# Patient Record
Sex: FEMALE | Race: WHITE | Employment: OTHER | URBAN - METROPOLITAN AREA
[De-identification: names, ages, dates, MRNs, and addresses within clinical notes are randomized per-mention and may not be internally consistent; named-entity substitution may affect disease eponyms.]

---

## 2017-02-07 ENCOUNTER — OFFICE VISIT (OUTPATIENT)
Dept: INTERNAL MEDICINE CLINIC | Age: 82
End: 2017-02-07

## 2017-02-07 VITALS
HEART RATE: 87 BPM | RESPIRATION RATE: 12 BRPM | TEMPERATURE: 97.9 F | DIASTOLIC BLOOD PRESSURE: 76 MMHG | SYSTOLIC BLOOD PRESSURE: 125 MMHG | BODY MASS INDEX: 23.37 KG/M2 | HEIGHT: 61 IN | WEIGHT: 123.8 LBS | OXYGEN SATURATION: 97 %

## 2017-02-07 DIAGNOSIS — Z13.39 SCREENING FOR ALCOHOLISM: ICD-10-CM

## 2017-02-07 DIAGNOSIS — K29.70 GASTRITIS, PRESENCE OF BLEEDING UNSPECIFIED, UNSPECIFIED CHRONICITY, UNSPECIFIED GASTRITIS TYPE: Primary | ICD-10-CM

## 2017-02-07 DIAGNOSIS — M85.80 OSTEOPENIA: ICD-10-CM

## 2017-02-07 DIAGNOSIS — Z12.31 ENCOUNTER FOR SCREENING MAMMOGRAM FOR MALIGNANT NEOPLASM OF BREAST: ICD-10-CM

## 2017-02-07 DIAGNOSIS — F32.89 OTHER DEPRESSION: ICD-10-CM

## 2017-02-07 DIAGNOSIS — E78.00 HYPERCHOLESTEROLEMIA: ICD-10-CM

## 2017-02-07 DIAGNOSIS — Z00.00 ROUTINE GENERAL MEDICAL EXAMINATION AT A HEALTH CARE FACILITY: ICD-10-CM

## 2017-02-07 PROBLEM — E61.1 IRON DEFICIENCY: Status: ACTIVE | Noted: 2017-02-07

## 2017-02-07 RX ORDER — MOXIFLOXACIN HCL 0.5 %
1 DROPS OPHTHALMIC (EYE) DAILY
COMMUNITY
Start: 2017-02-06 | End: 2018-01-11 | Stop reason: ALTCHOICE

## 2017-02-07 RX ORDER — FUROSEMIDE 40 MG/1
40 TABLET ORAL
COMMUNITY
End: 2017-02-07 | Stop reason: SDUPTHER

## 2017-02-07 RX ORDER — PREDNISOLONE ACETATE 10 MG/ML
1 SUSPENSION/ DROPS OPHTHALMIC 2 TIMES DAILY
COMMUNITY
Start: 2017-02-06

## 2017-02-07 RX ORDER — SIMVASTATIN 10 MG/1
10 TABLET, FILM COATED ORAL
Qty: 90 TAB | Refills: 3 | Status: SHIPPED | OUTPATIENT
Start: 2017-02-07 | End: 2018-02-22 | Stop reason: SDUPTHER

## 2017-02-07 RX ORDER — TOBRAMYCIN 0.3 %
OINTMENT (GRAM) OPHTHALMIC (EYE)
Refills: 6 | COMMUNITY
Start: 2017-01-05 | End: 2017-06-13 | Stop reason: ALTCHOICE

## 2017-02-07 RX ORDER — ESOMEPRAZOLE MAGNESIUM 40 MG/1
40 CAPSULE, DELAYED RELEASE ORAL DAILY
Qty: 90 CAP | Refills: 3 | Status: SHIPPED | OUTPATIENT
Start: 2017-02-07 | End: 2017-12-18

## 2017-02-07 RX ORDER — SERTRALINE HYDROCHLORIDE 100 MG/1
100 TABLET, FILM COATED ORAL DAILY
Qty: 90 TAB | Refills: 3 | Status: SHIPPED | OUTPATIENT
Start: 2017-02-07 | End: 2017-08-17 | Stop reason: SDUPTHER

## 2017-02-07 RX ORDER — CARBOXYMETHYLCELLULOSE SODIUM 5 MG/ML
1 SOLUTION/ DROPS OPHTHALMIC 2 TIMES DAILY
COMMUNITY
Start: 2016-11-05

## 2017-02-07 NOTE — PATIENT INSTRUCTIONS
Health Maintenance Due   Topic Date Due    MEDICARE YEARLY EXAM  06/09/1998         Medicare Part B Preventive Services Limitations Recommendation Scheduled   Bone Mass Measurement  (age 72 & older, biennial) Requires diagnosis related to osteoporosis or estrogen deficiency. Biennial benefit unless patient has history of long-term glucocorticoid tx or baseline is needed because initial test was by other method This should be done at age 72 and again if on osteoporosis medication at 2-3 year intervals. Up to date   Cardiovascular Screening Blood Tests (every 5 years)  Total cholesterol, HDL, Triglycerides Order as a panel if possible We should check your cholesterol panel at least once every 5 years. Up to date   Colorectal Cancer Screening  -Fecal occult blood test (annual)  -Flexible sigmoidoscopy (5y)  -Screening colonoscopy (10y)  -Barium Enema  Due per your Gastroenterologist's recommendations. Up to date   Counseling to Prevent Tobacco Use (up to 8 sessions per year)  - Counseling greater than 3 and up to 10 minutes  - Counseling greater than 10 minutes Patients must be asymptomatic of tobacco-related conditions to receive as preventive service Continue with smoking cessation Not applicable   Diabetes Screening Tests (at least every 3 years, Medicare covers annually or at 6-month intervals for prediabetic patients)    Fasting blood sugar (FBS) or glucose tolerance test (GTT) Patient must be diagnosed with one of the following:  -Hypertension, Dyslipidemia, obesity, previous impaired FBS or GTT  Or any two of the following: overweight, FH of diabetes, age ? 72, history of gestational diabetes, birth of baby weighing more than 9 pounds Should be done yearly Up to date   Diabetes Self-Management Training (DSMT) (no USPSTF recommendation) Requires referral by treating physician for patient with diabetes or renal disease.  10 hours of initial DSMT session of no less than 30 minutes each in a continuous 12-month period. 2 hours of follow-up DSMT in subsequent years. Not applicable Not applicable   Glaucoma Screening (no USPSTF recommendation) Diabetes mellitus, family history, , age 48 or over,  American, age 72 or over Continue with annual eye exams. Up to date   Human Immunodeficiency Virus (HIV) Screening (annually for increased risk patients)  HIV-1 and HIV-2 by EIA, BACILIO, rapid antibody test, or oral mucosa transudate Patient must be at increased risk for HIV infection per USPSTF guidelines or pregnant. Tests covered annually for patients at increased risk. Pregnant patients may receive up to 3 test during pregnancy. Not applicable Not applicable   Medical Nutrition Therapy (MNT) (for diabetes or renal disease not recommended schedule) Requires referral by treating physician for patient with diabetes or renal disease. Can be provided in same year as diabetes self-management training (DSMT), and CMS recommends medical nutrition therapy take place after DSMT. Up to 3 hours for initial year and 2 hours in subsequent years. Not applicable Not applicable   Shingles Vaccination A shingles vaccine is also recommended once in a lifetime after age 61 Vaccination recommended for shingles vaccination. Overdue   Seasonal Influenza Vaccination (annually)  Continue with yearly \"flu\" shot annually Up to date   Pneumococcal Vaccination (once after 65)  Please receive this vaccination at age 72. Up to date   Hepatitis B Vaccinations (if medium/high risk) Medium/high risk factors:  End-stage renal disease,  Hemophiliacs who received Factor VIII or IX concentrates, Clients of institutions for the mentally retarded, Persons who live in the same house as a HepB virus carrier, Homosexual men, Illicit injectable drug abusers. Not applicable Not applicable   Screening Mammography (biennial age 54-69) Annually (age 36 or over) You need a mammogram yearly to screen for breast cancer.  Let me know if you want to get this done   Screening Pap Tests and Pelvic Examination (up to age 79 and after 79 if unknown history or abnormal study last 10 years) Every 24 months except high risk You need no Pap smear at this time. Ultrasound Screening for Abdominal Aortic Aneurysm (AAA) (once) Patient must be referred through IPPE and not have had a screening for abdominal aortic aneurysm before under Medicare. Limited to patients who meet one of the following criteria:  - Men who are 73-68 years old and have smoked more than 100 cigarettes in their lifetime.  -Anyone with a FH of AAA  -Anyone recommended for screening by USPSTF Not applicable             Advance Care Planning: Care Instructions  Your Care Instructions  It can be hard to live with an illness that cannot be cured. But if your health is getting worse, you may want to make decisions about end-of-life care. Planning for the end of your life does not mean that you are giving up. It is a way to make sure that your wishes are met. Clearly stating your wishes can make it easier for your loved ones. Making plans while you are still able may also ease your mind and make your final days less stressful and more meaningful. Follow-up care is a key part of your treatment and safety. Be sure to make and go to all appointments, and call your doctor if you are having problems. It's also a good idea to know your test results and keep a list of the medicines you take. What can you do to plan for the end of life? · You can bring these issues up with your doctor. You do not need to wait until your doctor starts the conversation. You might start with \"I would not be willing to live with . Candance Huger Candance Huger Candance Huger \" When you complete this sentence it helps your doctor understand your wishes. · Talk openly and honestly with your doctor. This is the best way to understand the decisions you will need to make as your health changes. Know that you can always change your mind.   · Ask your doctor about commonly used life-support measures. These include tube feedings, breathing machines, and fluids given through a vein (IV). Understanding these treatments will help you decide whether you want them. · You may choose to have these life-supporting treatments for a limited time. This allows a trial period to see whether they will help you. You may also decide that you want your doctor to take only certain measures to keep you alive. It is important to spell out these conditions so that your doctor and family understand them. · Talk to your doctor about how long you are likely to live. He or she may be able to give you an idea of what usually happens with your specific illness. · Think about preparing papers that state your wishes. This way there will not be any confusion about what you want. You can change your instructions at any time. Which papers should you prepare? Advance directives are legal papers that tell doctors how you want to be cared for at the end of your life. You do not need a  to write these papers. Ask your doctor or your state [facility] department for information on how to write your advance directives. They may have the forms for each of these types of papers. Make sure your doctor has a copy of these on file, and give a copy to a family member or close friend. · Consider a do-not-resuscitate order (DNR). This order asks that no extra treatments be done if your heart stops or you stop breathing. Extra treatments may include electrical shock to restart your heart or a machine to breathe for you. If you decide to have a DNR order, ask your doctor to explain and write it. Place the order in your home where everyone can easily see it. · Consider a living will. A living will explains your wishes in case you are in a coma or cannot communicate. Living mars tell doctors to use or not use treatments that would keep you alive.  You must have one or two witnesses or a notary present when you sign this form.  · Consider a durable power of . This allows you to name a person to make decisions about your care if you are not able to. Most people ask a close friend or family member. Talk to this person about the kinds of treatments you want and those that you do not want. Make sure this person understands your wishes. If this person is not the health care agent named in your advance directive, also talk with your health care agent. These legal papers are simple to change. Tell your doctor what you want to change, and ask him or her to make a note in your medical file. Give your family updated copies of the papers. Where can you learn more? Go to http://elmo-milly.info/. Enter P184 in the search box to learn more about \"Advance Care Planning: Care Instructions. \"  Current as of: February 24, 2016  Content Version: 11.1  © 1507-9437 [employer]. Care instructions adapted under license by [employer] (which disclaims liability or warranty for this information). If you have questions about a medical condition or this instruction, always ask your healthcare professional. [address] any warranty or liability for your use of this information.

## 2017-02-07 NOTE — PROGRESS NOTES
INTERNISTS OF [facility]:  2/12/2017, MRN: [id number]      Rigo Watt is a 80 y.o. female and presents to clinic for Annual Wellness Visit (Medicare)    Subjective:   Pt is an 79yo female with h/o iron deficiency, DJD, mitral valve disease, goiter, depression, AF, hyperthyroidism, HTN, rheumatoid arthritis, gastritis, and osteopenia. 1. HLD:   - Chronic condition   - On zocor, no adverse side effects   - Pt needs a refill on this rx    2. Depression:   - Chronic, on sertraline   - No adverse effects from taking sertraline   - Pt needs a refill on this rx   - She states that it is working well to control her sx of depression   - No suicidal ideation    3. Gastritis and GERD Hx:   - GERD is a chronic condition, present >6 months    - Pt has h/o gastritis   - Pt takes nexium and needs a refill on this   - No adverse side effects from nexium      Patient Active Problem List    Diagnosis Date Noted    Iron deficiency 02/07/2017    Osteoarthritis of right knee 01/29/2015    Mitral valve disorder 01/12/2015    Goiter, toxic, multinodular 03/30/2014    Depression 05/10/2012    Atrial fibrillation ([facility] [address] 02/08/2012    Long term (current) use of anticoagulants 12/19/2011    Hyperthyroidism 12/14/2011    RA (rheumatoid arthritis) ([facility] [address] 07/21/2011    HTN (hypertension) 08/27/2010    Hypercholesterolemia 08/27/2010    Gastritis 08/27/2010    Osteopenia 08/27/2010       Current Outpatient Prescriptions   Medication Sig Dispense Refill    REFRESH TEARS 0.5 % drop ophthalmic solution Apply 1 Drop to eye two (2) times a day.  prednisoLONE acetate (PRED FORTE) 1 % ophthalmic suspension Administer 1 Drop to right eye two (2) times a day.  VIGAMOX 0.5 % ophthalmic solution Administer 1 Drop to right eye daily.  TOBREX 0.3 % ophthalmic ointment PLACE 1/8TH INCH STRIP IN OD QHS UTD  6    NEXIUM 40 mg capsule Take 1 Cap by mouth daily.  90 Cap 3    simvastatin (ZOCOR) 10 mg tablet Take 1 Tab by mouth nightly. 90 Tab 3    sertraline (ZOLOFT) 100 mg tablet Take 1 Tab by mouth daily. 90 Tab 3    dilTIAZem CD (CARDIZEM CD) 180 mg ER capsule TAKE 1 CAPSULE DAILY 90 Cap 2    losartan (COZAAR) 25 mg tablet TAKE 1 TABLET DAILY 90 Tab 3    dabigatran etexilate (PRADAXA) 150 mg capsule Take 1 Cap by mouth two (2) times a day. 180 Cap 3    ferrous sulfate 325 mg (65 mg iron) tablet 325 mg two (2) times a day.  hydroxychloroquine (PLAQUENIL) 200 mg tablet Take 200 mg by mouth two (2) times a day.  CELECOXIB (CELEBREX PO) Take 200 mg by mouth daily as needed.  cycloSPORINE (RESTASIS) 0.05 % ophthalmic emulsion Administer 1 Drop to both eyes two (2) times a day.  peg 400-propylene glycol (SYSTANE) 0.4-0.3 % Drop Administer 1 Drop to right eye as needed.  methimazole (TAPAZOLE) 5 mg tablet Take 2.5 mg by mouth nightly.  cholecalciferol, vitamin d3, (VITAMIN D) 1,000 unit tablet Take 1,000 Units by mouth daily.  CALCIUM CITRATE (CALCITRATE PO) Take 1 Tab by mouth two (2) times a day. Allergies   Allergen Reactions    Etanercept Other (comments)     Caused bad infection behind knee 5/2015  Caused bad infection behind knee 5/2015    Morphine Nausea and Vomiting     N&V  vomitting    Haloperidol Lactate Other (comments)     Hallucinations, agitation, increased BP       Past Medical History   Diagnosis Date    Acute gastric ulcer without mention of hemorrhage, perforation, or obstruction 1989     surg repair    Anemia     Arrhythmia     Atrial fibrillation ([facility] [address] 12/14/2011    Cataract 8-2001     RIGHT    Depression 4-    Edema leg RIGHT      NORMAL VENOUS DOPPLER    Goiter Nodular 7-30-01    History of echocardiogram 04/20/2015     EF 50-55%. No RWMA. Mild LVH. Indeterminate diastolic fx. RVSP 40 mmHg. Severe LAE. Mild JENNY. Mild MVP of anterior leaflet. Mod MR w/2 jets. Mod TR. No significant chg from study of 6/26/14.     History of Holter monitoring 01/09/2012     Well controlled atrial fibrillation. Avg HR 83 bpm.      Hypercholesterolemia     Hypertension     Hyperthyroidism 12/14/2011    Insomnia 6-18-09    Leg swelling     Lower extremity venous duplex 12/18/2013     Right leg:  No venous thrombosis.  Multinodular goiter     Nonspecific reaction to tuberculin skin test without active tuberculosis     Osteopenia 8/27/2010    Osteoporosis 2-2009     s/p reclast rx    Otitis media     Retina, separation 2004     detached retina    Rheumatoid arthritis(714.0)      rheumatoid    Septic arthritis [facility]) May 2015     R knee following with Ortho and ID    Skin cancer (melanoma) ([facility] [address] 03/2013    Symptomatic menopausal or female climacteric states     Thyroid disease        Past Surgical History   Procedure Laterality Date    Hx thierry and bso  1967    Hx blepharoplasty      Hx skin biopsy  04/05/2013     malignant melanoma    Hx mohs procedure  9-2002     LEFT    Hx orthopaedic       right leg sx    Hx heent       hx detached retina    Pr colonoscopy flx dx w/collj spec when pfrmd  8-2001     polyp(tubular adenoma); Dr Alicia Wright Pr colonoscopy w/biopsy single/multiple  5-     (+)polyp (bx: tubular adenoma) Dr. Alicia Wright Hx hip replacement      Hx knee arthroscopy       scheduled for replacement but could not do due to infection       Family History   Problem Relation Age of Onset    Hypertension Mother     Heart Disease Mother     Osteoporosis Mother     Alcohol abuse Father        Social History   Substance Use Topics    Smoking status: Former Smoker     Years: 45.00     Types: Cigarettes     Quit date: 8/27/2003    Smokeless tobacco: Never Used    Alcohol use 0.0 oz/week     0 Standard drinks or equivalent per week      Comment: occasionally       ROS   Review of Systems   Constitutional: Negative for chills and fever. HENT: Negative for ear pain and sore throat. Eyes: Negative for blurred vision and pain. Respiratory: Negative for cough and shortness of breath. Cardiovascular: Negative for chest pain. Gastrointestinal: Negative for abdominal pain. Genitourinary: Negative for dysuria. Musculoskeletal: Negative for joint pain and myalgias. Skin: Negative for rash. Neurological: Negative for tingling, focal weakness and headaches. Endo/Heme/Allergies: Positive for environmental allergies (pt has h/o drug allergies). Psychiatric/Behavioral: Negative for substance abuse. Objective     Vitals:    02/07/17 1217   BP: 125/76   Pulse: 87   Resp: 12   Temp: 97.9 °F (36.6 °C)   TempSrc: Oral   SpO2: 97%   Weight: 123 lb 12.8 oz (56.2 kg)   Height: 5' 1\" (1.549 m)   PainSc:   0 - No pain       Physical Exam   Constitutional: She is oriented to person, place, and time and well-developed, well-nourished, and in no distress. HENT:   Head: Normocephalic and atraumatic. Right Ear: External ear normal.   Left Ear: External ear normal.   Nose: Nose normal.   Mouth/Throat: Oropharynx is clear and moist. No oropharyngeal exudate. Eyes: Conjunctivae and EOM are normal. Right eye exhibits no discharge. Left eye exhibits no discharge. No scleral icterus. Neck: Neck supple. Cardiovascular: Normal rate, regular rhythm, normal heart sounds and intact distal pulses. Exam reveals no gallop and no friction rub. No murmur heard. Pulmonary/Chest: Effort normal and breath sounds normal. No respiratory distress. She has no wheezes. She has no rales. Abdominal: Soft. Bowel sounds are normal. She exhibits no distension. There is no tenderness. There is no rebound and no guarding. Musculoskeletal: She exhibits no edema or tenderness (BUE are NTTP). Lymphadenopathy:     She has no cervical adenopathy. Neurological: She is alert and oriented to person, place, and time. She exhibits normal muscle tone. Gait normal.   Skin: Skin is warm and dry. No erythema.    Psychiatric: Affect normal.   Nursing note and vitals reviewed. LABS   Data Review:   Lab Results   Component Value Date/Time    WBC 8.0 09/08/2016 10:00 AM    Hemoglobin (POC) 10.9 05/20/2013 11:02 AM    HGB 10.8 09/08/2016 10:00 AM    Hematocrit (POC) 32 05/20/2013 11:02 AM    HCT 34.7 09/08/2016 10:00 AM    PLATELET 049 09/10/4782 10:00 AM    MCV 84.2 09/08/2016 10:00 AM       Lab Results   Component Value Date/Time    Sodium 139 09/08/2016 10:00 AM    Potassium 4.5 09/08/2016 10:00 AM    Chloride 104 09/08/2016 10:00 AM    CO2 26 09/08/2016 10:00 AM    Anion gap 9 09/08/2016 10:00 AM    Glucose 80 09/08/2016 10:00 AM    BUN 21 09/08/2016 10:00 AM    Creatinine 0.62 09/08/2016 10:00 AM    BUN/Creatinine ratio 34 09/08/2016 10:00 AM    GFR est AA >60 09/08/2016 10:00 AM    GFR est non-AA >60 09/08/2016 10:00 AM    Calcium 8.6 09/08/2016 10:00 AM       Lab Results   Component Value Date/Time    Cholesterol, total 146 09/08/2016 10:00 AM    HDL Cholesterol 97 09/08/2016 10:00 AM    LDL, calculated 37.2 09/08/2016 10:00 AM    VLDL, calculated 11.8 09/08/2016 10:00 AM    Triglyceride 59 09/08/2016 10:00 AM    CHOL/HDL Ratio 1.5 09/08/2016 10:00 AM       Assessment/Plan:   1. Hypercholesterolemia  - Simvastatin refilled. ORDERS:  - simvastatin (ZOCOR) 10 mg tablet; Take 1 Tab by mouth nightly. Dispense: 90 Tab; Refill: 3    2. Other depression: Stable. - Sertraline refilled. ORDERS:  - sertraline (ZOLOFT) 100 mg tablet; Take 1 Tab by mouth daily. Dispense: 90 Tab; Refill: 3    3. Gastritis hx and GERD:  - Nexium refilled. Lab review: labs are reviewed in the EHR    I have discussed the diagnosis with the patient and the intended plan as seen in the above orders. The patient has received an after-visit summary and questions were answered concerning future plans. I have discussed medication side effects and warnings with the patient as well.  I have reviewed the plan of care with the patient, accepted their input and they are in agreement with the treatment goals. All questions were answered. The patient understands the plan of care. Handouts provided today with above information. Pt instructed if symptoms worsen to call the office or report to the ED for continued care. Greater than 50% of the visit time was spent in counseling and/or coordination of care. Follow-up Disposition:  Return if symptoms worsen or fail to improve. MEDICARE ANNUAL WELLNESS VISIT/EXAM   INTERNISTS OF [facility]:  02/12/17, [id number]      The Subsequent AWV PROGRESS NOTE    I have reviewed the patient's medical history in detail and updated the computerized patient record. Weston Patel is a 80 y.o.  female and presents for an annual wellness exam.    Subjective:   Health Maintenance History:  Immunizations reviewed: Tdap up to date   Pneumovax: up to date   Flu: up to date  Zoster: over-due    Immunization History   Administered Date(s) Administered    Influenza Vaccine 10/09/2014    Influenza Vaccine (Quad) PF 10/17/2016    Influenza Vaccine PF 10/09/2013    Influenza Vaccine Split 11/01/2010    Influenza Vaccine Whole 10/11/2012    Pneumococcal Vaccine (Unspecified Type) 11/21/1994, 08/09/2012    Tdap 05/27/2016         Colonoscopy: up to date per pt hx; we do not have records of the findings from pt's last colonoscopy     Eye exam: up to date per pt hx; we do not have records of the findings of pt's last formal eye exam    Mammo: over due; pt is not interested in having a mammogram to screen for breast cancer. Dexascan: up to date per pt hx but we do not have records of this study    Pelvic/Pap: not warranted since pt has no vaginal pain/bleeding.       Past Medical History   Diagnosis Date    Acute gastric ulcer without mention of hemorrhage, perforation, or obstruction 1989     surg repair    Anemia     Arrhythmia     Atrial fibrillation ([facility] [address] 12/14/2011    Cataract 8-2001     RIGHT    Depression 4-    Edema leg RIGHT NORMAL VENOUS DOPPLER    Goiter Nodular 7-30-01    History of echocardiogram 04/20/2015     EF 50-55%. No RWMA. Mild LVH. Indeterminate diastolic fx. RVSP 40 mmHg. Severe LAE. Mild JENNY. Mild MVP of anterior leaflet. Mod MR w/2 jets. Mod TR. No significant chg from study of 6/26/14.  History of Holter monitoring 01/09/2012     Well controlled atrial fibrillation. Avg HR 83 bpm.      Hypercholesterolemia     Hypertension     Hyperthyroidism 12/14/2011    Insomnia 6-18-09    Leg swelling     Lower extremity venous duplex 12/18/2013     Right leg:  No venous thrombosis.  Multinodular goiter     Nonspecific reaction to tuberculin skin test without active tuberculosis     Osteopenia 8/27/2010    Osteoporosis 2-2009     s/p reclast rx    Otitis media     Retina, separation 2004     detached retina    Rheumatoid arthritis(714.0)      rheumatoid    Septic arthritis [facility]) May 2015     R knee following with Ortho and ID    Skin cancer (melanoma) ([facility] [address] 03/2013    Symptomatic menopausal or female climacteric states     Thyroid disease         Past Surgical History   Procedure Laterality Date    Hx thierry and bso  1967    Hx blepharoplasty      Hx skin biopsy  04/05/2013     malignant melanoma    Hx mohs procedure  9-2002     LEFT    Hx orthopaedic       right leg sx    Hx heent       hx detached retina    Pr colonoscopy flx dx w/collj spec when pfrmd  8-2001     polyp(tubular adenoma); Dr Shirley Wallace Pr colonoscopy w/biopsy single/multiple  5-     (+)polyp (bx: tubular adenoma) Dr. Shirley Wallace Hx hip replacement      Hx knee arthroscopy       scheduled for replacement but could not do due to infection       Current Outpatient Prescriptions   Medication Sig Dispense Refill    REFRESH TEARS 0.5 % drop ophthalmic solution Apply 1 Drop to eye two (2) times a day.  prednisoLONE acetate (PRED FORTE) 1 % ophthalmic suspension Administer 1 Drop to right eye two (2) times a day.       VIGAMOX 0.5 % ophthalmic solution Administer 1 Drop to right eye daily.  TOBREX 0.3 % ophthalmic ointment PLACE 1/8TH INCH STRIP IN OD QHS UTD  6    NEXIUM 40 mg capsule Take 1 Cap by mouth daily. 90 Cap 3    simvastatin (ZOCOR) 10 mg tablet Take 1 Tab by mouth nightly. 90 Tab 3    sertraline (ZOLOFT) 100 mg tablet Take 1 Tab by mouth daily. 90 Tab 3    dilTIAZem CD (CARDIZEM CD) 180 mg ER capsule TAKE 1 CAPSULE DAILY 90 Cap 2    losartan (COZAAR) 25 mg tablet TAKE 1 TABLET DAILY 90 Tab 3    dabigatran etexilate (PRADAXA) 150 mg capsule Take 1 Cap by mouth two (2) times a day. 180 Cap 3    ferrous sulfate 325 mg (65 mg iron) tablet 325 mg two (2) times a day.  hydroxychloroquine (PLAQUENIL) 200 mg tablet Take 200 mg by mouth two (2) times a day.  CELECOXIB (CELEBREX PO) Take 200 mg by mouth daily as needed.  cycloSPORINE (RESTASIS) 0.05 % ophthalmic emulsion Administer 1 Drop to both eyes two (2) times a day.  peg 400-propylene glycol (SYSTANE) 0.4-0.3 % Drop Administer 1 Drop to right eye as needed.  methimazole (TAPAZOLE) 5 mg tablet Take 2.5 mg by mouth nightly.  cholecalciferol, vitamin d3, (VITAMIN D) 1,000 unit tablet Take 1,000 Units by mouth daily.  CALCIUM CITRATE (CALCITRATE PO) Take 1 Tab by mouth two (2) times a day.          Allergies   Allergen Reactions    Etanercept Other (comments)     Caused bad infection behind knee 5/2015  Caused bad infection behind knee 5/2015    Morphine Nausea and Vomiting     N&V  vomitting    Haloperidol Lactate Other (comments)     Hallucinations, agitation, increased BP       Family History   Problem Relation Age of Onset    Hypertension Mother     Heart Disease Mother     Osteoporosis Mother     Alcohol abuse Father        Social History   Substance Use Topics    Smoking status: Former Smoker     Years: 45.00     Types: Cigarettes     Quit date: 8/27/2003    Smokeless tobacco: Never Used    Alcohol use 0.0 oz/week 0 Standard drinks or equivalent per week      Comment: occasionally       Patient Active Problem List   Diagnosis Code    HTN (hypertension) I10    Hypercholesterolemia E78.00    Gastritis K29.70    Osteopenia M85.80    RA (rheumatoid arthritis) ([facility] [address] M06.9    Hyperthyroidism E05.90    Long term (current) use of anticoagulants Z79.01    [facility] I48.91    Depression F32.9    Goiter, toxic, multinodular E05.20    Mitral valve disorder I05.9    Osteoarthritis of right knee M17.9    Iron deficiency E61.1       Review of Systems   Constitutional: Negative for chills and fever. HENT: Negative for ear pain and sore throat. Eyes: Negative for blurred vision and pain. Respiratory: Negative for cough and shortness of breath. Cardiovascular: Negative for chest pain. Gastrointestinal: Negative for abdominal pain. Genitourinary: Negative for dysuria. Musculoskeletal: Negative for joint pain and myalgias. Skin: Negative for rash. Neurological: Negative for tingling, focal weakness and headaches. Endo/Heme/Allergies: Positive for environmental allergies (pt has h/o drug allergies). Psychiatric/Behavioral: Negative for substance abuse. Depression Risk Factor Screening:    Patient Health Questionnaire (PHQ-2)   Over the last 2 weeks, how often have you been bothered by any of the following problems? · Little interest or pleasure in doing things? · Not at all. [0]  · Feeling down, depressed, or hopeless? · Not at all. [0]    Total Score: 0/6  PHQ-2 Assessment Scoring:   A score of 2 or more requires further screening with the PHQ-9    On sertraline for depression, stable. Alcohol Risk Factor Screening:   Women:   1. On any occasion during the past 3 months, have you had more than 3 drinks containing alcohol? no  2.  Do you average more than 7 drinks per week?no    Tobacco Use Screening:     History   Smoking Status    Former Smoker    Years: 45.00    Types: Cigarettes    Quit date: 8/27/2003   Smokeless Tobacco    Never Used       Hearing Loss   No changes to her hearing loss (chronic)    Activities of Daily Living   Partial assistance. Pt requires assistance with no IADLs/ADLs. Requires assistance with: no ADLs    Fall Risk   No falls over the past year per pt hx    Abuse Screen   None; pt feels safe in her home. Additional Examination Findings:  Vitals:    02/07/17 1217   BP: 125/76   Pulse: 87   Resp: 12   Temp: 97.9 °F (36.6 °C)   TempSrc: Oral   SpO2: 97%   Weight: 123 lb 12.8 oz (56.2 kg)   Height: 5' 1\" (1.549 m)   PainSc:   0 - No pain      Body mass index is 23.39 kg/(m^2). Evaluation of Cognitive Function:  Mood/affect: Euthymic  Appearance: Well-groomed  Family member/caregiver input: pt is not accompanied by a family member    Physical Exam   Constitutional: She is oriented to person, place, and time and well-developed, well-nourished, and in no distress. HENT:   Head: Normocephalic and atraumatic. Right Ear: External ear normal.   Left Ear: External ear normal.   Nose: Nose normal.   Mouth/Throat: Oropharynx is clear and moist. No oropharyngeal exudate. Eyes: Conjunctivae and EOM are normal. Right eye exhibits no discharge. Left eye exhibits no discharge. No scleral icterus. Neck: Neck supple. Cardiovascular: Normal rate, regular rhythm, normal heart sounds and intact distal pulses. Exam reveals no gallop and no friction rub. No murmur heard. Pulmonary/Chest: Effort normal and breath sounds normal. No respiratory distress. She has no wheezes. She has no rales. Abdominal: Soft. Bowel sounds are normal. She exhibits no distension. There is no tenderness. There is no rebound and no guarding. Musculoskeletal: She exhibits no edema or tenderness (BUE are NTTP). Lymphadenopathy:     She has no cervical adenopathy. Neurological: She is alert and oriented to person, place, and time. She exhibits normal muscle tone.  Gait normal. Skin: Skin is warm and dry. No erythema. Psychiatric: Affect normal.   Nursing note and vitals reviewed. Dementia Screen (Mini-Cog):   Three Item Recall: 1/3  Clock Drawing (ten past eleven) Exercise: unremarkable clock drawing exercise      LABS   Data Review:   Lab Results   Component Value Date/Time    Sodium 139 09/08/2016 10:00 AM    Potassium 4.5 09/08/2016 10:00 AM    Chloride 104 09/08/2016 10:00 AM    CO2 26 09/08/2016 10:00 AM    Anion gap 9 09/08/2016 10:00 AM    Glucose 80 09/08/2016 10:00 AM    BUN 21 09/08/2016 10:00 AM    Creatinine 0.62 09/08/2016 10:00 AM    BUN/Creatinine ratio 34 09/08/2016 10:00 AM    GFR est AA >60 09/08/2016 10:00 AM    GFR est non-AA >60 09/08/2016 10:00 AM    Calcium 8.6 09/08/2016 10:00 AM       Lab Results   Component Value Date/Time    WBC 8.0 09/08/2016 10:00 AM    Hemoglobin (POC) 10.9 05/20/2013 11:02 AM    HGB 10.8 09/08/2016 10:00 AM    Hematocrit (POC) 32 05/20/2013 11:02 AM    HCT 34.7 09/08/2016 10:00 AM    PLATELET 937 32/93/8193 10:00 AM    MCV 84.2 09/08/2016 10:00 AM       Lab Results   Component Value Date/Time    Cholesterol, total 146 09/08/2016 10:00 AM    HDL Cholesterol 97 09/08/2016 10:00 AM    LDL, calculated 37.2 09/08/2016 10:00 AM    VLDL, calculated 11.8 09/08/2016 10:00 AM    Triglyceride 59 09/08/2016 10:00 AM    CHOL/HDL Ratio 1.5 09/08/2016 10:00 AM       Patient Care Team:  Paulina Gutiérrez MD as PCP - General (Family Practice)  Linda Carrel, MD (Cardiology)  Georganna Rubinstein, RN as Nurse Navigator (Internal Medicine)  Sae Potter DO as Physician (Orthopedic Surgery)  Baljeet Graves MD (Gastroenterology)  Naty Huizar MD as Hospitalist (Infectious Diseases)  Ragini Morgan MD (Dermatology)  Lily Mcbride MD (Ophthalmology)  Jh Martinez MD (Oncology)  Shannon South (Endocrinology)    End-of-life planning  Advanced Directive in the case that an injury or illness causes the patient to be unable to make health care decisions was discussed with the patient. Pt does not have an advanced directive. She wants her family not to be burdened by making decisions about her health if she is terminally ill. We reviewed a blank copy of the advanced directive. Pt wants to read it over at home prior to signing and completing it. Advice/Referrals/Counselling/Plan:   Education and counseling provided:  Are appropriate based on today's review and evaluation  End-of-Life planning (with patient's consent)  Pneumococcal Vaccine  Influenza Vaccine  Screening Mammography  Screening Pap and pelvic (covered once every 2 years)  Colorectal cancer screening tests  Cardiovascular screening blood test  Bone mass measurement (DEXA)  Screening for glaucoma  Diabetes screening test    Include in education list (weight loss, physical activity, smoking cessation, fall prevention, and nutrition)    ICD-10-CM ICD-9-CM    1. Gastritis, presence of bleeding unspecified, unspecified chronicity, unspecified gastritis type K29.70 535.50    2. Hypercholesterolemia E78.00 272.0 simvastatin (ZOCOR) 10 mg tablet   3. Other depression F32.89  sertraline (ZOLOFT) 100 mg tablet   4. Routine general medical examination at a health care facility Z00.00 V70.0    5. Screening for alcoholism Z13.89 V79.1    6. Encounter for screening mammogram for malignant neoplasm of breast Z12.31 V76.12    7. Osteopenia M85.80 733.90      cardiovascular risk and specific lipid/LDL goals reviewed  reviewed medications and side effects in detail. Brief written plan, checklist - health maintenance - given to patient. Assessment/Plan:    Routine general medical examination at a health care facility  - Reviewed the pros/cons of breast cancer screening and recommended that pt continue with screening mammograms.  She declined breast cancer screening but will let me know if she changes her mind   - Requesting records of pt's last DEXA scan, colonoscopy, and formal eye exam    Lab review: labs are reviewed in [facility] ([facility]) Provider Conversation     Date of [facility] Conversation: 02/12/17  Persons included in Conversation:  patient    Authorized Decision Maker (if patient is incapable of making informed decisions): This person is:   Healthcare Agent/Medical Power of  under Advance Directive - who pt wants to be a family member. For Patients with Decision Making Capacity:   Values/Goals: Exploration of values, goals, and preferences if recovery is not expected, even with continued medical treatment in the event of:  Imminent death  Severe, permanent brain injury    Conversation Outcomes / Follow-Up Plan:   Recommended completion of Advance Directive form after review of ACP materials and conversation with prospective healthcare agent . Advanced Directive in the case that an injury or illness causes the patient to be unable to make health care decisions was discussed with the patient. Pt does not have an advanced directive. She wants her family not to be burdened by making decisions about her health if she is terminally ill. We reviewed a blank copy of the advanced directive. Pt wants to read it over at home prior to signing and completing it. I have discussed the diagnosis/diagnoses with the patient and the intended plan as seen in the above orders. The patient has received an after-visit summary and questions were answered concerning future plans. I have discussed medication side effects and warnings with the patient as well. I have reviewed the plan of care with the patient, accepted their input and they are in agreement with the treatment goals. All questions were answered. Follow-up Disposition:  Return if symptoms worsen or fail to improve.

## 2017-02-07 NOTE — PROGRESS NOTES
Chief Complaint   Patient presents with   [address] [facility] Annual Wellness Visit     Medicare     1. Have you been to the ER, urgent care clinic since your last visit? Hospitalized since your last visit? No    2. Have you seen or consulted any other health care providers outside of the [address] [place] [address] since your last visit? Include any pap smears or colon screening.  No

## 2017-02-07 NOTE — PROGRESS NOTES
INTERNISTS OF [facility]:  02/07/17, 10398      The Initial Medicare Annual Wellness Exam PROGRESS NOTE    This is an Initial*** Medicare Annual Wellness Exam (AWV). I have reviewed the patient's medical history in detail and updated the computerized patient record. Burke Roach is a 80 y.o. {race/ethnicity:86216} female and presents for an annual wellness exam.    SUBJECTIVE  The pt has no acute complaints today. ***    Past Medical History   Diagnosis Date    Acute gastric ulcer without mention of hemorrhage, perforation, or obstruction 1989     surg repair    Anemia     Arrhythmia     Atrial fibrillation ([facility] [address] 12/14/2011    Cataract 8-2001     RIGHT    Depression 4-    Edema leg RIGHT      NORMAL VENOUS DOPPLER    Goiter Nodular 7-30-01    History of echocardiogram 04/20/2015     EF 50-55%. No RWMA. Mild LVH. Indeterminate diastolic fx. RVSP 40 mmHg. Severe LAE. Mild JENNY. Mild MVP of anterior leaflet. Mod MR w/2 jets. Mod TR. No significant chg from study of 6/26/14.  History of Holter monitoring 01/09/2012     Well controlled atrial fibrillation. Avg HR 83 bpm.      Hypercholesterolemia     Hypertension     Hyperthyroidism 12/14/2011    Insomnia 6-18-09    Leg swelling     Lower extremity venous duplex 12/18/2013     Right leg:  No venous thrombosis.     Multinodular goiter     Nonspecific reaction to tuberculin skin test without active tuberculosis     Osteopenia 8/27/2010    Osteoporosis 2-2009     s/p reclast rx    Otitis media     Retina, separation 2004     detached retina    Rheumatoid arthritis(714.0)      rheumatoid    Septic arthritis [facility]) May 2015     R knee following with Ortho and ID    Skin cancer (melanoma) ([facility] [address] 03/2013    Symptomatic menopausal or female climacteric states     Thyroid disease       Past Surgical History   Procedure Laterality Date    Hx thierry and bso  1967    Hx blepharoplasty      Hx skin biopsy  04/05/2013 malignant melanoma    Hx mohs procedure  9-2002     LEFT    Hx orthopaedic       right leg sx    Hx heent       hx detached retina    Pr colonoscopy flx dx w/collj spec when pfrmd  8-2001     polyp(tubular adenoma); Dr Darren Newton Pr colonoscopy w/biopsy single/multiple  5-     (+)polyp (bx: tubular adenoma) Dr. Darren Newton Hx hip replacement      Hx knee arthroscopy       scheduled for replacement but could not do due to infection     Current Outpatient Prescriptions   Medication Sig Dispense Refill    REFRESH TEARS 0.5 % drop ophthalmic solution Apply 1 Drop to eye two (2) times a day.  prednisoLONE acetate (PRED FORTE) 1 % ophthalmic suspension Administer 1 Drop to right eye two (2) times a day.  VIGAMOX 0.5 % ophthalmic solution Administer 1 Drop to right eye daily.  TOBREX 0.3 % ophthalmic ointment PLACE 1/8TH INCH STRIP IN OD QHS UTD  6    dilTIAZem CD (CARDIZEM CD) 180 mg ER capsule TAKE 1 CAPSULE DAILY 90 Cap 2    losartan (COZAAR) 25 mg tablet TAKE 1 TABLET DAILY 90 Tab 3    NEXIUM 40 mg capsule Take 40 mg by mouth daily.  dabigatran etexilate (PRADAXA) 150 mg capsule Take 1 Cap by mouth two (2) times a day. 180 Cap 3    simvastatin (ZOCOR) 10 mg tablet Take 1 Tab by mouth nightly. 90 Tab 1    sertraline (ZOLOFT) 100 mg tablet Take 1 Tab by mouth daily. 90 Tab 3    ferrous sulfate 325 mg (65 mg iron) tablet 325 mg two (2) times a day.  hydroxychloroquine (PLAQUENIL) 200 mg tablet Take 200 mg by mouth two (2) times a day.  CELECOXIB (CELEBREX PO) Take 200 mg by mouth daily as needed.  cycloSPORINE (RESTASIS) 0.05 % ophthalmic emulsion Administer 1 Drop to both eyes two (2) times a day.  peg 400-propylene glycol (SYSTANE) 0.4-0.3 % Drop Administer 1 Drop to right eye as needed.  methimazole (TAPAZOLE) 5 mg tablet Take 2.5 mg by mouth nightly.  cholecalciferol, vitamin d3, (VITAMIN D) 1,000 unit tablet Take 1,000 Units by mouth daily.       CALCIUM CITRATE (CALCITRATE PO) Take 1 Tab by mouth two (2) times a day. Allergies   Allergen Reactions    Etanercept Other (comments)     Caused bad infection behind knee 5/2015  Caused bad infection behind knee 5/2015    Morphine Nausea and Vomiting     N&V  vomitting    Haloperidol Lactate Other (comments)     Hallucinations, agitation, increased BP     Family History   Problem Relation Age of Onset    Hypertension Mother     Heart Disease Mother     Osteoporosis Mother     Alcohol abuse Father      Social History   Substance Use Topics    Smoking status: Former Smoker     Years: 45.00     Types: Cigarettes     Quit date: 8/27/2003    Smokeless tobacco: Never Used    Alcohol use 0.0 oz/week     0 Standard drinks or equivalent per week      Comment: occasionally     Patient Active Problem List   Diagnosis Code    HTN (hypertension) I10    Hypercholesterolemia E78.00    Gastritis K29.70    Osteopenia M85.80    RA (rheumatoid arthritis) ([facility] [address] M06.9    Hyperthyroidism E05.90    Long term (current) use of anticoagulants Z79.01    Atrial fibrillation ([facility] [address] I48.91    Depression F32.9    Goiter, toxic, multinodular E05.20    Mitral valve disorder I05.9    Osteoarthritis of right knee M17.9    Iron deficiency E61.1       Health Maintenance History  Immunizations reviewed:    Tdap {Blank Multiple Select Template:20062::\"up to date\",\"over-due\",\"pt declines this vaccination today\"}   Pneumovax: {Blank Multiple Select Template:20062::\"up to date\",\"over-due\",\"pt declines this vaccination today\"}   Flu: {Blank Multiple Select Template:20062::\"up to date\",\"over-due\",\"pt declines this vaccination today\"}  Zoster: {Blank Multiple Select Template:20062::\"up to date\",\"over-due\",\"pt declines this vaccination today\"}    Immunization History   Administered Date(s) Administered    Influenza Vaccine 10/09/2014    Influenza Vaccine (Quad) PF 10/17/2016    Influenza Vaccine PF 10/09/2013    Influenza Vaccine Split 11/01/2010    Influenza Vaccine Whole 10/11/2012    Pneumococcal Vaccine (Unspecified Type) 11/21/1994, 08/09/2012    Tdap 05/27/2016       Colonoscopy: ***     Eye exam: ***    Mammo: ***    Dexascan: ***    PSA: ***    Pelvic/Pap: ***      ROS    Depression Risk Factor Screening:      Patient Health Questionnaire (PHQ-2)   Over the last 2 weeks, how often have you been bothered by any of the following problems? · Little interest or pleasure in doing things? · {PHQ-9 response:19750}  · Feeling down, depressed, or hopeless? · {PHQ-9 response:19750}    Total Score: ***/6  PHQ-2 Assessment Scoring:   A score of 2 or more requires further screening with the PHQ-9    Patient Health Questionnaire (PHQ-9)   Over the last 2 weeks, how often have you been bothered by any of the following problems? · Little interest or pleasure in doing things? · {PHQ-9 response:19750}  · Feeling down, depressed, or hopeless? · {PHQ-9 response:19750}  · Trouble falling or staying asleep, or sleeping too much? · {PHQ-9 response:19750}  · Feeling tired or having little energy? · {PHQ-9 response:19750}  · Poor appetite or overeating? · {PHQ-9 response:19750}  · Feeling bad about yourself - or that you are a failure or have let yourself or your family down? · {PHQ-9 response:19750}  · Trouble concentrating on things, such as reading the newspaper or watching television? · {PHQ-9 response:19750}  · Moving or speaking so slowly that other people could have noticed? Or the opposite - being so fidgety or restless that you have been moving around a lot more than usual?  · {PHQ-9 response:19750}  · Thoughts that you would be better off dead, or of hurting yourself in some way? · {PHQ-9 response:19750}    Total Score: ***/27  Depression Severity:   0-4 None, 5-9 mild, 10-14 moderate, 15-19 moderately severe, 20-27 severe. Alcohol Risk Factor Screening:   Women:    On any occasion during the past 3 months, have you had more than 3 drinks containing alcohol? {YES/NO DIET:317153157}    Do you average more than 7 drinks per week? {YES/NO DIET:775581188}    Men:   On any occasion during the past 3 months, have you had more than 4 drinks containing alcohol? {YES/NO DIET:389060594}    Do you average more than 14 drinks per week? {YES/NO DIET:929685121}    Tobacco Use Screening:     History   Smoking Status    Former Smoker    Years: 45.00    Types: Cigarettes    Quit date: 8/27/2003   Smokeless Tobacco    Never Used       Hearing Loss    {Desc; hearing loss:59696}    Activities of Daily Living   {ADL:95019::\"Self-care\"}. Requires assistance with: {ADLs:97333::\"no ADLs\"}    Fall Risk   {Rall Risk Factors:19751::\"No fall risk factors\"}    Abuse Screen   {Abuse Screen:19752::\"None\"}    Additional Examination Findings:  Vitals:    02/07/17 1217   BP: 125/76   Pulse: 87   Resp: 12   Temp: 97.9 °F (36.6 °C)   TempSrc: Oral   SpO2: 97%   Weight: 123 lb 12.8 oz (56.2 kg)   Height: 5' 1\" (1.549 m)   PainSc:   0 - No pain      Body mass index is 23.39 kg/(m^2). Evaluation of Cognitive Function:  Mood/affect: Euthymic***  Appearance: Well-groomed***  Family member/caregiver input:***      General:   ***Well-nourished, well-groomed, pleasant, alert, in no acute distress. Head:  Normocephalic, atraumatic  Ears:  External ears WNL  Eyes:  Clear sclera  Neuro:   Alert, conversant, appropriate, following commands, no sensory deficit ***  Skin:    No rashes noted***  Psych: Affect, mood and judgment appropriate      Dementia Screen (Mini-Cog):   Three Item Recall: ***  Clock Drawing (ten past eleven) Exercise: ***      LABS   Data Review:   Lab Results   Component Value Date/Time    Sodium 139 09/08/2016 10:00 AM    Potassium 4.5 09/08/2016 10:00 AM    Chloride 104 09/08/2016 10:00 AM    CO2 26 09/08/2016 10:00 AM    Anion gap 9 09/08/2016 10:00 AM    Glucose 80 09/08/2016 10:00 AM    BUN 21 09/08/2016 10:00 AM    Creatinine 0.62 09/08/2016 10:00 AM    BUN/Creatinine ratio 34 09/08/2016 10:00 AM    GFR est AA >60 09/08/2016 10:00 AM    GFR est non-AA >60 09/08/2016 10:00 AM    Calcium 8.6 09/08/2016 10:00 AM       Lab Results   Component Value Date/Time    WBC 8.0 09/08/2016 10:00 AM    Hemoglobin (POC) 10.9 05/20/2013 11:02 AM    HGB 10.8 09/08/2016 10:00 AM    Hematocrit (POC) 32 05/20/2013 11:02 AM    HCT 34.7 09/08/2016 10:00 AM    PLATELET 994 38/27/1035 10:00 AM    MCV 84.2 09/08/2016 10:00 AM       No results found for: HBA1C, HGBE8, STY0HOVV, BYH0LYGZ    Lab Results   Component Value Date/Time    Cholesterol, total 146 09/08/2016 10:00 AM    HDL Cholesterol 97 09/08/2016 10:00 AM    LDL, calculated 37.2 09/08/2016 10:00 AM    VLDL, calculated 11.8 09/08/2016 10:00 AM    Triglyceride 59 09/08/2016 10:00 AM    CHOL/HDL Ratio 1.5 09/08/2016 10:00 AM       TESTS  ***    EKG:  ***    Patient Care Team:  Juliet Reed MD as PCP - General (Family Practice)  Robson Price MD (Cardiology)  Mayela Pena RN as Nurse Navigator (Internal Medicine)  Sheri Foster DO as Physician (Orthopedic Surgery)  Sarah Holt MD (Gastroenterology)  Marily Conrad MD as Hospitalist (Infectious Diseases)  Marlene Briseno MD (Dermatology)  Claribel Stokes MD (Ophthalmology)  Cristiano Paul MD (Oncology)  Mali Gibson (Endocrinology)    End-of-life planning  Advanced Directive in the case than an injury or illness causes the patient to be unable to make health care decisions was discussed with the patient. Advice/Referrals/Counselling/Plan:   Education and counseling provided:  {Education List, choose as appropriate:65394}  Include in education list (weight loss, physical activity, smoking cessation, fall prevention, and nutrition)  {Assessment and Plan:70144}. Brief written plan, checklist    Assessment/Plan:    There are no diagnoses linked to this encounter.       Lab review: {lab reviewed:794429}      Advance Care Planning  Advance Care Planning (ACP) Provider Conversation     Date of ACP Conversation: 02/07/17  Persons included in Conversation:  {Persons; patient/family/POA:72256}    Authorized Decision Maker (if patient is incapable of making informed decisions): This person is:   {Authorized Decision Maker:18119}          For Patients with Decision Making Capacity:   {initial care plan:30249}    Conversation Outcomes / Follow-Up Plan:   {ACP; outcomes:31920}        I have discussed the diagnosis with the patient and the intended plan as seen in the above orders. The patient has received an after-visit summary and questions were answered concerning future plans. I have discussed medication side effects and warnings with the patient as well. I have reviewed the plan of care with the patient, accepted their input and they are in agreement with the treatment goals.      Follow-up Disposition: Not on File

## 2017-02-07 NOTE — MR AVS SNAPSHOT
Visit Information Date & Time Provider Department Dept. Phone Encounter #  
 2/7/2017 11:45 AM Dav Smith MD Internist of [address] [id number] Your Appointments 6/13/2017 11:30 AM  
Office Visit with Dav Smith MD  
Internist of [address] [place] [address] [facility] Appt Note: 4 mth f/u  
 [address] [facility], [address] [facility] [address]  
  
   
 [address] [facility]  
  
    
 8/28/2017  1:20 PM  
Follow Up with Silvia East MD  
[facility] ([facility]) Appt Note: 1 year follow up [place] [address] [address] [address] [phone] [address] [facility] [address] [facility] [address] Upcoming Health Maintenance Date Due  
 MEDICARE YEARLY EXAM 6/9/1998 GLAUCOMA SCREENING Q2Y 10/14/2018 DTaP/Tdap/Td series (2 - Td) 5/27/2026 Allergies as of 2/7/2017  Review Complete On: 10/17/2016 By: Subha Han NP Severity Noted Reaction Type Reactions Etanercept High 10/21/2015    Other (comments) Caused bad infection behind knee 5/2015 Caused bad infection behind knee 5/2015 Morphine High 07/28/2014    Nausea and Vomiting N&V 
vomitting Haloperidol Lactate  01/03/2017    Other (comments) Hallucinations, agitation, increased BP Current Immunizations  Reviewed on 10/17/2016 Name Date Influenza Vaccine 10/9/2014 Influenza Vaccine (Quad) PF 10/17/2016 Influenza Vaccine PF 10/9/2013 Influenza Vaccine Split 11/1/2010 Influenza Vaccine Whole 10/11/2012 Pneumococcal Vaccine (Unspecified Type) 8/9/2012, 11/21/1994 Tdap 5/27/2016 Not reviewed this visit You Were Diagnosed With   
  
 Codes Comments Gastritis, presence of bleeding unspecified, unspecified chronicity, unspecified gastritis type    -  Primary ICD-10-CM: K29.70 ICD-9-CM: 535.50 Hypercholesterolemia     ICD-10-CM: E78.00 ICD-9-CM: 272.0 Other depression     ICD-10-CM: F32.89 Vitals BP Pulse Temp Resp Height(growth percentile) Weight(growth percentile) 125/76 (BP 1 Location: Right arm, BP Patient Position: Sitting) 87 97.9 °F (36.6 °C) (Oral) 12 5' 1\" (1.549 m) 123 lb 12.8 oz (56.2 kg) SpO2 BMI OB Status Smoking Status 97% 23.39 kg/m2 Hysterectomy Former Smoker BMI and BSA Data Body Mass Index Body Surface Area  
 23.39 kg/m 2 1.56 m 2 Preferred Pharmacy Pharmacy Name Phone [address] [facility] [phone] Your Updated Medication List  
  
   
This list is accurate as of: 2/7/17  1:47 PM.  Always use your most recent med list.  
  
  
  
  
 Eldon Collet Take 1 Tab by mouth two (2) times a day. CELEBREX PO Take 200 mg by mouth daily as needed. dabigatran etexilate 150 mg capsule Commonly known as:  PRADAXA Take 1 Cap by mouth two (2) times a day. dilTIAZem  mg ER capsule Commonly known as:  CARDIZEM CD  
TAKE 1 CAPSULE DAILY ferrous sulfate 325 mg (65 mg iron) tablet 325 mg two (2) times a day. losartan 25 mg tablet Commonly known as:  COZAAR  
TAKE 1 TABLET DAILY  
  
 methIMAzole 5 mg tablet Commonly known as:  TAPAZOLE Take 2.5 mg by mouth nightly. NexIUM 40 mg capsule Generic drug:  esomeprazole Take 1 Cap by mouth daily. PLAQUENIL 200 mg tablet Generic drug:  hydroxychloroquine Take 200 mg by mouth two (2) times a day. prednisoLONE acetate 1 % ophthalmic suspension Commonly known as:  PRED FORTE Administer 1 Drop to right eye two (2) times a day. REFRESH TEARS 0.5 % Drop ophthalmic solution Generic drug:  carboxymethylcellulose sodium Apply 1 Drop to eye two (2) times a day. RESTASIS 0.05 % ophthalmic emulsion Generic drug:  cycloSPORINE  
 Administer 1 Drop to both eyes two (2) times a day. sertraline 100 mg tablet Commonly known as:  ZOLOFT Take 1 Tab by mouth daily. simvastatin 10 mg tablet Commonly known as:  ZOCOR Take 1 Tab by mouth nightly. SYSTANE (PROPYLENE GLYCOL) 0.4-0.3 % Drop Generic drug:  peg 400-propylene glycol Administer 1 Drop to right eye as needed. TOBREX 0.3 % ophthalmic ointment Generic drug:  tobramycin PLACE 1/8TH INCH STRIP IN OD QHS UTD  
  
 VIGAMOX 0.5 % ophthalmic solution Generic drug:  moxifloxacin Administer 1 Drop to right eye daily. VITAMIN D3 1,000 unit tablet Generic drug:  cholecalciferol Take 1,000 Units by mouth daily. Prescriptions Printed Refills NEXIUM 40 mg capsule 3 Sig: Take 1 Cap by mouth daily. Class: Print Route: Oral  
 simvastatin (ZOCOR) 10 mg tablet 3 Sig: Take 1 Tab by mouth nightly. Class: Print Route: Oral  
 sertraline (ZOLOFT) 100 mg tablet 3 Sig: Take 1 Tab by mouth daily. Class: Print Route: Oral  
  
Patient Instructions Health Maintenance Due Topic Date Due  MEDICARE YEARLY EXAM  06/09/1998 Medicare Part B Preventive Services Limitations Recommendation Scheduled Bone Mass Measurement 
(age 72 & older, biennial) Requires diagnosis related to osteoporosis or estrogen deficiency. Biennial benefit unless patient has history of long-term glucocorticoid tx or baseline is needed because initial test was by other method This should be done at age 72 and again if on osteoporosis medication at 2-3 year intervals. Up to date Cardiovascular Screening Blood Tests (every 5 years) Total cholesterol, HDL, Triglycerides Order as a panel if possible We should check your cholesterol panel at least once every 5 years. Up to date Colorectal Cancer Screening 
-Fecal occult blood test (annual) -Flexible sigmoidoscopy (5y) 
-Screening colonoscopy (10y) -Barium Enema  Due per your Gastroenterologist's recommendations. Up to date Counseling to Prevent Tobacco Use (up to 8 sessions per year) - Counseling greater than 3 and up to 10 minutes - Counseling greater than 10 minutes Patients must be asymptomatic of tobacco-related conditions to receive as preventive service Continue with smoking cessation Not applicable Diabetes Screening Tests (at least every 3 years, Medicare covers annually or at 6-month intervals for prediabetic patients) Fasting blood sugar (FBS) or glucose tolerance test (GTT) Patient must be diagnosed with one of the following: 
-Hypertension, Dyslipidemia, obesity, previous impaired FBS or GTT 
Or any two of the following: overweight, FH of diabetes, age ? 72, history of gestational diabetes, birth of baby weighing more than 9 pounds Should be done yearly Up to date Diabetes Self-Management Training (DSMT) (no USPSTF recommendation) Requires referral by treating physician for patient with diabetes or renal disease. 10 hours of initial DSMT session of no less than 30 minutes each in a continuous 12-month period. 2 hours of follow-up DSMT in subsequent years. Not applicable Not applicable Glaucoma Screening (no USPSTF recommendation) Diabetes mellitus, family history, , age 48 or over,  American, age 72 or over Continue with annual eye exams. Up to date Human Immunodeficiency Virus (HIV) Screening (annually for increased risk patients) HIV-1 and HIV-2 by EIA, BACILIO, rapid antibody test, or oral mucosa transudate Patient must be at increased risk for HIV infection per USPSTF guidelines or pregnant. Tests covered annually for patients at increased risk. Pregnant patients may receive up to 3 test during pregnancy. Not applicable Not applicable Medical Nutrition Therapy (MNT) (for diabetes or renal disease not recommended schedule) Requires referral by treating physician for patient with diabetes or renal disease. Can be provided in same year as diabetes self-management training (DSMT), and CMS recommends medical nutrition therapy take place after DSMT. Up to 3 hours for initial year and 2 hours in subsequent years. Not applicable Not applicable Shingles Vaccination A shingles vaccine is also recommended once in a lifetime after age 61 Vaccination recommended for shingles vaccination. Overdue Seasonal Influenza Vaccination (annually)  Continue with yearly \"flu\" shot annually Up to date Pneumococcal Vaccination (once after 65)  Please receive this vaccination at age 72. Up to date Hepatitis B Vaccinations (if medium/high risk) Medium/high risk factors:  End-stage renal disease, Hemophiliacs who received Factor VIII or IX concentrates, Clients of institutions for the mentally retarded, Persons who live in the same house as a HepB virus carrier, Homosexual men, Illicit injectable drug abusers. Not applicable Not applicable Screening Mammography (biennial age 54-69) Annually (age 36 or over) You need a mammogram yearly to screen for breast cancer. Let me know if you want to get this done Screening Pap Tests and Pelvic Examination (up to age 79 and after 79 if unknown history or abnormal study last 10 years) Every 24 months except high risk You need no Pap smear at this time. Ultrasound Screening for Abdominal Aortic Aneurysm (AAA) (once) Patient must be referred through IPPE and not have had a screening for abdominal aortic aneurysm before under Medicare. Limited to patients who meet one of the following criteria: 
- Men who are 73-68 years old and have smoked more than 100 cigarettes in their lifetime. 
-Anyone with a FH of AAA 
-Anyone recommended for screening by USPSTF Not applicable Advance Care Planning: Care Instructions Your Care Instructions It can be hard to live with an illness that cannot be cured.  But if your health is getting worse, you may want to make decisions about end-of-life care. Planning for the end of your life does not mean that you are giving up. It is a way to make sure that your wishes are met. Clearly stating your wishes can make it easier for your loved ones. Making plans while you are still able may also ease your mind and make your final days less stressful and more meaningful. Follow-up care is a key part of your treatment and safety. Be sure to make and go to all appointments, and call your doctor if you are having problems. It's also a good idea to know your test results and keep a list of the medicines you take. What can you do to plan for the end of life? · You can bring these issues up with your doctor. You do not need to wait until your doctor starts the conversation. You might start with \"I would not be willing to live with . Coleen Galeana \" When you complete this sentence it helps your doctor understand your wishes. · Talk openly and honestly with your doctor. This is the best way to understand the decisions you will need to make as your health changes. Know that you can always change your mind. · Ask your doctor about commonly used life-support measures. These include tube feedings, breathing machines, and fluids given through a vein (IV). Understanding these treatments will help you decide whether you want them. · You may choose to have these life-supporting treatments for a limited time. This allows a trial period to see whether they will help you. You may also decide that you want your doctor to take only certain measures to keep you alive. It is important to spell out these conditions so that your doctor and family understand them. · Talk to your doctor about how long you are likely to live. He or she may be able to give you an idea of what usually happens with your specific illness. · Think about preparing papers that state your wishes.  This way there will not be any confusion about what you want. You can change your instructions at any time. Which papers should you prepare? Advance directives are legal papers that tell doctors how you want to be cared for at the end of your life. You do not need a  to write these papers. Ask your doctor or your [facility] department for information on how to write your advance directives. They may have the forms for each of these types of papers. Make sure your doctor has a copy of these on file, and give a copy to a family member or close friend. · Consider a do-not-resuscitate order (DNR). This order asks that no extra treatments be done if your heart stops or you stop breathing. Extra treatments may include electrical shock to restart your heart or a machine to breathe for you. If you decide to have a DNR order, ask your doctor to explain and write it. Place the order in your home where everyone can easily see it. · Consider a living will. A living will explains your wishes in case you are in a coma or cannot communicate. Living mars tell doctors to use or not use treatments that would keep you alive. You must have one or two witnesses or a notary present when you sign this form. · Consider a durable power of . This allows you to name a person to make decisions about your care if you are not able to. Most people ask a close friend or family member. Talk to this person about the kinds of treatments you want and those that you do not want. Make sure this person understands your wishes. If this person is not the health care agent named in your advance directive, also talk with your health care agent. These legal papers are simple to change. Tell your doctor what you want to change, and ask him or her to make a note in your medical file. Give your family updated copies of the papers. Where can you learn more? Go to http://elmo-milly.info/. Enter P184 in the search box to learn more about \"Advance Care Planning: Care Instructions. \" Current as of: February 24, 2016 Content Version: 11.1 © 2931-4254 [employer]. Care instructions adapted under license by [employer] (which disclaims liability or warranty for this information). If you have questions about a medical condition or this instruction, always ask your healthcare professional. [address] any warranty or liability for your use of this information. Introducing [facility]! Tomasa Mello introduces "[employer] patient portal. Now you can access parts of your medical record, email your doctor's office, and request medication refills online. 1. In your internet browser, go to [email or web address] 2. Click on the First Time User? Click Here link in the Sign In box. You will see the New Member Sign Up page. 3. Enter your "[employer] Access Code exactly as it appears below. You will not need to use this code after youve completed the sign-up process. If you do not sign up before the expiration date, you must request a new code. · "[employer] Access Code: [email or web address] Expires: 4/3/2017 12:36 PM 
 
4. Enter the last four digits of your Social Security Number (xxxx) and Date of Birth (mm/dd/yyyy) as indicated and click Submit. You will be taken to the next sign-up page. 5. Create a "[employer] ID. This will be your "[employer] login ID and cannot be changed, so think of one that is secure and easy to remember. 6. Create a "[employer] password. You can change your password at any time. 7. Enter your Password Reset Question and Answer. This can be used at a later time if you forget your password. 8. Enter your e-mail address. You will receive e-mail notification when new information is available in [address] 9. Click Sign Up. You can now view and download portions of your medical record. 10. Click the Download Summary menu link to download a portable copy of your medical information. If you have questions, please visit the Frequently Asked Questions section of the [employer] website. Remember, [employer] is NOT to be used for urgent needs. For medical emergencies, dial 911. Now available from your iPhone and Android! Please provide this summary of care documentation to your next provider. Your primary care clinician is listed as Addison Clancy. If you have any questions after today's visit, please call [phone].

## 2017-02-12 NOTE — ACP (ADVANCE CARE PLANNING)
Advance Care Planning  Advance Care Planning (ACP) Provider Conversation     Date of ACP Conversation: 02/7/17  Persons included in Conversation:  patient    Authorized Decision Maker (if patient is incapable of making informed decisions): This person is:   Healthcare Agent/Medical Power of  under Advance Directive - who pt wants to be a family member. (to be determined)        For Patients with Decision Making Capacity:   Values/Goals: Exploration of values, goals, and preferences if recovery is not expected, even with continued medical treatment in the event of:  Imminent death  Severe, permanent brain injury    Conversation Outcomes / Follow-Up Plan:   Recommended completion of Advance Directive form after review of ACP materials and conversation with prospective healthcare agent . Advanced Directive in the case that an injury or illness causes the patient to be unable to make health care decisions was discussed with the patient. Pt does not have an advanced directive. She wants her family not to be burdened by making decisions about her health if she is terminally ill. We reviewed a blank copy of the advanced directive. Pt wants to read it over at home prior to signing and completing it. All questions were answered.      Dr. Rhina Freitas  Internists of [address]  Phone: [phone]  Fax: [phone]

## 2017-04-01 ENCOUNTER — TELEPHONE (OUTPATIENT)
Dept: INTERNAL MEDICINE CLINIC | Age: 82
End: 2017-04-01

## 2017-04-01 PROBLEM — D50.9 IRON DEFICIENCY ANEMIA: Status: ACTIVE | Noted: 2017-04-01

## 2017-04-01 PROBLEM — D47.2 MGUS (MONOCLONAL GAMMOPATHY OF UNKNOWN SIGNIFICANCE): Status: ACTIVE | Noted: 2017-04-01

## 2017-04-01 NOTE — TELEPHONE ENCOUNTER
Please let the pt know that her liver function and thyroid function are within normal limits from February.      Dr. Topher Tapia  [facility]ists of [place] [address]  Phone: [phone]  Fax: [phone]

## 2017-06-13 ENCOUNTER — OFFICE VISIT (OUTPATIENT)
Dept: INTERNAL MEDICINE CLINIC | Age: 82
End: 2017-06-13

## 2017-06-13 VITALS
WEIGHT: 130.6 LBS | RESPIRATION RATE: 12 BRPM | HEART RATE: 104 BPM | SYSTOLIC BLOOD PRESSURE: 124 MMHG | DIASTOLIC BLOOD PRESSURE: 76 MMHG | OXYGEN SATURATION: 97 % | TEMPERATURE: 97.3 F | HEIGHT: 61 IN | BODY MASS INDEX: 24.66 KG/M2

## 2017-06-13 DIAGNOSIS — E05.90 HYPERTHYROIDISM: ICD-10-CM

## 2017-06-13 DIAGNOSIS — D47.2 MGUS (MONOCLONAL GAMMOPATHY OF UNKNOWN SIGNIFICANCE): ICD-10-CM

## 2017-06-13 DIAGNOSIS — M06.9 RHEUMATOID ARTHRITIS INVOLVING MULTIPLE SITES, UNSPECIFIED RHEUMATOID FACTOR PRESENCE: Primary | ICD-10-CM

## 2017-06-13 RX ORDER — FUROSEMIDE 40 MG/1
40 TABLET ORAL
COMMUNITY
Start: 2017-03-28 | End: 2017-10-16 | Stop reason: SDUPTHER

## 2017-06-13 RX ORDER — METHYLPREDNISOLONE 4 MG/1
TABLET ORAL
Qty: 1 DOSE PACK | Refills: 0 | Status: SHIPPED | OUTPATIENT
Start: 2017-06-13 | End: 2017-10-16 | Stop reason: ALTCHOICE

## 2017-06-13 RX ORDER — CELECOXIB 200 MG/1
200 CAPSULE ORAL
Qty: 90 CAP | Refills: 3 | Status: SHIPPED | COMMUNITY
Start: 2017-06-13

## 2017-06-13 NOTE — PROGRESS NOTES
INTERNISTS OF [facility]:  6/13/2017, MRN: [id number]      Dewey Quintanilla is a 80 y.o. female and presents to clinic for Hypertension (follow up); GERD (follow up); and Cholesterol Problem (follow up)    Subjective:   Pt is an 81yo female with h/o iron deficiency, DJD, mitral valve disease, goiter, depression, AF, hyperthyroidism, HTN, rheumatoid arthritis, gastritis, and MGUS, osteopenia. 1. Rheumatoid Arthritis: This is a chronic condition, present for several years. Patient is followed by Dr. Jordi Prasad. Since her last appointment, her RA began to flair up for the past 2 months. Her wrists are swollen and painful. +She also has pain mostly along bilateral shoulder jts. She is only taking Celebrex daily which improves her sx minimally. She has jt stiffness in her hands as well, with associated pain. Meanwhile, she reports no rashes or SOB. She is taking Plaquenil. She is to take Enbrel but due to severe infections, this medication was discontinued. 2. MGUS: The patient is followed Dr. Frances Gardner. She reports no abnormal bleeding or bruising. She occasionally will have pain along her cervical spine. She is due for a follow-up visit with Dr. Frances Gardner in August 2017. 3. Hyperthyroidism: This is a chronic condition, present for over 3 months. The patient is followed by Dr. Guadalupe Byrne. She has an appointment coming up later this summer. She presently takes methimazole.       Patient Active Problem List    Diagnosis Date Noted    Iron deficiency anemia 04/01/2017    MGUS (monoclonal gammopathy of unknown significance) 04/01/2017    Iron deficiency 02/07/2017    Osteoarthritis of right knee 01/29/2015    Mitral valve disorder 01/12/2015    Goiter, toxic, multinodular 03/30/2014    Depression 05/10/2012    Atrial fibrillation ([facility] 02/08/2012    Long term (current) use of anticoagulants 12/19/2011    Hyperthyroidism 12/14/2011    [facility] 07/21/2011    HTN (hypertension) 08/27/2010    Hypercholesterolemia 08/27/2010    Gastritis 08/27/2010    Osteopenia 08/27/2010       Current Outpatient Prescriptions   Medication Sig Dispense Refill    methylPREDNISolone (MEDROL DOSEPACK) 4 mg tablet Please take as directed by the package instructions. 1 Dose Pack 0    celecoxib (CELEBREX) 200 mg capsule Take 1 Cap by mouth daily as needed. 90 Cap 3    REFRESH TEARS 0.5 % drop ophthalmic solution Apply 1 Drop to eye two (2) times a day.  prednisoLONE acetate (PRED FORTE) 1 % ophthalmic suspension Administer 1 Drop to right eye two (2) times a day.  VIGAMOX 0.5 % ophthalmic solution Administer 1 Drop to right eye daily.  NEXIUM 40 mg capsule Take 1 Cap by mouth daily. 90 Cap 3    simvastatin (ZOCOR) 10 mg tablet Take 1 Tab by mouth nightly. 90 Tab 3    sertraline (ZOLOFT) 100 mg tablet Take 1 Tab by mouth daily. 90 Tab 3    dilTIAZem CD (CARDIZEM CD) 180 mg ER capsule TAKE 1 CAPSULE DAILY 90 Cap 2    losartan (COZAAR) 25 mg tablet TAKE 1 TABLET DAILY 90 Tab 3    dabigatran etexilate (PRADAXA) 150 mg capsule Take 1 Cap by mouth two (2) times a day. 180 Cap 3    ferrous sulfate 325 mg (65 mg iron) tablet 325 mg two (2) times a day.  hydroxychloroquine (PLAQUENIL) 200 mg tablet Take 200 mg by mouth two (2) times a day.  cycloSPORINE (RESTASIS) 0.05 % ophthalmic emulsion Administer 1 Drop to both eyes two (2) times a day.  peg 400-propylene glycol (SYSTANE) 0.4-0.3 % Drop Administer 1 Drop to right eye as needed.  methimazole (TAPAZOLE) 5 mg tablet Take 2.5 mg by mouth nightly.  cholecalciferol, vitamin d3, (VITAMIN D) 1,000 unit tablet Take 1,000 Units by mouth daily.  CALCIUM CITRATE (CALCITRATE PO) Take 1 Tab by mouth two (2) times a day.  furosemide (LASIX) 40 mg tablet Take 40 mg by mouth daily as needed.          Allergies   Allergen Reactions    Etanercept Other (comments)     Caused bad infection behind knee 5/2015  Caused bad infection behind knee 5/2015    Morphine Nausea and Vomiting     N&V  vomitting    Haloperidol Lactate Other (comments)     Hallucinations, agitation, increased BP       Past Medical History:   Diagnosis Date    Acute gastric ulcer without mention of hemorrhage, perforation, or obstruction 1989    surg repair    Anemia     Arrhythmia     Atrial fibrillation ([facility] 12/14/2011    Cataract 8-2001    RIGHT    Depression 4-    Edema leg RIGHT     NORMAL VENOUS DOPPLER    Goiter Nodular 7-30-01    History of echocardiogram 04/20/2015    EF 50-55%. No RWMA. Mild LVH. Indeterminate diastolic fx. RVSP 40 mmHg. Severe LAE. Mild JENNY. Mild MVP of anterior leaflet. Mod MR w/2 jets. Mod TR. No significant chg from study of 6/26/14.  History of Holter monitoring 01/09/2012    Well controlled atrial fibrillation. Avg HR 83 bpm.      Hypercholesterolemia     Hypertension     Hyperthyroidism 12/14/2011    Insomnia 6-18-09    Leg swelling     Lower extremity venous duplex 12/18/2013    Right leg:  No venous thrombosis.     Multinodular goiter     Nonspecific reaction to tuberculin skin test without active tuberculosis     Osteopenia 8/27/2010    Osteoporosis 2-2009    s/p reclast rx    Otitis media     Retina, separation 2004    detached retina    Rheumatoid arthritis ([facility]     rheumatoid    Septic arthritis [facility]) May 2015    R knee following with Ortho and ID    Skin cancer (melanoma) ([facility] 03/2013    Symptomatic menopausal or female climacteric states     Thyroid disease        Past Surgical History:   Procedure Laterality Date    HX BLEPHAROPLASTY      HX HEENT      hx detached retina    HX HIP REPLACEMENT      HX KNEE ARTHROSCOPY      scheduled for replacement but could not do due to infection    HX MOHS PROCEDURES  9-2002    LEFT    HX ORTHOPAEDIC      right leg sx    HX SKIN BIOPSY  04/05/2013    malignant melanoma    HX HANNY AND BSO  1967    MO COLONOSCOPY FLX DX W/COLLJ SPEC WHEN PFRMD  8-2001    polyp(tubular adenoma); Dr Francisco Causey  5-    (+)polyp (bx: tubular adenoma) Dr. [facility]       Family History   Problem Relation Age of Onset    Hypertension Mother     Heart Disease Mother     Osteoporosis Mother     Alcohol abuse Father        Social History   Substance Use Topics    Smoking status: Former Smoker     Years: 45.00     Types: Cigarettes     Quit date: 8/27/2003    Smokeless tobacco: Never Used    Alcohol use 0.0 oz/week     0 Standard drinks or equivalent per week      Comment: occasionally       ROS   Review of Systems   Constitutional: Negative for chills and fever. HENT: Negative for ear pain and sore throat. Eyes: Negative for blurred vision and pain. Respiratory: Negative for cough and shortness of breath. Cardiovascular: Negative for chest pain. Gastrointestinal: Negative for abdominal pain. Genitourinary: Negative for dysuria. Musculoskeletal: Positive for joint pain and neck pain. Negative for myalgias. Skin: Negative for rash. Neurological: Negative for tingling, focal weakness and headaches. Endo/Heme/Allergies: Does not bruise/bleed easily. Psychiatric/Behavioral: Negative for substance abuse. Objective     Vitals:    06/13/17 1140   BP: 124/76   Pulse: (!) 104   Resp: 12   Temp: 97.3 °F (36.3 °C)   TempSrc: Oral   SpO2: 97%   Weight: 130 lb 9.6 oz (59.2 kg)   Height: 5' 1\" (1.549 m)   PainSc:   8   PainLoc: Generalized       Physical Exam   Constitutional: She is oriented to person, place, and time and well-developed, well-nourished, and in no distress. HENT:   Head: Normocephalic and atraumatic. Right Ear: External ear normal.   Left Ear: External ear normal.   Nose: Nose normal.   Mouth/Throat: Oropharynx is clear and moist. No oropharyngeal exudate. Eyes: Conjunctivae and EOM are normal. Right eye exhibits no discharge. Left eye exhibits no discharge. No scleral icterus. Neck: Neck supple. Cardiovascular: Normal rate and intact distal pulses. Irregularly irregular heart rate   Pulmonary/Chest: Effort normal and breath sounds normal. No respiratory distress. She has no wheezes. She has no rales. Abdominal: Soft. Bowel sounds are normal. She exhibits no distension. There is no tenderness. There is no rebound and no guarding. Musculoskeletal: She exhibits edema (RLE>LLE - chronic, unchanged). She exhibits no tenderness (B/l shoulder jts are TTP. Her wrists and MCP jts are swollen and TTP. There is also increased warmth along her wrists and MCP jts). Lymphadenopathy:     She has no cervical adenopathy. Neurological: She is alert and oriented to person, place, and time. She exhibits normal muscle tone. Gait normal.   Skin: Skin is warm and dry. No erythema. Psychiatric: Affect normal.   Nursing note and vitals reviewed.       LABS   Data Review:   Lab Results   Component Value Date/Time    WBC 8.0 09/08/2016 10:00 AM    Hemoglobin (POC) 10.9 05/20/2013 11:02 AM    HGB 10.8 09/08/2016 10:00 AM    Hematocrit (POC) 32 05/20/2013 11:02 AM    HCT 34.7 09/08/2016 10:00 AM    PLATELET 583 30/98/4922 10:00 AM    MCV 84.2 09/08/2016 10:00 AM       Lab Results   Component Value Date/Time    Sodium 139 09/08/2016 10:00 AM    Potassium 4.5 09/08/2016 10:00 AM    Chloride 104 09/08/2016 10:00 AM    CO2 26 09/08/2016 10:00 AM    Anion gap 9 09/08/2016 10:00 AM    Glucose 80 09/08/2016 10:00 AM    BUN 21 09/08/2016 10:00 AM    Creatinine 0.62 09/08/2016 10:00 AM    BUN/Creatinine ratio 34 09/08/2016 10:00 AM    GFR est AA >60 09/08/2016 10:00 AM    GFR est non-AA >60 09/08/2016 10:00 AM    Calcium 8.6 09/08/2016 10:00 AM       Lab Results   Component Value Date/Time    Cholesterol, total 146 09/08/2016 10:00 AM    HDL Cholesterol 97 09/08/2016 10:00 AM    LDL, calculated 37.2 09/08/2016 10:00 AM    VLDL, calculated 11.8 09/08/2016 10:00 AM    Triglyceride 59 09/08/2016 10:00 AM CHOL/HDL Ratio 1.5 09/08/2016 10:00 AM       Assessment/Plan:   1. Rheumatoid arthritis involving multiple sites:  -A Medrol pack was ordered for treatment of rheumatoid arthritis flareup  -I encouraged patient to schedule a follow-up appointment sooner than her scheduled appointment with Dr. Richa Connolly to discuss treatment options.  -Continue with Plaquenil as prescribed. 2.  Hyperthyroidism: Stable. -Continue with methimazole per Dr. Zuleima Denise. 3. MGUS: Stable.  -I encouraged patient to follow-up with Dr. Leila Callaway in August 2017. There are no preventive care reminders to display for this patient. Lab review: labs are reviewed in the EHR    I have discussed the diagnosis with the patient and the intended plan as seen in the above orders. The patient has received an after-visit summary and questions were answered concerning future plans. I have discussed medication side effects and warnings with the patient as well. I have reviewed the plan of care with the patient, accepted their input and they are in agreement with the treatment goals. All questions were answered. The patient understands the plan of care. Handouts provided today with above information. Pt instructed if symptoms worsen to call the office or report to the ED for continued care. Greater than 50% of the visit time was spent in counseling and/or coordination of care. Follow-up Disposition:  Return in about 4 months (around 10/13/2017) for BP check.     Kayla Melton MD

## 2017-06-13 NOTE — PROGRESS NOTES
Chief Complaint   Patient presents with    Hypertension     follow up    GERD     follow up    Cholesterol Problem     follow up     Patient still has a copy of the Advanced Directive form and understands to bring it in once completed. 1. Have you been to the ER, urgent care clinic since your last visit? Hospitalized since your last visit? No    2. Have you seen or consulted any other health care providers outside of the [address] [place] [address] since your last visit? Include any pap smears or colon screening.  No

## 2017-06-13 NOTE — PATIENT INSTRUCTIONS
There are no preventive care reminders to display for this patient.   Patient still has a copy of the Advanced Directive form and understands to bring it in once completed

## 2017-06-13 NOTE — MR AVS SNAPSHOT
Visit Information Date & Time Provider Department Dept. Phone Encounter #  
 6/13/2017 11:30 AM Nathaly Del Real MD Internist of [facility] [place] [id number] Follow-up Instructions Return in about 4 months (around 10/13/2017) for BP check. Your Appointments 8/28/2017  1:20 PM  
Follow Up with Alicia Rodriguez MD  
[facility] ([facility]) Appt Note: 1 year follow up [facility] [address] [address] [phone] [address] [facility] [address] [facility]  
  
    
 10/16/2017  1:30 PM  
Office Visit with Nathaly Del Real MD  
Internist of [address] [place] [address] Appt Note: 4 week follow up  
 [address], [address] [address] [address] [place]  
  
   
 [address] [facility] Upcoming Health Maintenance Date Due INFLUENZA AGE 9 TO ADULT 8/1/2017 MEDICARE YEARLY EXAM 2/8/2018 GLAUCOMA SCREENING Q2Y 10/14/2018 DTaP/Tdap/Td series (2 - Td) 5/27/2026 Allergies as of 6/13/2017  Review Complete On: 6/13/2017 By: Mj Rowe Severity Noted Reaction Type Reactions Etanercept High 10/21/2015    Other (comments) Caused bad infection behind knee 5/2015 Caused bad infection behind knee 5/2015 Morphine High 07/28/2014    Nausea and Vomiting N&V 
vomitting Haloperidol Lactate  01/03/2017    Other (comments) Hallucinations, agitation, increased BP Current Immunizations  Reviewed on 10/17/2016 Name Date Influenza Vaccine 10/9/2014 Influenza Vaccine (Quad) PF 10/17/2016 Influenza Vaccine PF 10/9/2013 Influenza Vaccine Split 11/1/2010 Influenza Vaccine Whole 10/11/2012 Pneumococcal Vaccine (Unspecified Type) 8/9/2012, 11/21/1994 Tdap 5/27/2016 Not reviewed this visit You Were Diagnosed With   
  
 Codes Comments Rheumatoid arthritis involving multiple sites, unspecified rheumatoid factor presence ([facility]    -  Primary ICD-10-CM: M06.9 ICD-9-CM: 714.0 Vitals BP Pulse Temp Resp Height(growth percentile) Weight(growth percentile) 124/76 (BP 1 Location: Left arm, BP Patient Position: Sitting) (!) 104 97.3 °F (36.3 °C) (Oral) 12 5' 1\" (1.549 m) 130 lb 9.6 oz (59.2 kg) SpO2 BMI OB Status Smoking Status 97% 24.68 kg/m2 Hysterectomy Former Smoker BMI and BSA Data Body Mass Index Body Surface Area  
 24.68 kg/m 2 1.6 m 2 Preferred Pharmacy Pharmacy Name Phone [address] [facility] [phone] Your Updated Medication List  
  
   
This list is accurate as of: 6/13/17 12:25 PM.  Always use your most recent med list.  
  
  
  
  
 [place] Hutching Take 1 Tab by mouth two (2) times a day. celecoxib 200 mg capsule Commonly known as:  CeleBREX Take 1 Cap by mouth daily as needed. dabigatran etexilate 150 mg capsule Commonly known as:  PRADAXA Take 1 Cap by mouth two (2) times a day. dilTIAZem  mg ER capsule Commonly known as:  CARDIZEM CD  
TAKE 1 CAPSULE DAILY ferrous sulfate 325 mg (65 mg iron) tablet 325 mg two (2) times a day. furosemide 40 mg tablet Commonly known as:  LASIX Take 40 mg by mouth daily as needed. losartan 25 mg tablet Commonly known as:  COZAAR  
TAKE 1 TABLET DAILY  
  
 methIMAzole 5 mg tablet Commonly known as:  TAPAZOLE Take 2.5 mg by mouth nightly. methylPREDNISolone 4 mg tablet Commonly known as:  Brand Saint Please take as directed by the package instructions. NexIUM 40 mg capsule Generic drug:  esomeprazole Take 1 Cap by mouth daily. PLAQUENIL 200 mg tablet Generic drug:  hydroxychloroquine Take 200 mg by mouth two (2) times a day. prednisoLONE acetate 1 % ophthalmic suspension Commonly known as:  PRED FORTE Administer 1 Drop to right eye two (2) times a day. REFRESH TEARS 0.5 % Drop ophthalmic solution Generic drug:  carboxymethylcellulose sodium Apply 1 Drop to eye two (2) times a day. RESTASIS 0.05 % ophthalmic emulsion Generic drug:  cycloSPORINE Administer 1 Drop to both eyes two (2) times a day. sertraline 100 mg tablet Commonly known as:  ZOLOFT Take 1 Tab by mouth daily. simvastatin 10 mg tablet Commonly known as:  ZOCOR Take 1 Tab by mouth nightly. SYSTANE (PROPYLENE GLYCOL) 0.4-0.3 % Drop Generic drug:  peg 400-propylene glycol Administer 1 Drop to right eye as needed. VIGAMOX 0.5 % ophthalmic solution Generic drug:  moxifloxacin Administer 1 Drop to right eye daily. VITAMIN D3 1,000 unit tablet Generic drug:  cholecalciferol Take 1,000 Units by mouth daily. Prescriptions Sent to Mail Order Refills  
 celecoxib (CELEBREX) 200 mg capsule 3 Sig: Take 1 Cap by mouth daily as needed. Class: Mail Order Pharmacy: [address], [address] Ph #: [phone] Route: Oral  
  
Prescriptions Sent to Pharmacy Refills  
 methylPREDNISolone (MEDROL DOSEPACK) 4 mg tablet 0 Sig: Please take as directed by the package instructions. Class: Normal  
 Pharmacy: [address], [place] Ph #: [phone] Follow-up Instructions Return in about 4 months (around 10/13/2017) for BP check. Patient Instructions There are no preventive care reminders to display for this patient. Patient still has a copy of the Advanced Directive form and understands to bring it in once completed Introducing [facility]! Karla Logan introduces [employer] patient portal. Now you can access parts of your medical record, email your doctor's office, and request medication refills online. 1. In your internet browser, go to [email or web address] 2. Click on the First Time User? Click Here link in the Sign In box. You will see the New Member Sign Up page. 3. Enter your [employer] Access Code exactly as it appears below. You will not need to use this code after youve completed the sign-up process. If you do not sign up before the expiration date, you must request a new code. · [employer] Access Code: [email or web address] Expires: 9/11/2017 12:25 PM 
 
4. Enter the last four digits of your Social Security Number (xxxx) and Date of Birth (mm/dd/yyyy) as indicated and click Submit. You will be taken to the next sign-up page. 5. Create a [employer] ID. This will be your [employer] login ID and cannot be changed, so think of one that is secure and easy to remember. 6. Create a [employer] password. You can change your password at any time. 7. Enter your Password Reset Question and Answer. This can be used at a later time if you forget your password. 8. Enter your e-mail address. You will receive e-mail notification when new information is available in [address] 9. Click Sign Up. You can now view and download portions of your medical record. 10. Click the Download Summary menu link to download a portable copy of your medical information. If you have questions, please visit the Frequently Asked Questions section of the [employer] website. Remember, [employer] is NOT to be used for urgent needs. For medical emergencies, dial 911. Now available from your iPhone and Android! Please provide this summary of care documentation to your next provider. Your primary care clinician is listed as Austin Clayton. If you have any questions after today's visit, please call [phone].

## 2017-08-16 DIAGNOSIS — F32.89 OTHER DEPRESSION: ICD-10-CM

## 2017-08-17 RX ORDER — SERTRALINE HYDROCHLORIDE 100 MG/1
100 TABLET, FILM COATED ORAL DAILY
Qty: 90 TAB | Refills: 3 | OUTPATIENT
Start: 2017-08-17

## 2017-08-17 RX ORDER — SERTRALINE HYDROCHLORIDE 100 MG/1
100 TABLET, FILM COATED ORAL DAILY
Qty: 90 TAB | Refills: 3 | Status: SHIPPED | OUTPATIENT
Start: 2017-08-17 | End: 2018-07-26 | Stop reason: SDUPTHER

## 2017-08-17 NOTE — TELEPHONE ENCOUNTER
Hello Nurses,   Please forward to the appropriate physician. When I am on call I get the requests after hours from the Answering Service. Otherwise these should be sent to the physician at the practice. Thanks.   TAMEKA

## 2017-09-12 RX ORDER — DILTIAZEM HYDROCHLORIDE 180 MG/1
CAPSULE, COATED, EXTENDED RELEASE ORAL
Qty: 90 CAP | Refills: 3 | Status: SHIPPED | OUTPATIENT
Start: 2017-09-12 | End: 2018-09-07 | Stop reason: SDUPTHER

## 2017-09-24 DIAGNOSIS — M79.89 LEG SWELLING: ICD-10-CM

## 2017-09-24 RX ORDER — FUROSEMIDE 40 MG/1
TABLET ORAL
Qty: 90 TAB | Refills: 3 | Status: SHIPPED | OUTPATIENT
Start: 2017-09-24 | End: 2019-01-01 | Stop reason: ALTCHOICE

## 2017-10-16 ENCOUNTER — HOSPITAL ENCOUNTER (OUTPATIENT)
Dept: LAB | Age: 82
Discharge: HOME | End: 2017-10-16
Payer: MEDICARE

## 2017-10-16 ENCOUNTER — OFFICE VISIT (OUTPATIENT)
Dept: INTERNAL MEDICINE CLINIC | Age: 82
End: 2017-10-16

## 2017-10-16 VITALS
HEART RATE: 91 BPM | BODY MASS INDEX: 25.57 KG/M2 | RESPIRATION RATE: 12 BRPM | WEIGHT: 135.4 LBS | HEIGHT: 61 IN | SYSTOLIC BLOOD PRESSURE: 108 MMHG | OXYGEN SATURATION: 96 % | DIASTOLIC BLOOD PRESSURE: 62 MMHG | TEMPERATURE: 97.1 F

## 2017-10-16 DIAGNOSIS — E05.90 HYPERTHYROIDISM: ICD-10-CM

## 2017-10-16 DIAGNOSIS — M06.9 RHEUMATOID ARTHRITIS INVOLVING MULTIPLE SITES, UNSPECIFIED RHEUMATOID FACTOR PRESENCE: ICD-10-CM

## 2017-10-16 DIAGNOSIS — I48.19 PERSISTENT ATRIAL FIBRILLATION (HCC): ICD-10-CM

## 2017-10-16 DIAGNOSIS — E05.20 GOITER, TOXIC, MULTINODULAR: ICD-10-CM

## 2017-10-16 DIAGNOSIS — D47.2 MGUS (MONOCLONAL GAMMOPATHY OF UNKNOWN SIGNIFICANCE): ICD-10-CM

## 2017-10-16 DIAGNOSIS — Z23 ENCOUNTER FOR IMMUNIZATION: ICD-10-CM

## 2017-10-16 DIAGNOSIS — D50.8 OTHER IRON DEFICIENCY ANEMIA: ICD-10-CM

## 2017-10-16 DIAGNOSIS — Z12.11 SCREENING FOR COLON CANCER: ICD-10-CM

## 2017-10-16 DIAGNOSIS — I10 ESSENTIAL HYPERTENSION: ICD-10-CM

## 2017-10-16 DIAGNOSIS — K62.5 RECTAL BLEEDING: ICD-10-CM

## 2017-10-16 DIAGNOSIS — Z79.01 LONG TERM CURRENT USE OF ANTICOAGULANT THERAPY: ICD-10-CM

## 2017-10-16 DIAGNOSIS — K62.5 RECTAL BLEEDING: Primary | ICD-10-CM

## 2017-10-16 LAB
ALBUMIN SERPL-MCNC: 3.2 G/DL (ref 3.4–5)
ALBUMIN/GLOB SERPL: 1 {RATIO} (ref 0.8–1.7)
ALP SERPL-CCNC: 216 U/L (ref 45–117)
ALT SERPL-CCNC: 20 U/L (ref 13–56)
ANION GAP SERPL CALC-SCNC: 7 MMOL/L (ref 3–18)
AST SERPL-CCNC: 21 U/L (ref 15–37)
BASOPHILS # BLD: 0 K/UL (ref 0–0.06)
BASOPHILS NFR BLD: 1 % (ref 0–2)
BILIRUB SERPL-MCNC: 0.4 MG/DL (ref 0.2–1)
BUN SERPL-MCNC: 23 MG/DL (ref 7–18)
BUN/CREAT SERPL: 20 (ref 12–20)
CALCIUM SERPL-MCNC: 8.8 MG/DL (ref 8.5–10.1)
CHLORIDE SERPL-SCNC: 106 MMOL/L (ref 100–108)
CHOLEST SERPL-MCNC: 170 MG/DL
CO2 SERPL-SCNC: 26 MMOL/L (ref 21–32)
CREAT SERPL-MCNC: 1.13 MG/DL (ref 0.6–1.3)
DIFFERENTIAL METHOD BLD: ABNORMAL
EOSINOPHIL # BLD: 0.1 K/UL (ref 0–0.4)
EOSINOPHIL NFR BLD: 1 % (ref 0–5)
ERYTHROCYTE [DISTWIDTH] IN BLOOD BY AUTOMATED COUNT: 15.9 % (ref 11.6–14.5)
GLOBULIN SER CALC-MCNC: 3.2 G/DL (ref 2–4)
GLUCOSE SERPL-MCNC: 95 MG/DL (ref 74–99)
HCT VFR BLD AUTO: 28.3 % (ref 35–45)
HDLC SERPL-MCNC: 98 MG/DL (ref 40–60)
HDLC SERPL: 1.7 {RATIO} (ref 0–5)
HGB BLD-MCNC: 8.4 G/DL (ref 12–16)
LDLC SERPL CALC-MCNC: 54.8 MG/DL (ref 0–100)
LIPID PROFILE,FLP: ABNORMAL
LYMPHOCYTES # BLD: 0.8 K/UL (ref 0.9–3.6)
LYMPHOCYTES NFR BLD: 13 % (ref 21–52)
MCH RBC QN AUTO: 28.4 PG (ref 24–34)
MCHC RBC AUTO-ENTMCNC: 29.7 G/DL (ref 31–37)
MCV RBC AUTO: 95.6 FL (ref 74–97)
MONOCYTES # BLD: 0.4 K/UL (ref 0.05–1.2)
MONOCYTES NFR BLD: 6 % (ref 3–10)
NEUTS SEG # BLD: 5.3 K/UL (ref 1.8–8)
NEUTS SEG NFR BLD: 79 % (ref 40–73)
PLATELET # BLD AUTO: 367 K/UL (ref 135–420)
PMV BLD AUTO: 8.8 FL (ref 9.2–11.8)
POTASSIUM SERPL-SCNC: 5.2 MMOL/L (ref 3.5–5.5)
PROT SERPL-MCNC: 6.4 G/DL (ref 6.4–8.2)
RBC # BLD AUTO: 2.96 M/UL (ref 4.2–5.3)
SODIUM SERPL-SCNC: 139 MMOL/L (ref 136–145)
T4 FREE SERPL-MCNC: 0.7 NG/DL (ref 0.7–1.5)
TRIGL SERPL-MCNC: 86 MG/DL (ref ?–150)
TSH SERPL DL<=0.05 MIU/L-ACNC: 6.95 UIU/ML (ref 0.36–3.74)
VLDLC SERPL CALC-MCNC: 17.2 MG/DL
WBC # BLD AUTO: 6.6 K/UL (ref 4.6–13.2)

## 2017-10-16 PROCEDURE — 82043 UR ALBUMIN QUANTITATIVE: CPT | Performed by: INTERNAL MEDICINE

## 2017-10-16 PROCEDURE — 84439 ASSAY OF FREE THYROXINE: CPT | Performed by: INTERNAL MEDICINE

## 2017-10-16 PROCEDURE — 85025 COMPLETE CBC W/AUTO DIFF WBC: CPT | Performed by: INTERNAL MEDICINE

## 2017-10-16 PROCEDURE — 80061 LIPID PANEL: CPT | Performed by: INTERNAL MEDICINE

## 2017-10-16 PROCEDURE — 80053 COMPREHEN METABOLIC PANEL: CPT | Performed by: INTERNAL MEDICINE

## 2017-10-16 PROCEDURE — 36415 COLL VENOUS BLD VENIPUNCTURE: CPT | Performed by: INTERNAL MEDICINE

## 2017-10-16 RX ORDER — DOXYCYCLINE 100 MG/1
CAPSULE ORAL
Refills: 6 | COMMUNITY
Start: 2017-10-05 | End: 2018-01-11 | Stop reason: ALTCHOICE

## 2017-10-16 RX ORDER — POLYMYXIN B SULFATE AND TRIMETHOPRIM 1; 10000 MG/ML; [USP'U]/ML
SOLUTION OPHTHALMIC
Refills: 6 | COMMUNITY
Start: 2017-10-09 | End: 2018-01-11 | Stop reason: ALTCHOICE

## 2017-10-16 RX ORDER — TOBRAMYCIN 3 MG/ML
1 SOLUTION/ DROPS OPHTHALMIC
COMMUNITY
Start: 2017-10-15 | End: 2019-01-01 | Stop reason: ALTCHOICE

## 2017-10-16 RX ORDER — BESIFLOXACIN 6 MG/ML
SUSPENSION OPHTHALMIC
Refills: 0 | COMMUNITY
Start: 2017-10-09 | End: 2018-02-22 | Stop reason: ALTCHOICE

## 2017-10-16 RX ORDER — GENTAMICIN SULFATE 3 MG/G
0.5 OINTMENT OPHTHALMIC
Refills: 3 | COMMUNITY
Start: 2017-10-05

## 2017-10-16 NOTE — PROGRESS NOTES
INTERNISTS OF [facility]:  10/17/2017, MRN: [id number]      Robert Tomlinson is a 80 y.o. female and presents to clinic for Hypertension (follow up) and Arthritis (Rheumatoid follow up)    Subjective:   Pt is an 79yo female with h/o iron deficiency, DJD, mitral valve disease, goiter, depression, AF, hyperthyroidism, HTN, rheumatoid arthritis, gastritis, MGUS, and osteopenia. 1. RA: +Chronic condition, present for several yrs. +Followed by . The patient has no pain along her wrist today. Pain is mostly along bilateral shoulder joints. She continues take Celebrex which helps to improve her symptoms. She is on Plaquenil. She reports no adverse side effects with taking this medication. Note that she used to take Enbrel but this medication was discontinued due to recurrent complicated infections. 2. HTN and AF: The patient has a long-standing history of hypertension and atrial fibrillation, present for at least several months. She is followed by the cardiology team.  She had a plan with the cardiology team tomorrow. She is on Pradaxa, diltiazem, Lasix, and losartan. She reports no adverse side effects to taking these medications but has recent rectal bleeding. Please see #3. 3. Rectal Bleeding: She has BRBPR for the past 4wks. Last episode occurred 2 days ago. She is on Pradaxa and Celebrex. No constipation. No straining. No diarrhea. No international travel. No raw seafood/shellfish ingestion. No vomiting. No hematuria, vaginal bleeding, or melanotic stools. +H/o gastritis. On Nexium. 4.  Right eye infection: +Has a h/o corneal transplant last year. On abx drops x 3 (polytrim,vigamox, tobramycin). Seen by the ophthalmology team just 2 weeks ago and is scheduled for f/u later this year. 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 No fever or chills. She also uses Besivance and restasis eye drops. 5.  Hyperthyroidism:  This is a chronic condition, present for at least several months. She is followed by Dr. Tamika Calderón. She is on methimazole and reports no adverse side effects of taking this medication. Per patient history, her most recent PFTs were unremarkable. 6.  Iron deficiency anemia and MGUS: The patient is on iron supplementation. The patient is followed by Dr. Brittani Arreguin. +H/o gastritis      Patient Active Problem List    Diagnosis Date Noted    Iron deficiency anemia 04/01/2017    MGUS (monoclonal gammopathy of unknown significance) 04/01/2017    Osteoarthritis of right knee 01/29/2015    Mitral valve disorder 01/12/2015    Goiter, toxic, multinodular 03/30/2014    Depression 05/10/2012    Atrial fibrillation ([facility] [address] 02/08/2012    Long term (current) use of anticoagulants 12/19/2011    Hyperthyroidism 12/14/2011    RA (rheumatoid arthritis) ([facility] 07/21/2011    HTN (hypertension) 08/27/2010    Hypercholesterolemia 08/27/2010    Gastritis 08/27/2010    Osteopenia 08/27/2010       Current Outpatient Prescriptions   Medication Sig Dispense Refill    BESIVANCE 0.6 % drps ophthalmic suspension INSTILL 1 DROP OD Q HOUR WHILE AWAKE  0    doxycycline (VIBRAMYCIN) 100 mg capsule TK ONE C PO BID  6    GENTAK 0.3 % (3 mg/gram) ophthalmic ointment   3    trimethoprim-polymyxin b (POLYTRIM) ophthalmic solution INT 1 GTT OD QID  6    tobramycin (TOBREX) 0.3 % ophthalmic solution       furosemide (LASIX) 40 mg tablet TAKE 1 TABLET DAILY FOR EDEMA 90 Tab 3    dilTIAZem CD (CARDIZEM CD) 180 mg ER capsule TAKE 1 CAPSULE DAILY 90 Cap 3    sertraline (ZOLOFT) 100 mg tablet Take 1 Tab by mouth daily. 90 Tab 3    celecoxib (CELEBREX) 200 mg capsule Take 1 Cap by mouth daily as needed. 90 Cap 3    REFRESH TEARS 0.5 % drop ophthalmic solution Apply 1 Drop to eye two (2) times a day.  prednisoLONE acetate (PRED FORTE) 1 % ophthalmic suspension Administer 1 Drop to right eye two (2) times a day.  NEXIUM 40 mg capsule Take 1 Cap by mouth daily. 90 Cap 3    simvastatin (ZOCOR) 10 mg tablet Take 1 Tab by mouth nightly. 90 Tab 3    losartan (COZAAR) 25 mg tablet TAKE 1 TABLET DAILY 90 Tab 3    dabigatran etexilate (PRADAXA) 150 mg capsule Take 1 Cap by mouth two (2) times a day. 180 Cap 3    hydroxychloroquine (PLAQUENIL) 200 mg tablet Take 200 mg by mouth two (2) times a day.  cycloSPORINE (RESTASIS) 0.05 % ophthalmic emulsion Administer 1 Drop to both eyes two (2) times a day.  peg 400-propylene glycol (SYSTANE) 0.4-0.3 % Drop Administer 1 Drop to right eye as needed.  methimazole (TAPAZOLE) 5 mg tablet Take 2.5 mg by mouth nightly.  cholecalciferol, vitamin d3, (VITAMIN D) 1,000 unit tablet Take 1,000 Units by mouth daily.  CALCIUM CITRATE (CALCITRATE PO) Take 1 Tab by mouth two (2) times a day.  VIGAMOX 0.5 % ophthalmic solution Administer 1 Drop to right eye daily.  ferrous sulfate 325 mg (65 mg iron) tablet 325 mg two (2) times a day. Allergies   Allergen Reactions    Etanercept Other (comments)     Caused bad infection behind knee 5/2015  Caused bad infection behind knee 5/2015  Caused bad infection behind knee 5/2015  Caused bad infection behind knee 5/2015    Morphine Nausea and Vomiting and Other (comments)     N&V  N&V  vomitting    Haloperidol Other (comments)     Hallucinations, agitation, increased BP    Haloperidol Lactate Other (comments)     Hallucinations, agitation, increased BP  Hallucinations, agitation, increased BP       Past Medical History:   Diagnosis Date    Acute gastric ulcer without mention of hemorrhage, perforation, or obstruction 1989    surg repair    Anemia     Arrhythmia     Atrial fibrillation ([facility] [address] 12/14/2011    Cataract 8-2001    RIGHT    Depression 4-    Edema leg RIGHT     NORMAL VENOUS DOPPLER    Goiter Nodular 7-30-01    History of echocardiogram 04/20/2015    EF 50-55%. No RWMA. Mild LVH. Indeterminate diastolic fx. RVSP 40 mmHg. Severe LAE. Mild JENNY. Mild MVP of anterior leaflet. Mod MR w/2 jets. Mod TR. No significant chg from study of 6/26/14.  History of Holter monitoring 01/09/2012    Well controlled atrial fibrillation. Avg HR 83 bpm.      Hypercholesterolemia     Hypertension     Hyperthyroidism 12/14/2011    Insomnia 6-18-09    Leg swelling     Lower extremity venous duplex 12/18/2013    Right leg:  No venous thrombosis.     Multinodular goiter     Nonspecific reaction to tuberculin skin test without active tuberculosis     Osteopenia 8/27/2010    Osteoporosis 2-2009    s/p reclast rx    Otitis media     Retina, separation 2004    detached retina    Rheumatoid arthritis(714.0)     rheumatoid    Septic arthritis [facility]) May 2015    R knee following with Ortho and ID    Skin cancer (melanoma) ([facility] [address] 03/2013    Symptomatic menopausal or female climacteric states     Thyroid disease        Past Surgical History:   Procedure Laterality Date    HX BLEPHAROPLASTY      HX HEENT      hx detached retina    HX HIP REPLACEMENT      HX KNEE ARTHROSCOPY      scheduled for replacement but could not do due to infection    HX MOHS PROCEDURES  9-2002    LEFT    HX ORTHOPAEDIC      right leg sx    HX SKIN BIOPSY  04/05/2013    malignant melanoma    HX HANNY AND BSO  1967    [place] COLONOSCOPY FLX DX W/COLLJ SPEC WHEN PFRMD  8-2001    polyp(tubular adenoma); Dr Tonya Carias  5-    (+)polyp (bx: tubular adenoma) Dr. Annalise Giraldo       Family History   Problem Relation Age of Onset    Hypertension Mother     Heart Disease Mother     Osteoporosis Mother     Alcohol abuse Father        Social History   Substance Use Topics    Smoking status: Former Smoker     Years: 45.00     Types: Cigarettes     Quit date: 8/27/2003    Smokeless tobacco: Never Used    Alcohol use 0.0 oz/week     0 Standard drinks or equivalent per week      Comment: occasionally       ROS   Review of Systems   Constitutional: Negative for chills and fever. HENT: Negative for ear pain and sore throat. Eyes: Negative for pain. Respiratory: Negative for shortness of breath. Cardiovascular: Negative for chest pain. Gastrointestinal: Negative for abdominal pain, blood in stool, constipation, diarrhea and melena. Genitourinary: Negative for dysuria and hematuria. Musculoskeletal: Negative for joint pain and myalgias. Skin: Negative for rash. Neurological: Negative for focal weakness and headaches. Endo/Heme/Allergies: Does not bruise/bleed easily. Psychiatric/Behavioral: Negative for substance abuse. Objective     Vitals:    10/16/17 1352   BP: 108/62   Pulse: 91   Resp: 12   Temp: 97.1 °F (36.2 °C)   TempSrc: Oral   SpO2: 96%   Weight: 135 lb 6.4 oz (61.4 kg)   Height: 5' 1\" (1.549 m)   PainSc:   5   PainLoc: Generalized       Physical Exam   Constitutional: She is oriented to person, place, and time and well-developed, well-nourished, and in no distress. HENT:   Head: Normocephalic and atraumatic. Right Ear: External ear normal.   Left Ear: External ear normal.   Nose: Nose normal.   Mouth/Throat: Oropharynx is clear and moist. No oropharyngeal exudate. Clear TMs   Eyes: No scleral icterus. Right eye retina/pupil is cloudy/erythematous in color. Left eye exam is unremarkable. Neck: Neck supple. Cardiovascular: Normal rate, regular rhythm, normal heart sounds and intact distal pulses. Exam reveals no gallop and no friction rub. No murmur heard. Pulmonary/Chest: Effort normal and breath sounds normal. No respiratory distress. She has no wheezes. She has no rales. Abdominal: Soft. Bowel sounds are normal. She exhibits no distension. There is no tenderness. There is no rebound and no guarding. No heptomegaly   Musculoskeletal: She exhibits no edema or tenderness (BUE are NTTP). Lymphadenopathy:     She has no cervical adenopathy.    Neurological: She is alert and oriented to person, place, and time. She exhibits normal muscle tone. Gait normal.   Skin: Skin is warm and dry. No erythema. Psychiatric: Affect normal.   Nursing note and vitals reviewed. LABS   Data Review:   Lab Results   Component Value Date/Time    WBC 6.6 10/16/2017 02:56 PM    Hemoglobin, POC 10.9 05/20/2013 11:02 AM    HGB 8.4 10/16/2017 02:56 PM    Hematocrit, POC 32 05/20/2013 11:02 AM    HCT 28.3 10/16/2017 02:56 PM    PLATELET 594 68/77/6043 02:56 PM    MCV 95.6 10/16/2017 02:56 PM       Lab Results   Component Value Date/Time    Sodium 139 10/16/2017 02:56 PM    Potassium 5.2 10/16/2017 02:56 PM    Chloride 106 10/16/2017 02:56 PM    CO2 26 10/16/2017 02:56 PM    Anion gap 7 10/16/2017 02:56 PM    Glucose 95 10/16/2017 02:56 PM    BUN 23 10/16/2017 02:56 PM    Creatinine 1.13 10/16/2017 02:56 PM    BUN/Creatinine ratio 20 10/16/2017 02:56 PM    GFR est AA 56 10/16/2017 02:56 PM    GFR est non-AA 46 10/16/2017 02:56 PM    Calcium 8.8 10/16/2017 02:56 PM       Lab Results   Component Value Date/Time    Cholesterol, total 170 10/16/2017 02:56 PM    HDL Cholesterol 98 10/16/2017 02:56 PM    LDL, calculated 54.8 10/16/2017 02:56 PM    VLDL, calculated 17.2 10/16/2017 02:56 PM    Triglyceride 86 10/16/2017 02:56 PM    CHOL/HDL Ratio 1.7 10/16/2017 02:56 PM       Assessment/Plan:   1. Hyperthyroidism  -Continue with endocrinology follow-up. Continue with methimazole per endocrinology recommendations.  -We will check a CBC, a CMP, and TFTs. ORDERS:  - CBC WITH AUTOMATED DIFF; Future  - METABOLIC PANEL, COMPREHENSIVE; Future  - TSH AND FREE T4; Future    2. Essential hypertension and AF: +Rectal bleeding - from hemorrhoids? Intestinal bleed?  -I encouraged patient to continue with all medications as prescribed  With the exception of Pradaxa-she has an appointment with the cardiology team tomorrow. I encouraged her to discuss whether this medication should be continued in the setting of rectal bleeding.   If it is from hemorrhoidal bleeding, then no changes should be made. If she has occult gastrointestinal bleed is more significant than hemorrhoidal bleeding, then this medication (Pradaxa), should be discontinued. In that circumstance, the patient may benefit from something like aspirin once her intestinal bleed has resolved. We will check a CMP, lipid panel, TFTs, and a urine protein screen. We are also checking a CBC as mentioned below to screen for an occult GI bleed/anemia. ORDERS:  - METABOLIC PANEL, COMPREHENSIVE; Future  - LIPID PANEL; Future  - TSH AND FREE T4; Future  - MICROALBUMIN, UR, RAND W/ MICROALBUMIN/CREA RATIO; Future  - CBC WITH AUTOMATED DIFF; Future    3. Other iron deficiency anemia: Has h/o iron deficiency. +MGUS. +H/o gastritis  -Continue with iron Rx. Meanwhile, I will screen her for anemia with a CBC, CMP, and TFTs and the setting of recent rectal bleeding. ORDERS:  - CBC WITH AUTOMATED DIFF; Future  - METABOLIC PANEL, COMPREHENSIVE; Future  - TSH AND FREE T4; Future    4. Rheumatoid arthritis involving multiple sites: Stable. -Continue to follow-up with Dr. Carissa Méndez  -Continue with Plaquenil    ORDERS:  - CBC WITH AUTOMATED DIFF; Future  - METABOLIC PANEL, COMPREHENSIVE; Future  - TSH AND FREE T4; Future    5. Rectal bleeding: Has MGUS and iron deficiency anemia. +RA - anemia of chronic disease? +H/o gastritis  -Checking a CBC and a CMP. -Placing a referral to the GI team for evaluation.  -I encouraged the patient to discuss discontinuing her Pradaxa pending GI evaluation.  -We discussed the need to stop Celebrex . Patient will discuss this with Dr. Luan Ribera:  - Claudene Monarch DIFF; Future  - METABOLIC PANEL, COMPREHENSIVE; Future  - REFERRAL TO GASTROENTEROLOGY    6. Encounter for immunization  -The flu vaccine was administered today. ORDERS  - Influenza virus vaccine ([address]) 72 years and older (00[address])  - Administration fee () for Medicare insured patients    7.   Right eye infection:  -I requesting records from her ophthalmologist.  -Continue with treatment as prescribed by the ophthalmology team      There are no preventive care reminders to display for this patient. Lab review: labs are reviewed in the EHR and ordered as mentioned above    I have discussed the diagnosis with the patient and the intended plan as seen in the above orders. The patient has received an after-visit summary and questions were answered concerning future plans. I have discussed medication side effects and warnings with the patient as well. I have reviewed the plan of care with the patient, accepted their input and they are in agreement with the treatment goals. All questions were answered. The patient understands the plan of care. Handouts provided today with above information. Pt instructed if symptoms worsen to call the office or report to the ED for continued care. Greater than 50% of the visit time was spent in counseling and/or coordination of care. Follow-up Disposition:  Return in about 11 weeks (around 1/1/2018) for BP check.     Betsy Deluca MD

## 2017-10-16 NOTE — ACP (ADVANCE CARE PLANNING)
Patient still has a copy of the Advanced Directive form and understands to bring it in once complete

## 2017-10-16 NOTE — PROGRESS NOTES
Chief Complaint   Patient presents with    Hypertension     follow up    Arthritis     Rheumatoid follow up     Patient still has a copy of the Advanced Directive form and understands to bring it in once completed. 1. Have you been to the ER, urgent care clinic since your last visit? Hospitalized since your last visit? No    2. Have you seen or consulted any other health care providers outside of the [address] [place] [address] since your last visit? Include any pap smears or colon screening. Alysa    Robert Tomlinson is a 80 y.o. female who presents for routine immunizations. She denies any symptoms , reactions or allergies that would exclude them from being immunized today. Risks and adverse reactions were discussed and the VIS was given to them. All questions were addressed. She was observed for 20 min post injection. There were no reactions observed.     Deyvi Marshall

## 2017-10-16 NOTE — MR AVS SNAPSHOT
Visit Information Date & Time Provider Department Dept. Phone Encounter #  
 10/16/2017  1:30 PM Amirah Chase MD [facility] [phone] [id number] Follow-up Instructions Return in about 11 weeks (around 1/1/2018) for BP check. Your Appointments 10/17/2017  2:00 PM  
Follow Up with Gladys Carlson MD  
[facility] [place] [address] ([address]) Appt Note: 1 year follow up; .  
 [address] [facility] [address]  
[phone] [address] [facility] [address] [address]  
  
    
 1/3/2018  2:00 PM  
Office Visit with Amirah Chase MD  
[facility] [address]) Appt Note: ov 11wks alberts [address] [facility] Selinda Duke [address] [place]  
  
   
 [address], [facility] Upcoming Health Maintenance Date Due  
 MEDICARE YEARLY EXAM 2/8/2018 GLAUCOMA SCREENING Q2Y 8/3/2019 DTaP/Tdap/Td series (2 - Td) 5/27/2026 Allergies as of 10/16/2017  Review Complete On: 10/16/2017 By: Berlin Isbell Severity Noted Reaction Type Reactions Etanercept High 10/21/2015    Other (comments) Caused bad infection behind knee 5/2015 Caused bad infection behind knee 5/2015 Caused bad infection behind knee 5/2015 Caused bad infection behind knee 5/2015 Morphine High 07/28/2014    Nausea and Vomiting, Other (comments) N&V 
N&V 
vomitting Haloperidol  01/03/2017    Other (comments) Hallucinations, agitation, increased BP Haloperidol Lactate  01/03/2017    Other (comments) Hallucinations, agitation, increased BP Hallucinations, agitation, increased BP Current Immunizations  Reviewed on 10/17/2016 Name Date Influenza High Dose Vaccine PF 10/16/2017 Influenza Vaccine 10/9/2014 Influenza Vaccine (Quad) PF 10/17/2016 Influenza Vaccine PF 10/9/2013 Influenza Vaccine Split 11/1/2010 Influenza Vaccine Whole 10/11/2012 Tdap 5/27/2016 ZZZ-RETIRED (DO NOT USE) Pneumococcal Vaccine (Unspecified Type) 8/9/2012, 11/21/1994 Not reviewed this visit You Were Diagnosed With   
  
 Codes Comments Screening for colon cancer    -  Primary ICD-10-CM: Z12.11 ICD-9-CM: V76.51 Hyperthyroidism     ICD-10-CM: E05.90 ICD-9-CM: 242.90 Essential hypertension     ICD-10-CM: I10 
ICD-9-CM: 401.9 Goiter, toxic, multinodular     ICD-10-CM: E05.20 ICD-9-CM: 242.20 Other iron deficiency anemia     ICD-10-CM: D50.8 ICD-9-CM: 280.8 Persistent atrial fibrillation (HCC)     ICD-10-CM: I48.1 ICD-9-CM: 427.31 Rheumatoid arthritis involving multiple sites, unspecified rheumatoid factor presence ([facility].)     ICD-10-CM: M06.9 ICD-9-CM: 714.0 Rectal bleeding     ICD-10-CM: K62.5 ICD-9-CM: 569.3 Encounter for immunization     ICD-10-CM: W14 ICD-9-CM: V03.89 Vitals BP Pulse Temp Resp Height(growth percentile) Weight(growth percentile) 108/62 (BP 1 Location: Left arm, BP Patient Position: Sitting) 91 97.1 °F (36.2 °C) (Oral) 12 5' 1\" (1.549 m) 135 lb 6.4 oz (61.4 kg) SpO2 BMI OB Status Smoking Status 96% 25.58 kg/m2 Hysterectomy Former Smoker BMI and BSA Data Body Mass Index Body Surface Area 25.58 kg/m 2 1.63 m 2 Preferred Pharmacy Pharmacy Name Phone 100 [facility] [phone] Your Updated Medication List  
  
   
This list is accurate as of: 10/16/17  2:56 PM.  Always use your most recent med list.  
  
  
  
  
 BESIVANCE 0.6 % Drps ophthalmic suspension Generic drug:  besifloxacin INSTILL 1 DROP OD Q HOUR WHILE AWAKE  
  
 CALCITRATE PO Take 1 Tab by mouth two (2) times a day. celecoxib 200 mg capsule Commonly known as:  CeleBREX Take 1 Cap by mouth daily as needed. dabigatran etexilate 150 mg capsule Commonly known as:  PRADAXA Take 1 Cap by mouth two (2) times a day. dilTIAZem  mg ER capsule Commonly known as:  CARDIZEM CD  
TAKE 1 CAPSULE DAILY  
  
 doxycycline 100 mg capsule Commonly known as:  VIBRAMYCIN  
TK ONE C PO BID  
  
 ferrous sulfate 325 mg (65 mg iron) tablet 325 mg two (2) times a day. furosemide 40 mg tablet Commonly known as:  LASIX TAKE 1 TABLET DAILY FOR EDEMA  
  
 GENTAK 0.3 % (3 mg/gram) ophthalmic ointment Generic drug:  gentamicin  
  
 losartan 25 mg tablet Commonly known as:  COZAAR  
TAKE 1 TABLET DAILY  
  
 methIMAzole 5 mg tablet Commonly known as:  TAPAZOLE Take 2.5 mg by mouth nightly. NexIUM 40 mg capsule Generic drug:  esomeprazole Take 1 Cap by mouth daily. PLAQUENIL 200 mg tablet Generic drug:  hydroxychloroquine Take 200 mg by mouth two (2) times a day. prednisoLONE acetate 1 % ophthalmic suspension Commonly known as:  PRED FORTE Administer 1 Drop to right eye two (2) times a day. REFRESH TEARS 0.5 % Drop ophthalmic solution Generic drug:  carboxymethylcellulose sodium Apply 1 Drop to eye two (2) times a day. RESTASIS 0.05 % ophthalmic emulsion Generic drug:  cycloSPORINE Administer 1 Drop to both eyes two (2) times a day. sertraline 100 mg tablet Commonly known as:  ZOLOFT Take 1 Tab by mouth daily. simvastatin 10 mg tablet Commonly known as:  ZOCOR Take 1 Tab by mouth nightly. SYSTANE (PROPYLENE GLYCOL) 0.4-0.3 % Drop Generic drug:  peg 400-propylene glycol Administer 1 Drop to right eye as needed. tobramycin 0.3 % ophthalmic solution Commonly known as:  TOBREX  
  
 trimethoprim-polymyxin b ophthalmic solution Commonly known as:  POLYTRIM  
INT 1 GTT OD QID  
  
 VIGAMOX 0.5 % ophthalmic solution Generic drug:  moxifloxacin Administer 1 Drop to right eye daily. VITAMIN D3 1,000 unit tablet Generic drug:  cholecalciferol Take 1,000 Units by mouth daily. We Performed the Following ADMIN INFLUENZA VIRUS VAC [ [facility]] INFLUENZA VIRUS VACCINE, HIGH DOSE SEASONAL, PRESERVATIVE FREE [01105 CPT(R)] REFERRAL TO GASTROENTEROLOGY [BCS87 Custom] Follow-up Instructions Return in about 11 weeks (around 1/1/2018) for BP check. To-Do List   
 10/16/2017 Lab:  CBC WITH AUTOMATED DIFF   
  
 10/16/2017 Lab:  LIPID PANEL   
  
 10/16/2017 Lab:  METABOLIC PANEL, COMPREHENSIVE   
  
 10/16/2017 Lab:  MICROALBUMIN, UR, RAND W/ MICROALBUMIN/CREA RATIO Around 10/16/2017 Lab:  TSH AND FREE T4 Referral Information Referral ID Referred By Referred To  
  
 [id number] Abbey Chapin Not Available Visits Status Start Date End Date 1 New Request 10/16/17 10/16/18 If your referral has a status of pending review or denied, additional information will be sent to support the outcome of this decision. Patient Instructions Patient still has a copy of the Advanced Directive form and understands to bring it in once completed. There are no preventive care reminders to display for this patient. Introducing [facility]! Carlotta Flores introduces [employer] patient portal. Now you can access parts of your medical record, email your doctor's office, and request medication refills online. 1. In your internet browser, go to [email or web address] 2. Click on the First Time User? Click Here link in the Sign In box. You will see the New Member Sign Up page. 3. Enter your [employer] Access Code exactly as it appears below. You will not need to use this code after youve completed the sign-up process. If you do not sign up before the expiration date, you must request a new code. · [employer] Access Code: [email or web address] Expires: 1/14/2018  1:29 PM 
 
 4. Enter the last four digits of your Social Security Number (xxxx) and Date of Birth (mm/dd/yyyy) as indicated and click Submit. You will be taken to the next sign-up page. 5. Create a i-Nalysis ID. This will be your [employer] login ID and cannot be changed, so think of one that is secure and easy to remember. 6. Create a [employer] password. You can change your password at any time. 7. Enter your Password Reset Question and Answer. This can be used at a later time if you forget your password. 8. Enter your e-mail address. You will receive e-mail notification when new information is available in [address] 9. Click Sign Up. You can now view and download portions of your medical record. 10. Click the Download Summary menu link to download a portable copy of your medical information. If you have questions, please visit the Frequently Asked Questions section of the [employer] website. Remember, [employer] is NOT to be used for urgent needs. For medical emergencies, dial 911. Now available from your iPhone and Android! Please provide this summary of care documentation to your next provider. Your primary care clinician is listed as Janie Blake. If you have any questions after today's visit, please call [phone].

## 2017-10-16 NOTE — PATIENT INSTRUCTIONS
Patient still has a copy of the Advanced Directive form and understands to bring it in once completed. There are no preventive care reminders to display for this patient.

## 2017-10-17 ENCOUNTER — OFFICE VISIT (OUTPATIENT)
Dept: CARDIOLOGY CLINIC | Age: 82
End: 2017-10-17

## 2017-10-17 VITALS
SYSTOLIC BLOOD PRESSURE: 120 MMHG | OXYGEN SATURATION: 91 % | BODY MASS INDEX: 25.3 KG/M2 | WEIGHT: 134 LBS | HEIGHT: 61 IN | DIASTOLIC BLOOD PRESSURE: 78 MMHG | HEART RATE: 77 BPM

## 2017-10-17 DIAGNOSIS — I48.19 PERSISTENT ATRIAL FIBRILLATION (HCC): Primary | ICD-10-CM

## 2017-10-17 DIAGNOSIS — I10 ESSENTIAL HYPERTENSION WITH GOAL BLOOD PRESSURE LESS THAN 130/80: ICD-10-CM

## 2017-10-17 DIAGNOSIS — R60.9 DEPENDENT EDEMA: ICD-10-CM

## 2017-10-17 DIAGNOSIS — E78.00 HYPERCHOLESTEROLEMIA: ICD-10-CM

## 2017-10-17 DIAGNOSIS — D64.9 ANEMIA, UNSPECIFIED TYPE: ICD-10-CM

## 2017-10-17 DIAGNOSIS — R09.89 BRUIT: ICD-10-CM

## 2017-10-17 LAB
CREAT UR-MCNC: 292.73 MG/DL (ref 30–125)
MICROALBUMIN UR-MCNC: 2.2 MG/DL (ref 0–3)
MICROALBUMIN/CREAT UR-RTO: 8 MG/G (ref 0–30)

## 2017-10-17 NOTE — MR AVS SNAPSHOT
Visit Information Date & Time Provider Department Dept. Phone Encounter #  
 10/17/2017  2:00 PM Kindra Mayfield MD [facility] [phone] Your Appointments 1/3/2018  2:00 PM  
Office Visit with Jg Poole MD  
[facility] [place] [address]) Appt Note: ov 11wks alberts [address] [place]  
  
   
 [address], [address] Upcoming Health Maintenance Date Due  
 MEDICARE YEARLY EXAM 2/8/2018 GLAUCOMA SCREENING Q2Y 8/3/2019 DTaP/Tdap/Td series (2 - Td) 5/27/2026 Allergies as of 10/17/2017  Review Complete On: 10/17/2017 By: Jg Poole MD  
  
 Severity Noted Reaction Type Reactions Etanercept High 10/21/2015    Other (comments) Caused bad infection behind knee 5/2015 Caused bad infection behind knee 5/2015 Caused bad infection behind knee 5/2015 Caused bad infection behind knee 5/2015 Morphine High 07/28/2014    Nausea and Vomiting, Other (comments) N&V 
N&V 
vomitting Haloperidol  01/03/2017    Other (comments) Hallucinations, agitation, increased BP Haloperidol Lactate  01/03/2017    Other (comments) Hallucinations, agitation, increased BP Hallucinations, agitation, increased BP Current Immunizations  Reviewed on 10/17/2016 Name Date Influenza High Dose Vaccine PF 10/16/2017 Influenza Vaccine 10/9/2014 Influenza Vaccine (Quad) PF 10/17/2016 Influenza Vaccine PF 10/9/2013 Influenza Vaccine Split 11/1/2010 Influenza Vaccine Whole 10/11/2012 Tdap 5/27/2016 ZZZ-RETIRED (DO NOT USE) Pneumococcal Vaccine (Unspecified Type) 8/9/2012, 11/21/1994 Not reviewed this visit You Were Diagnosed With   
  
 Codes Comments Persistent atrial fibrillation (HCC)    -  Primary ICD-10-CM: I48.1 ICD-9-CM: 427.31 Vitals BP Pulse Height(growth percentile) Weight(growth percentile) SpO2 BMI  
 120/78 77 5' 1\" (1.549 m) 134 lb (60.8 kg) 91% 25.32 kg/m2 OB Status Smoking Status Hysterectomy Former Smoker Vitals History BMI and BSA Data Body Mass Index Body Surface Area  
 25.32 kg/m 2 1.62 m 2 Preferred Pharmacy Pharmacy Name Phone 100 Jessica Sprague [facility] [phone] Your Updated Medication List  
  
   
This list is accurate as of: 10/17/17  3:11 PM.  Always use your most recent med list.  
  
  
  
  
 BESIVANCE 0.6 % Drps ophthalmic suspension Generic drug:  besifloxacin INSTILL 1 DROP OD Q HOUR WHILE AWAKE  
  
 CALCITRATE PO Take 1 Tab by mouth two (2) times a day. celecoxib 200 mg capsule Commonly known as:  CeleBREX Take 1 Cap by mouth daily as needed. dabigatran etexilate 150 mg capsule Commonly known as:  PRADAXA Take 1 Cap by mouth two (2) times a day. dilTIAZem  mg ER capsule Commonly known as:  CARDIZEM CD  
TAKE 1 CAPSULE DAILY  
  
 doxycycline 100 mg capsule Commonly known as:  VIBRAMYCIN  
TK ONE C PO BID  
  
 furosemide 40 mg tablet Commonly known as:  LASIX TAKE 1 TABLET DAILY FOR EDEMA  
  
 GENTAK 0.3 % (3 mg/gram) ophthalmic ointment Generic drug:  gentamicin  
  
 losartan 25 mg tablet Commonly known as:  COZAAR  
TAKE 1 TABLET DAILY  
  
 methIMAzole 5 mg tablet Commonly known as:  TAPAZOLE Take 2.5 mg by mouth nightly. NexIUM 40 mg capsule Generic drug:  esomeprazole Take 1 Cap by mouth daily. PLAQUENIL 200 mg tablet Generic drug:  hydroxychloroquine Take 200 mg by mouth two (2) times a day. prednisoLONE acetate 1 % ophthalmic suspension Commonly known as:  PRED FORTE Administer 1 Drop to right eye two (2) times a day. REFRESH TEARS 0.5 % Drop ophthalmic solution Generic drug:  carboxymethylcellulose sodium Apply 1 Drop to eye two (2) times a day. RESTASIS 0.05 % ophthalmic emulsion Generic drug:  cycloSPORINE Administer 1 Drop to both eyes two (2) times a day. sertraline 100 mg tablet Commonly known as:  ZOLOFT Take 1 Tab by mouth daily. simvastatin 10 mg tablet Commonly known as:  ZOCOR Take 1 Tab by mouth nightly. SYSTANE (PROPYLENE GLYCOL) 0.4-0.3 % Drop Generic drug:  peg 400-propylene glycol Administer 1 Drop to right eye as needed. tobramycin 0.3 % ophthalmic solution Commonly known as:  TOBREX  
  
 trimethoprim-polymyxin b ophthalmic solution Commonly known as:  POLYTRIM  
INT 1 GTT OD QID  
  
 VIGAMOX 0.5 % ophthalmic solution Generic drug:  moxifloxacin Administer 1 Drop to right eye daily. VITAMIN D3 1,000 unit tablet Generic drug:  cholecalciferol Take 1,000 Units by mouth daily. We Performed the Following AMB POC EKG ROUTINE W/ 12 LEADS, INTER & REP [81610 CPT(R)] Introducing [facility]! Khushi Hopson introduces [employer] patient portal. Now you can access parts of your medical record, email your doctor's office, and request medication refills online. 1. In your internet browser, go to [email or web address] 2. Click on the First Time User? Click Here link in the Sign In box. You will see the New Member Sign Up page. 3. Enter your [employer] Access Code exactly as it appears below. You will not need to use this code after youve completed the sign-up process. If you do not sign up before the expiration date, you must request a new code. · [employer] Access Code: [email or web address] Expires: 1/14/2018  1:29 PM 
 
4. Enter the last four digits of your Social Security Number (xxxx) and Date of Birth (mm/dd/yyyy) as indicated and click Submit. You will be taken to the next sign-up page. 5. Create a [employer] ID.  This will be your [employer] login ID and cannot be changed, so think of one that is secure and easy to remember. 6. Create a [employer] password. You can change your password at any time. 7. Enter your Password Reset Question and Answer. This can be used at a later time if you forget your password. 8. Enter your e-mail address. You will receive e-mail notification when new information is available in [address] 9. Click Sign Up. You can now view and download portions of your medical record. 10. Click the Download Summary menu link to download a portable copy of your medical information. If you have questions, please visit the Frequently Asked Questions section of the [employer] website. Remember, [employer] is NOT to be used for urgent needs. For medical emergencies, dial 911. Now available from your iPhone and Android! Please provide this summary of care documentation to your next provider. Your primary care clinician is listed as Umang Basilio. If you have any questions after today's visit, please call [phone].

## 2017-10-17 NOTE — PROGRESS NOTES
1. Have you been to the ER, urgent care clinic since your last visit? Hospitalized since your last visit? no  2. Have you seen or consulted any other health care providers outside of the [address] [place] [address] since your last visit? Include any pap smears or colon screening.   no

## 2017-10-17 NOTE — PROGRESS NOTES
HISTORY OF PRESENT ILLNESS  Oz Wei is a 80 y.o. female. Irregular Heart Beat    Associated symptoms include lower extremity edema. Pertinent negatives include no fever, no chest pain, no claudication, no orthopnea, no PND, no abdominal pain, no nausea, no vomiting, no headaches, no dizziness, no cough and no shortness of breath. Leg Swelling   Pertinent negatives include no chest pain, no abdominal pain, no headaches and no shortness of breath. Patient presents for a follow-up office visit. She was originally referred here for new onset atrial fibrillation. Patient has a past medical history significant for hypertension, dyslipidemia, long-standing rheumatoid arthritis, and recently diagnosed hyperthyroidism. The patient was started on anticoagulation initially with warfarin and then switched to Pradaxa. She has been managed with rate controlling agents. She was initially started on metoprolol for rate control, however this was then switched to verapamil, because she could not tolerate the beta blocker due to to extreme fatigue. However, she is now managed with diltiazem for rate control. A follow-up echocardiogram in September 2016 showed low normal left ventricular ejection fraction 50-55% with severe left atrial enlargement and mild pulmonary hypertension, PASP 40 mmHg. She was last seen in the office approximately a year ago. Since last visit, she required a right eye transplant. More recently, she was diagnosed with anemia which appears fairly chronic over the past 6 months with a hemoglobin level between 8.5 and 10.0. She complains of occasional dizzy spells when she stands up too quickly, but no viviane syncope. She also has chronic leg swelling to her shins which has not particularly changed over the past year or 2. She denies any chest pain or shortness of breath, no orthopnea, no PND.     Past Medical History:   Diagnosis Date    Acute gastric ulcer without mention of hemorrhage, perforation, or obstruction 1989    surg repair    Anemia     Arrhythmia     Atrial fibrillation ([facility] [address] 12/14/2011    Cataract 8-2001    RIGHT    Depression 4-    Edema leg RIGHT     NORMAL VENOUS DOPPLER    Goiter Nodular 7-30-01    History of echocardiogram 04/20/2015    EF 50-55%. No RWMA. Mild LVH. Indeterminate diastolic fx. RVSP 40 mmHg. Severe LAE. Mild JENNY. Mild MVP of anterior leaflet. Mod MR w/2 jets. Mod TR. No significant chg from study of 6/26/14.  History of Holter monitoring 01/09/2012    Well controlled atrial fibrillation. Avg HR 83 bpm.      Hypercholesterolemia     Hypertension     Hyperthyroidism 12/14/2011    Insomnia 6-18-09    Leg swelling     Lower extremity venous duplex 12/18/2013    Right leg:  No venous thrombosis.     Multinodular goiter     Nonspecific reaction to tuberculin skin test without active tuberculosis     Osteopenia 8/27/2010    Osteoporosis 2-2009    s/p reclast rx    Otitis media     Retina, separation 2004    detached retina    Rheumatoid arthritis(714.0)     rheumatoid    Septic arthritis [facility]) May 2015    R knee following with Ortho and ID    Skin cancer (melanoma) ([facility] [address] 03/2013    Symptomatic menopausal or female climacteric states     Thyroid disease       Current Outpatient Prescriptions   Medication Sig Dispense Refill    BESIVANCE 0.6 % drps ophthalmic suspension INSTILL 1 DROP OD Q HOUR WHILE AWAKE  0    doxycycline (VIBRAMYCIN) 100 mg capsule TK ONE C PO BID  6    GENTAK 0.3 % (3 mg/gram) ophthalmic ointment   3    trimethoprim-polymyxin b (POLYTRIM) ophthalmic solution INT 1 GTT OD QID  6    tobramycin (TOBREX) 0.3 % ophthalmic solution       furosemide (LASIX) 40 mg tablet TAKE 1 TABLET DAILY FOR EDEMA (Patient taking differently: TAKE 1 TABLET BY MOUTH EVERY OTHER DAY FOR EDEMA) 90 Tab 3    dilTIAZem CD (CARDIZEM CD) 180 mg ER capsule TAKE 1 CAPSULE DAILY 90 Cap 3    sertraline (ZOLOFT) 100 mg tablet Take 1 Tab by mouth daily. 90 Tab 3    celecoxib (CELEBREX) 200 mg capsule Take 1 Cap by mouth daily as needed. 90 Cap 3    REFRESH TEARS 0.5 % drop ophthalmic solution Apply 1 Drop to eye two (2) times a day.  prednisoLONE acetate (PRED FORTE) 1 % ophthalmic suspension Administer 1 Drop to right eye two (2) times a day.  VIGAMOX 0.5 % ophthalmic solution Administer 1 Drop to right eye daily.  NEXIUM 40 mg capsule Take 1 Cap by mouth daily. 90 Cap 3    simvastatin (ZOCOR) 10 mg tablet Take 1 Tab by mouth nightly. 90 Tab 3    losartan (COZAAR) 25 mg tablet TAKE 1 TABLET DAILY 90 Tab 3    dabigatran etexilate (PRADAXA) 150 mg capsule Take 1 Cap by mouth two (2) times a day. 180 Cap 3    hydroxychloroquine (PLAQUENIL) 200 mg tablet Take 200 mg by mouth two (2) times a day.  cycloSPORINE (RESTASIS) 0.05 % ophthalmic emulsion Administer 1 Drop to both eyes two (2) times a day.  peg 400-propylene glycol (SYSTANE) 0.4-0.3 % Drop Administer 1 Drop to right eye as needed.  methimazole (TAPAZOLE) 5 mg tablet Take 2.5 mg by mouth nightly.  cholecalciferol, vitamin d3, (VITAMIN D) 1,000 unit tablet Take 1,000 Units by mouth daily.  CALCIUM CITRATE (CALCITRATE PO) Take 1 Tab by mouth two (2) times a day.            Allergies   Allergen Reactions    Etanercept Other (comments)     Caused bad infection behind knee 5/2015  Caused bad infection behind knee 5/2015  Caused bad infection behind knee 5/2015  Caused bad infection behind knee 5/2015    Morphine Nausea and Vomiting and Other (comments)     N&V  N&V  vomitting    Haloperidol Other (comments)     Hallucinations, agitation, increased BP    Haloperidol Lactate Other (comments)     Hallucinations, agitation, increased BP  Hallucinations, agitation, increased BP        Social History   Substance Use Topics    Smoking status: Former Smoker     Years: 45.00     Types: Cigarettes     Quit date: 8/27/2003    Smokeless tobacco: Never Used    Alcohol use 0.0 oz/week     0 Standard drinks or equivalent per week      Comment: occasionally         Review of Systems   Constitutional: Negative for chills, fever and weight loss. HENT: Negative for nosebleeds. Eyes: Negative for blurred vision and double vision. Respiratory: Negative for cough, shortness of breath and wheezing. Cardiovascular: Positive for leg swelling. Negative for chest pain, palpitations, orthopnea, claudication and PND. Gastrointestinal: Negative for abdominal pain, heartburn, nausea and vomiting. Genitourinary: Negative for dysuria and hematuria. Musculoskeletal: Negative for falls, joint pain and myalgias. Skin: Negative for rash. Neurological: Negative for dizziness, focal weakness and headaches. Endo/Heme/Allergies: Does not bruise/bleed easily. Psychiatric/Behavioral: Negative for substance abuse. Visit Vitals    /78    Pulse 77    Ht 5' 1\" (1.549 m)    Wt 60.8 kg (134 lb)    SpO2 91%    BMI 25.32 kg/m2      Physical Exam   Constitutional: She is oriented to person, place, and time. She appears well-developed and well-nourished. HENT:   Head: Normocephalic and atraumatic. Eyes: Conjunctivae are normal.   Neck: Neck supple. No JVD present. Carotid bruit is present ( Right). Cardiovascular: S1 normal, S2 normal and normal pulses. An irregularly irregular rhythm present. Exam reveals no gallop. Murmur heard. High-pitched blowing holosystolic murmur is present with a grade of 2/6  at the apex  Pulmonary/Chest: Effort normal and breath sounds normal. She has no wheezes. She has no rales. Abdominal: Soft. Bowel sounds are normal. There is no tenderness. Musculoskeletal: She exhibits edema (1+ edema bilaterally to the shins). Neurological: She is alert and oriented to person, place, and time. Skin: Skin is warm and dry.      EKG: Atrial fibrillation, normal axis, heart rate in the 70s, poor R-wave progression, nonspecific ST abnormality. No change compared to the previous EKG. ASSESSMENT and PLAN    Persistent atrial fibrillation. Initially diagnosed in December 2011, which now appears to be persistent in nature. She remains on Pradaxa 150 mg bid for anticoagulation. Her creatinine clearance was calculated to be 38 so this is the appropriate dosage. Because of her significantly elevated risk of having a CVA, I would recommend remaining on oral anticoagulation. She did not tolerate her beta blocker for rate control, so was initially switched to verapamil, however, the patient was having difficulty getting this filled, so she is now maintained on Cardizem CD. Her rates appear well controlled on this regimen. I would continue her current medical regimen. Hypertension. Patient's blood pressure losartan and appears well-controlled on a combination of diltiazem. Dyslipidemia. Patient is on simvastatin 10 mg daily. This is followed by her PCP. Dependent edema. The patient states she takes Lasix for this every other day. Chronic kidney disease, stage III. Her creatinine has been stable. This should be checked at least 2-3 times year because of her Pradaxa requirement. Anemia. This appears stable. Patient may be seen by the GI physician. Right carotid bruit. This is a new finding on physical exam today. I recommended a carotid duplex scan for further evaluation. Followup in 6 months, sooner if needed.

## 2017-10-22 NOTE — PROGRESS NOTES
Please let the pt know that one of her thyroid labs is normal and another is abnormal. She needs to follow up with the Endocrinology team as her methimazole dose rx may need to be adjusted. Meanwhile, she is anemic. Given her recent episode of rectal bleeding, she needs to follow up with the Gastroenterology team. The referral was placed at the time of her most recent apt. Meanwhile her kidney and liver function are normal. Her cholesterol measurements are roughly unchanged since last checked. She should continue taking her statin as prescribed.     Dr. Dawson Samples  [facility] [address].  Phone: [phone]  Fax: [phone]

## 2017-10-23 ENCOUNTER — TELEPHONE (OUTPATIENT)
Dept: INTERNAL MEDICINE CLINIC | Age: 82
End: 2017-10-23

## 2017-10-23 NOTE — TELEPHONE ENCOUNTER
Patient is already scheduled with [facility] that referral was faxed over already and patient is scheduled for November 1, 2017

## 2017-10-23 NOTE — TELEPHONE ENCOUNTER
----- Message from Jesus Milligan MD sent at 10/21/2017  8:13 PM EDT -----  Please let the pt know that one of her thyroid labs is normal and another is abnormal. She needs to follow up with the Endocrinology team as her methimazole dose rx may need to be adjusted. Meanwhile, she is anemic. Given her recent episode of rectal bleeding, she needs to follow up with the Gastroenterology team. The referral was placed at the time of her most recent apt. Meanwhile her kidney and liver function are normal. Her cholesterol measurements are roughly unchanged since last checked. She should continue taking her statin as prescribed.     Dr. Amy Welch  [facility], [address] [place] [address].  Phone: [phone]  Fax: [phone]

## 2017-10-23 NOTE — TELEPHONE ENCOUNTER
Called and spoke to patient about message below. Patient verbalized understanding. Patient stated that she hasnt seen Dr. Laurita Simon in 4 years and wants to know if she needs to get another referral for him.

## 2017-11-07 ENCOUNTER — HOSPITAL ENCOUNTER (OUTPATIENT)
Dept: VASCULAR SURGERY | Age: 82
Discharge: HOME | End: 2017-11-07
Attending: INTERNAL MEDICINE
Payer: MEDICARE

## 2017-11-07 DIAGNOSIS — R09.89 BRUIT: ICD-10-CM

## 2017-11-07 PROCEDURE — 93880 EXTRACRANIAL BILAT STUDY: CPT

## 2017-11-07 NOTE — PROCEDURES
[facility]  *** FINAL REPORT ***    Name: Sammie Wilkes  MRN: [id number]    Outpatient  : 1933  HIS Order #: [id number]  [id number] [facility] Visit #: [id number]  Date: 2017    TYPE OF TEST: Cerebrovascular Duplex    REASON FOR TEST    Right Carotid:-             Proximal               Mid                 Distal  cm/s  Systolic  Diastolic  Systolic  Diastolic  Systolic  Diastolic  CCA:     53.0      19.0       98.0      21.0       96.0      24.0  Bulb:  ECA:     70.0       0.0  ICA:     82.0      10.0       89.0      31.0       73.0      14.0  ICA/CCA:  0.9       1.5    ICA Stenosis: <50%    Right Vertebral:-  Finding: Antegrade  Sys:       62.0  Keli:       20.0    Right Subclavian: Normal    Left Carotid:-            Proximal                Mid                 Distal  cm/s  Systolic  Diastolic  Systolic  Diastolic  Systolic  Diastolic  CCA:     39.2      27.0       90.0      20.0       77.0      22.0  Bulb:  ECA:     73.0      12.0  ICA:     63.0      14.0       59.0      16.0       94.0      35.0  ICA/CCA:  1.0       1.3    ICA Stenosis: <50%    Left Vertebral:-  Finding: Antegrade  Sys:       77.0  Keli:       25.0    Left Subclavian: Normal    INTERPRETATION/FINDINGS  1. Bilateral <50% stenosis of the internal carotid arteries. 2. No significant stenosis in the external carotid arteries  bilaterally. 3. Antegrade flow in both vertebral arteries. 4. Normal flow in both subclavian arteries. Plaque Morphology:  1. Calcified plaque in the bulb and right ICA. 2. Calcified plaque in the bulb and left ICA. ADDITIONAL COMMENTS  Nodule noted in the right thyroid measuring 3.0 x 2.7 cm with  peripheral vascular flow. I have personally reviewed the data relevant to the interpretation of  this  study. TECHNOLOGIST: Abimael Roldan, [facility], RVT/  Signed: 2017 02:05 PM    PHYSICIAN: Yo Galvez.  Mino Medina MD  Signed: 2017 08:26 AM

## 2017-11-08 ENCOUNTER — TELEPHONE (OUTPATIENT)
Dept: CARDIOLOGY CLINIC | Age: 82
End: 2017-11-08

## 2017-11-08 NOTE — TELEPHONE ENCOUNTER
----- Message from Arjun Nagy MD sent at 11/8/2017  4:43 PM EST -----  Please let the patient know that she did not have any significant carotid disease on her duplex study  ----- Message -----     From: Dicie Kehr     Sent: 11/8/2017  10:29 AM       To: Arjun Nagy MD    Right carotid bruit. This is a new finding on physical exam today. I recommended a carotid duplex scan for further evaluation.

## 2017-11-08 NOTE — PROGRESS NOTES
Right carotid bruit. This is a new finding on physical exam today. I recommended a carotid duplex scan for further evaluation.

## 2017-11-09 NOTE — TELEPHONE ENCOUNTER
Called patient. Verified  and full name. Informed about results per Dr Ashley Dooley. Patient verbalized understanding and agreed with the plan of care.

## 2017-11-24 RX ORDER — DABIGATRAN ETEXILATE 150 MG/1
150 CAPSULE ORAL 2 TIMES DAILY
Qty: 180 CAP | Refills: 3 | Status: SHIPPED | OUTPATIENT
Start: 2017-11-24 | End: 2018-01-01 | Stop reason: ALTCHOICE

## 2017-11-24 NOTE — TELEPHONE ENCOUNTER
Spoke with patient, she gave verbal permission for  Sara Vallecillo to  Pradaxa script. Ok per Pedro Luis Caro, .

## 2017-12-07 ENCOUNTER — TELEPHONE (OUTPATIENT)
Dept: INTERNAL MEDICINE CLINIC | Age: 82
End: 2017-12-07

## 2017-12-07 NOTE — LETTER
12/7/2017 2:19 PM 
 
Ms. Trinidad [address] [address] Current Outpatient Prescriptions:  
  dabigatran etexilate (PRADAXA) 150 mg capsule, Take 1 Cap by mouth two (2) times a day., Disp: 180 Cap, Rfl: 3 
  BESIVANCE 0.6 % drps ophthalmic suspension, INSTILL 1 DROP OD Q HOUR WHILE AWAKE, Disp: , Rfl: 0 
  doxycycline (VIBRAMYCIN) 100 mg capsule, TK ONE C PO BID, Disp: , Rfl: 6 
  GENTAK 0.3 % (3 mg/gram) ophthalmic ointment, , Disp: , Rfl: 3 
  trimethoprim-polymyxin b (POLYTRIM) ophthalmic solution, INT 1 GTT OD QID, Disp: , Rfl: 6 
  tobramycin (TOBREX) 0.3 % ophthalmic solution, , Disp: , Rfl:  
  furosemide (LASIX) 40 mg tablet, TAKE 1 TABLET DAILY FOR EDEMA (Patient taking differently: TAKE 1 TABLET BY MOUTH EVERY OTHER DAY FOR EDEMA), Disp: 90 Tab, Rfl: 3 
  dilTIAZem CD (CARDIZEM CD) 180 mg ER capsule, TAKE 1 CAPSULE DAILY, Disp: 90 Cap, Rfl: 3 
  sertraline (ZOLOFT) 100 mg tablet, Take 1 Tab by mouth daily. , Disp: 90 Tab, Rfl: 3 
  celecoxib (CELEBREX) 200 mg capsule, Take 1 Cap by mouth daily as needed. , Disp: 90 Cap, Rfl: 3 
  REFRESH TEARS 0.5 % drop ophthalmic solution, Apply 1 Drop to eye two (2) times a day., Disp: , Rfl:  
  prednisoLONE acetate (PRED FORTE) 1 % ophthalmic suspension, Administer 1 Drop to right eye two (2) times a day., Disp: , Rfl:  
  VIGAMOX 0.5 % ophthalmic solution, Administer 1 Drop to right eye daily. , Disp: , Rfl:  
  NEXIUM 40 mg capsule, Take 1 Cap by mouth daily. , Disp: 90 Cap, Rfl: 3 
  simvastatin (ZOCOR) 10 mg tablet, Take 1 Tab by mouth nightly., Disp: 90 Tab, Rfl: 3 
  losartan (COZAAR) 25 mg tablet, TAKE 1 TABLET DAILY, Disp: 90 Tab, Rfl: 3 
  hydroxychloroquine (PLAQUENIL) 200 mg tablet, Take 200 mg by mouth two (2) times a day., Disp: , Rfl:  
  cycloSPORINE (RESTASIS) 0.05 % ophthalmic emulsion, Administer 1 Drop to both eyes two (2) times a day., Disp: , Rfl:  
   peg 400-propylene glycol (SYSTANE) 0.4-0.3 % Drop, Administer 1 Drop to right eye as needed. , Disp: , Rfl:  
  methimazole (TAPAZOLE) 5 mg tablet, Take 2.5 mg by mouth nightly., Disp: , Rfl:  
  cholecalciferol, vitamin d3, (VITAMIN D) 1,000 unit tablet, Take 1,000 Units by mouth daily. , Disp: , Rfl:  
  CALCIUM CITRATE (CALCITRATE PO), Take 1 Tab by mouth two (2) times a day., Disp: , Rfl:  
 
 
Sincerely, 
 
 
Chloé Hirsch MD

## 2017-12-18 RX ORDER — OMEPRAZOLE 40 MG/1
40 CAPSULE, DELAYED RELEASE ORAL DAILY
Qty: 90 CAP | Refills: 3 | Status: SHIPPED | COMMUNITY
Start: 2017-12-18 | End: 2018-01-01 | Stop reason: SDUPTHER

## 2017-12-18 NOTE — TELEPHONE ENCOUNTER
Patient needs a refill on Nexium but her insurance will not cover that. Wants to know if something else can be called in to [facility]. These are the medications her insurance will cover.   - Omeprazole  - Pantoprazole  - Rabeprazole

## 2017-12-18 NOTE — TELEPHONE ENCOUNTER
Omeprazole ordered in place of Nexium. Omeprazole is on formulary per pt's insurance.     Dr. Ciarra Monet  [facility] [address].  Phone: [phone]  Fax: [phone]

## 2018-01-01 ENCOUNTER — OFFICE VISIT (OUTPATIENT)
Dept: INTERNAL MEDICINE CLINIC | Age: 83
End: 2018-01-01

## 2018-01-01 VITALS
TEMPERATURE: 97.2 F | SYSTOLIC BLOOD PRESSURE: 124 MMHG | OXYGEN SATURATION: 94 % | DIASTOLIC BLOOD PRESSURE: 68 MMHG | HEART RATE: 88 BPM | WEIGHT: 149.2 LBS | RESPIRATION RATE: 12 BRPM | BODY MASS INDEX: 28.17 KG/M2 | HEIGHT: 61 IN

## 2018-01-01 DIAGNOSIS — E05.90 HYPERTHYROIDISM: ICD-10-CM

## 2018-01-01 DIAGNOSIS — I10 ESSENTIAL HYPERTENSION: ICD-10-CM

## 2018-01-01 DIAGNOSIS — H53.8 BLURRY VISION, LEFT EYE: ICD-10-CM

## 2018-01-01 DIAGNOSIS — I48.19 PERSISTENT ATRIAL FIBRILLATION (HCC): ICD-10-CM

## 2018-01-01 DIAGNOSIS — H61.22 HEARING LOSS OF LEFT EAR DUE TO CERUMEN IMPACTION: ICD-10-CM

## 2018-01-01 DIAGNOSIS — D64.9 ANEMIA, UNSPECIFIED TYPE: ICD-10-CM

## 2018-01-01 DIAGNOSIS — I67.1 CEREBRAL ANEURYSM: Primary | ICD-10-CM

## 2018-01-01 DIAGNOSIS — M06.9 RHEUMATOID ARTHRITIS INVOLVING MULTIPLE SITES, UNSPECIFIED RHEUMATOID FACTOR PRESENCE: ICD-10-CM

## 2018-01-01 LAB — CREATININE, EXTERNAL: 0.83

## 2018-01-01 RX ORDER — OMEPRAZOLE 40 MG/1
40 CAPSULE, DELAYED RELEASE ORAL DAILY
Qty: 90 CAP | Refills: 0 | Status: SHIPPED | OUTPATIENT
Start: 2018-01-01 | End: 2019-01-01 | Stop reason: SDUPTHER

## 2018-01-11 ENCOUNTER — OFFICE VISIT (OUTPATIENT)
Dept: INTERNAL MEDICINE CLINIC | Age: 83
End: 2018-01-11

## 2018-01-11 VITALS
DIASTOLIC BLOOD PRESSURE: 74 MMHG | BODY MASS INDEX: 25.64 KG/M2 | HEIGHT: 61 IN | OXYGEN SATURATION: 95 % | WEIGHT: 135.8 LBS | RESPIRATION RATE: 12 BRPM | SYSTOLIC BLOOD PRESSURE: 138 MMHG | HEART RATE: 93 BPM | TEMPERATURE: 97.1 F

## 2018-01-11 DIAGNOSIS — J18.9 PNEUMONIA OF RIGHT UPPER LOBE DUE TO INFECTIOUS ORGANISM: Primary | ICD-10-CM

## 2018-01-11 DIAGNOSIS — J98.01 BRONCHOSPASM: ICD-10-CM

## 2018-01-11 RX ORDER — AZITHROMYCIN 250 MG/1
TABLET, FILM COATED ORAL
Qty: 6 TAB | Refills: 0 | Status: SHIPPED | OUTPATIENT
Start: 2018-01-11 | End: 2018-01-11 | Stop reason: SDUPTHER

## 2018-01-11 RX ORDER — METHYLPREDNISOLONE 4 MG/1
TABLET ORAL
Qty: 1 DOSE PACK | Refills: 0 | Status: SHIPPED | OUTPATIENT
Start: 2018-01-11 | End: 2018-01-11 | Stop reason: SDUPTHER

## 2018-01-11 RX ORDER — AZITHROMYCIN 250 MG/1
TABLET, FILM COATED ORAL
Qty: 6 TAB | Refills: 0 | Status: SHIPPED | OUTPATIENT
Start: 2018-01-11 | End: 2018-02-22 | Stop reason: ALTCHOICE

## 2018-01-11 RX ORDER — METHYLPREDNISOLONE 4 MG/1
TABLET ORAL
Qty: 1 DOSE PACK | Refills: 0 | Status: SHIPPED | OUTPATIENT
Start: 2018-01-11 | End: 2018-02-22 | Stop reason: ALTCHOICE

## 2018-01-11 NOTE — PROGRESS NOTES
Chief Complaint   Patient presents with    Cold Symptoms     with Congestion for the past 3 weeks, coughing up White-Yellow  Phlegm    Nasal Discharge     white-yellow nasal discharge     Patient still has a copy of the Advanced Medical Directive form and understands to bring it in once completed. 1. Have you been to the ER, urgent care clinic since your last visit? Hospitalized since your last visit? No    2. Have you seen or consulted any other health care providers outside of the [address] [place] [address] since your last visit? Include any pap smears or colon screening.  No

## 2018-01-11 NOTE — ACP (ADVANCE CARE PLANNING)
Patient still has a copy of the Advanced Medical Directive form and understands to bring it in once completed.

## 2018-01-11 NOTE — MR AVS SNAPSHOT
Visit Information Date & Time Provider Department Dept. Phone Encounter #  
 1/11/2018 11:00 AM Nuha Newman MD [facility] [phone] [id number] Follow-up Instructions Return in about 6 weeks (around 2/22/2018), or if symptoms worsen or fail to improve, for pneumonia. Your Appointments 4/20/2018 11:40 AM  
Follow Up with Preeti Shafer MD  
[facility] ([facility]) Appt Note: 6 month f/up Naresh Nelson [phone]  
[phone] Elo Yuen [phone]  
  
    
 10/19/2018 11:40 AM  
Follow Up with Preeti Shafer MD  
[facility] ([facility]) Appt Note: 6 month f/up Naresh Nelson [phone]  
[phone] Upcoming Health Maintenance Date Due  
 MEDICARE YEARLY EXAM 2/8/2018 GLAUCOMA SCREENING Q2Y 10/19/2019 DTaP/Tdap/Td series (2 - Td) 5/27/2026 Allergies as of 1/11/2018  Review Complete On: 1/11/2018 By: Thierry Weinstein Severity Noted Reaction Type Reactions Etanercept High 10/21/2015    Other (comments) Caused bad infection behind knee 5/2015 Caused bad infection behind knee 5/2015 Caused bad infection behind knee 5/2015 Caused bad infection behind knee 5/2015 Morphine High 07/28/2014    Nausea and Vomiting, Other (comments) N&V 
N&V 
vomitting Haloperidol  01/03/2017    Other (comments) Hallucinations, agitation, increased BP Haloperidol Lactate  01/03/2017    Other (comments) Hallucinations, agitation, increased BP Hallucinations, agitation, increased BP Current Immunizations  Reviewed on 10/17/2016 Name Date Influenza High Dose Vaccine PF 10/16/2017 Influenza Vaccine 10/9/2014 Influenza Vaccine (Quad) PF 10/17/2016 Influenza Vaccine PF 10/9/2013 Influenza Vaccine Split 11/1/2010 Influenza Vaccine Whole 10/11/2012 Tdap 5/27/2016 ZZZ-RETIRED (DO NOT USE) Pneumococcal Vaccine (Unspecified Type) 8/9/2012, 11/21/1994 Not reviewed this visit You Were Diagnosed With   
  
 Codes Comments Bronchospasm    -  Primary ICD-10-CM: J98.01 
ICD-9-CM: 519.11 Pneumonia of right upper lobe due to infectious organism [facility])     ICD-10-CM: J18.1 ICD-9-CM: 866 Vitals BP Pulse Temp Resp Height(growth percentile) Weight(growth percentile) 138/74 (BP 1 Location: Right arm, BP Patient Position: Sitting) 93 97.1 °F (36.2 °C) (Oral) 12 5' 1\" (1.549 m) 135 lb 12.8 oz (61.6 kg) SpO2 BMI OB Status Smoking Status 95% 25.66 kg/m2 Hysterectomy Light Tobacco Smoker BMI and BSA Data Body Mass Index Body Surface Area  
 25.66 kg/m 2 1.63 m 2 Preferred Pharmacy Pharmacy Name Phone [phone] - [address], [address] AT [address] [phone] Your Updated Medication List  
  
   
This list is accurate as of: 1/11/18 11:56 AM.  Always use your most recent med list.  
  
  
  
  
 azithromycin 250 mg tablet Commonly known as:  Donivan Guile Take 500mg (2 tabs) on Day 1 and then 250mg (1 tab) daily on Days 2-5. BESIVANCE 0.6 % Drps ophthalmic suspension Generic drug:  besifloxacin INSTILL 1 DROP OD Q HOUR WHILE AWAKE  
  
 CALCITRATE PO Take 1 Tab by mouth two (2) times a day. celecoxib 200 mg capsule Commonly known as:  CeleBREX Take 1 Cap by mouth daily as needed. dabigatran etexilate 150 mg capsule Commonly known as:  PRADAXA Take 1 Cap by mouth two (2) times a day. dilTIAZem  mg ER capsule Commonly known as:  CARDIZEM CD  
TAKE 1 CAPSULE DAILY  
  
 furosemide 40 mg tablet Commonly known as:  LASIX TAKE 1 TABLET DAILY FOR EDEMA  
  
 GENTAK 0.3 % (3 mg/gram) ophthalmic ointment Generic drug:  gentamicin  
  
 losartan 25 mg tablet Commonly known as:  COZAAR  
TAKE 1 TABLET DAILY  
  
 methIMAzole 5 mg tablet Commonly known as:  TAPAZOLE Take 2.5 mg by mouth nightly. methylPREDNISolone 4 mg tablet Commonly known as:  Leighann Jose Carlos Take as directed by the package instructions. omeprazole 40 mg capsule Commonly known as:  PRILOSEC Take 1 Cap by mouth daily. PLAQUENIL 200 mg tablet Generic drug:  hydroxychloroquine Take 200 mg by mouth two (2) times a day. prednisoLONE acetate 1 % ophthalmic suspension Commonly known as:  PRED FORTE Administer 1 Drop to right eye two (2) times a day. REFRESH TEARS 0.5 % Drop ophthalmic solution Generic drug:  carboxymethylcellulose sodium Apply 1 Drop to eye two (2) times a day. RESTASIS 0.05 % ophthalmic emulsion Generic drug:  cycloSPORINE Administer 1 Drop to both eyes two (2) times a day. sertraline 100 mg tablet Commonly known as:  ZOLOFT Take 1 Tab by mouth daily. simvastatin 10 mg tablet Commonly known as:  ZOCOR Take 1 Tab by mouth nightly. SYSTANE (PROPYLENE GLYCOL) 0.4-0.3 % Drop Generic drug:  peg 400-propylene glycol Administer 1 Drop to right eye as needed. tobramycin 0.3 % ophthalmic solution Commonly known as:  TOBREX  
  
 VITAMIN D3 1,000 unit tablet Generic drug:  cholecalciferol Take 1,000 Units by mouth daily. Prescriptions Sent to Pharmacy Refills  
 methylPREDNISolone (MEDROL DOSEPACK) 4 mg tablet 0 Sig: Take as directed by the package instructions. Class: Normal  
 Pharmacy: [facility] [address] [place] [address] [address] Ph #: [phone]  
 azithromycin (ZITHROMAX) 250 mg tablet 0 Sig: Take 500mg (2 tabs) on Day 1 and then 250mg (1 tab) daily on Days 2-5. Class: Normal  
 Pharmacy: [facility] [address] [place] [address] AT [address] Ph #: [phone] Follow-up Instructions Return in about 6 weeks (around 2/22/2018), or if symptoms worsen or fail to improve, for pneumonia. To-Do List   
 Around 01/11/2018 Imaging:  XR CHEST AP LAT Patient Instructions Cough: Care Instructions Your Care Instructions A cough is your body's response to something that bothers your throat or airways. Many things can cause a cough. You might cough because of a cold or the flu, bronchitis, or asthma. Smoking, postnasal drip, allergies, and stomach acid that backs up into your throat also can cause coughs. A cough is a symptom, not a disease. Most coughs stop when the cause, such as a cold, goes away. You can take a few steps at home to cough less and feel better. Follow-up care is a key part of your treatment and safety. Be sure to make and go to all appointments, and call your doctor if you are having problems. It's also a good idea to know your test results and keep a list of the medicines you take. How can you care for yourself at home? · Drink lots of water and other fluids. This helps thin the mucus and soothes a dry or sore throat. Honey or lemon juice in hot water or tea may ease a dry cough. · Take cough medicine as directed by your doctor. · Prop up your head on pillows to help you breathe and ease a dry cough. · Try cough drops to soothe a dry or sore throat. Cough drops don't stop a cough. Medicine-flavored cough drops are no better than candy-flavored drops or hard candy. · Do not smoke. Avoid secondhand smoke. If you need help quitting, talk to your doctor about stop-smoking programs and medicines. These can increase your chances of quitting for good. When should you call for help? Call 911 anytime you think you may need emergency care. For example, call if: 
? · You have severe trouble breathing. ?Call your doctor now or seek immediate medical care if: 
? · You cough up blood. ? · You have new or worse trouble breathing. ? · You have a new or higher fever. ? · You have a new rash. ? Watch closely for changes in your health, and be sure to contact your doctor if: 
? · You cough more deeply or more often, especially if you notice more mucus or a change in the color of your mucus. ? · You have new symptoms, such as a sore throat, an earache, or sinus pain. ? · You do not get better as expected. Where can you learn more? Go to http://elmo-milly.info/. Enter D279 in the search box to learn more about \"Cough: Care Instructions. \" Current as of: May 12, 2017 Content Version: 11.4 © 3622-7207 [employer]. Care instructions adapted under license by [employer] (which disclaims liability or warranty for this information). If you have questions about a medical condition or this instruction, always ask your healthcare professional. [place] [address] any warranty or liability for your use of this information. Patient still has a copy of the Advanced Medical Directive form and understands to bring it in once completed. There are no preventive care reminders to display for this patient. Introducing hospitals & HEALTH SERVICES! [facility] introduces Adzuna patient portal. Now you can access parts of your medical record, email your doctor's office, and request medication refills online. 1. In your internet browser, go to [email or web address]://[email or web address] Mobile. Te[employer] People/Tequ[employer] Mobile 2. Click on the First Time User? Click Here link in the Sign In box. You will see the New Member Sign Up page. 3. Enter your Adzuna Access Code exactly as it appears below. You will not need to use this code after youve completed the sign-up process. If you do not sign up before the expiration date, you must request a new code. · Adzuna Access Code: [email or web address] Expires: 1/14/2018 12:29 PM 
 
4.  Enter the last four digits of your Social Security Number (xxxx) and Date of Birth (mm/dd/yyyy) as indicated and click Submit. You will be taken to the next sign-up page. 5. Create a Storie ID. This will be your Storie login ID and cannot be changed, so think of one that is secure and easy to remember. 6. Create a Storie password. You can change your password at any time. 7. Enter your Password Reset Question and Answer. This can be used at a later time if you forget your password. 8. Enter your e-mail address. You will receive e-mail notification when new information is available in [address] 9. Click Sign Up. You can now view and download portions of your medical record. 10. Click the Download Summary menu link to download a portable copy of your medical information. If you have questions, please visit the Frequently Asked Questions section of the Storie website. Remember, Storie is NOT to be used for urgent needs. For medical emergencies, dial 911. Now available from your iPhone and Android! Please provide this summary of care documentation to your next provider. Your primary care clinician is listed as Shira Priest. If you have any questions after today's visit, please call [phone].

## 2018-01-11 NOTE — PROGRESS NOTES
[facility]:  1/11/2018, MRN: [id number]      Lang Short is a 80 y.o. female and presents to clinic for Cold Symptoms (with Congestion for the past 3 weeks, coughing up White-Yellow  Phlegm) and Nasal Discharge (white-yellow nasal discharge)    Subjective:   Pt is an 81yo female with h/o iron deficiency, DJD, mitral valve disease, goiter, depression, AF, hyperthyroidism, HTN, rheumatoid arthritis, gastritis, MGUS, and osteopenia. Pneumonia: The patient has a 3 week history of a productive cough with white/yellow sputum, rhinorrhea, and congestion off and on. She reports no fever or chills. No sore throat, eye pain, or ear pain. No shortness of breath. No chest pain. She has not started smoking. She states that she has been exposed to other sick contacts in her family. \"Something is going around\" the family right now - \"cold\" wise - per pt hx. No alleviating factors are known. She has some associated wheezing. Patient Active Problem List    Diagnosis Date Noted    Anemia 10/17/2017    Iron deficiency anemia 04/01/2017    MGUS (monoclonal gammopathy of unknown significance) 04/01/2017    Osteoarthritis of right knee 01/29/2015    Mitral valve disorder 01/12/2015    Goiter, toxic, multinodular 03/30/2014    Depression 05/10/2012    Atrial fibrillation ([facility] [address] 02/08/2012    Long term current use of anticoagulant therapy 12/19/2011    Hyperthyroidism 12/14/2011    RA (rheumatoid arthritis) ([facility] [address] 07/21/2011    HTN (hypertension) 08/27/2010    Hypercholesterolemia 08/27/2010    Gastritis 08/27/2010    Osteopenia 08/27/2010       Current Outpatient Prescriptions   Medication Sig Dispense Refill    methylPREDNISolone (MEDROL DOSEPACK) 4 mg tablet Take as directed by the package instructions.  1 Dose Pack 0    azithromycin (ZITHROMAX) 250 mg tablet Take 500mg (2 tabs) on Day 1 and then 250mg (1 tab) daily on Days 2-5. 6 Tab 0    omeprazole (PRILOSEC) 40 mg capsule Take 1 Cap by mouth daily. 90 Cap 3    dabigatran etexilate (PRADAXA) 150 mg capsule Take 1 Cap by mouth two (2) times a day. 180 Cap 3    BESIVANCE 0.6 % drps ophthalmic suspension INSTILL 1 DROP OD Q HOUR WHILE AWAKE  0    GENTAK 0.3 % (3 mg/gram) ophthalmic ointment   3    tobramycin (TOBREX) 0.3 % ophthalmic solution       furosemide (LASIX) 40 mg tablet TAKE 1 TABLET DAILY FOR EDEMA (Patient taking differently: TAKE 1 TABLET BY MOUTH EVERY OTHER DAY FOR EDEMA) 90 Tab 3    dilTIAZem CD (CARDIZEM CD) 180 mg ER capsule TAKE 1 CAPSULE DAILY 90 Cap 3    sertraline (ZOLOFT) 100 mg tablet Take 1 Tab by mouth daily. 90 Tab 3    celecoxib (CELEBREX) 200 mg capsule Take 1 Cap by mouth daily as needed. 90 Cap 3    REFRESH TEARS 0.5 % drop ophthalmic solution Apply 1 Drop to eye two (2) times a day.  prednisoLONE acetate (PRED FORTE) 1 % ophthalmic suspension Administer 1 Drop to right eye two (2) times a day.  simvastatin (ZOCOR) 10 mg tablet Take 1 Tab by mouth nightly. 90 Tab 3    losartan (COZAAR) 25 mg tablet TAKE 1 TABLET DAILY 90 Tab 3    hydroxychloroquine (PLAQUENIL) 200 mg tablet Take 200 mg by mouth two (2) times a day.  cycloSPORINE (RESTASIS) 0.05 % ophthalmic emulsion Administer 1 Drop to both eyes two (2) times a day.  peg 400-propylene glycol (SYSTANE) 0.4-0.3 % Drop Administer 1 Drop to right eye as needed.  methimazole (TAPAZOLE) 5 mg tablet Take 2.5 mg by mouth nightly.  cholecalciferol, vitamin d3, (VITAMIN D) 1,000 unit tablet Take 1,000 Units by mouth daily.  CALCIUM CITRATE (CALCITRATE PO) Take 1 Tab by mouth two (2) times a day.          Allergies   Allergen Reactions    Etanercept Other (comments)     Caused bad infection behind knee 5/2015  Caused bad infection behind knee 5/2015  Caused bad infection behind knee 5/2015  Caused bad infection behind knee 5/2015    Morphine Nausea and Vomiting and Other (comments)     N&V  N&V  vomitting    Haloperidol Other (comments)     Hallucinations, agitation, increased BP    Haloperidol Lactate Other (comments)     Hallucinations, agitation, increased BP  Hallucinations, agitation, increased BP       Past Medical History:   Diagnosis Date    Acute gastric ulcer without mention of hemorrhage, perforation, or obstruction 1989    surg repair    Anemia     Arrhythmia     Atrial fibrillation ([facility] [address] 12/14/2011    Cataract 8-2001    RIGHT    Depression 4-    Edema leg RIGHT     NORMAL VENOUS DOPPLER    Goiter Nodular 7-30-01    History of echocardiogram 04/20/2015    EF 50-55%. No RWMA. Mild LVH. Indeterminate diastolic fx. RVSP 40 mmHg. Severe LAE. Mild JENNY. Mild MVP of anterior leaflet. Mod MR w/2 jets. Mod TR. No significant chg from study of 6/26/14.  History of Holter monitoring 01/09/2012    Well controlled atrial fibrillation. Avg HR 83 bpm.      Hypercholesterolemia     Hypertension     Hyperthyroidism 12/14/2011    Insomnia 6-18-09    Leg swelling     Lower extremity venous duplex 12/18/2013    Right leg:  No venous thrombosis.     Multinodular goiter     Nonspecific reaction to tuberculin skin test without active tuberculosis     Osteopenia 8/27/2010    Osteoporosis 2-2009    s/p reclast rx    Otitis media     Retina, separation 2004    detached retina    Rheumatoid arthritis(714.0)     rheumatoid    Septic arthritis [facility]) May 2015    R knee following with Ortho and ID    Skin cancer (melanoma) ([facility] [address] 03/2013    Symptomatic menopausal or female climacteric states     Thyroid disease        Past Surgical History:   Procedure Laterality Date    HX BLEPHAROPLASTY      HX HEENT      hx detached retina    HX HIP REPLACEMENT      HX KNEE ARTHROSCOPY      scheduled for replacement but could not do due to infection    HX MOHS PROCEDURES  9-2002    LEFT    HX ORTHOPAEDIC      right leg sx    HX SKIN BIOPSY  04/05/2013    malignant melanoma    HX HANNY AND BSO  1967    RI COLONOSCOPY FLX DX W/COLLJ SPEC WHEN PFRMD  8-2001    polyp(tubular adenoma); Dr Yahir Shelton  5-    (+)polyp (bx: tubular adenoma) Dr. Augusto Lechuga       Family History   Problem Relation Age of Onset    Hypertension Mother     Heart Disease Mother     Osteoporosis Mother     Alcohol abuse Father        Social History   Substance Use Topics    Smoking status: Light Tobacco Smoker     Years: 45.00     Types: Cigarettes     Last attempt to quit: 8/27/2003    Smokeless tobacco: Never Used    Alcohol use 0.0 oz/week     0 Standard drinks or equivalent per week      Comment: occasionally       ROS   Review of Systems   Constitutional: Negative for chills and fever. HENT: Positive for congestion. Negative for ear pain and sore throat. Eyes: Negative for blurred vision and pain. Respiratory: Positive for cough, sputum production and wheezing. Negative for shortness of breath. Cardiovascular: Negative for chest pain. Gastrointestinal: Negative for abdominal pain, blood in stool and melena. Genitourinary: Negative for dysuria and hematuria. Musculoskeletal: Positive for joint pain (off/on - chronic). Negative for myalgias. Skin: Negative for rash. Neurological: Negative for tingling, focal weakness and headaches. Endo/Heme/Allergies: Does not bruise/bleed easily. Psychiatric/Behavioral: Negative for substance abuse. Objective     Vitals:    01/11/18 1120   BP: 138/74   Pulse: 93   Resp: 12   Temp: 97.1 °F (36.2 °C)   TempSrc: Oral   SpO2: 95%   Weight: 135 lb 12.8 oz (61.6 kg)   Height: 5' 1\" (1.549 m)   PainSc:   2   PainLoc: Generalized       Physical Exam   Constitutional: She is oriented to person, place, and time and well-developed, well-nourished, and in no distress. HENT:   Head: Normocephalic and atraumatic. Nose: Nose normal.   Mouth/Throat: Oropharynx is clear and moist. No oropharyngeal exudate. +Cerumen in b/l external auditory canals.    Eyes: Right eye exhibits no discharge. Left eye exhibits no discharge. No scleral icterus. The right eye is less erythematous. Opaque pupil. She is presently being treated for conjunctivitis s/p corneal transplant and is scheduled to f/u with the Ophthalmology team.   Neck: Neck supple. Cardiovascular: Normal rate and intact distal pulses. Exam reveals no gallop and no friction rub. No murmur heard. Irregularly irregular HR   Pulmonary/Chest: Effort normal. No respiratory distress. She has wheezes ((R>L)). She has rales ((R>L)). Abdominal: Soft. Bowel sounds are normal. She exhibits no distension. There is no tenderness. There is no rebound and no guarding. Musculoskeletal: She exhibits no edema or tenderness (BUE). Lymphadenopathy:     She has no cervical adenopathy. Neurological: She is alert and oriented to person, place, and time. She exhibits normal muscle tone. Gait normal.   Skin: Skin is warm and dry. No erythema. Psychiatric: Affect normal.   Nursing note and vitals reviewed.       LABS   Data Review:   Lab Results   Component Value Date/Time    WBC 6.6 10/16/2017 02:56 PM    Hemoglobin, POC 10.9 05/20/2013 11:02 AM    HGB 8.4 10/16/2017 02:56 PM    Hematocrit, POC 32 05/20/2013 11:02 AM    HCT 28.3 10/16/2017 02:56 PM    PLATELET 877 87/90/3432 02:56 PM    MCV 95.6 10/16/2017 02:56 PM       Lab Results   Component Value Date/Time    Sodium 139 10/16/2017 02:56 PM    Potassium 5.2 10/16/2017 02:56 PM    Chloride 106 10/16/2017 02:56 PM    CO2 26 10/16/2017 02:56 PM    Anion gap 7 10/16/2017 02:56 PM    Glucose 95 10/16/2017 02:56 PM    BUN 23 10/16/2017 02:56 PM    Creatinine 1.13 10/16/2017 02:56 PM    BUN/Creatinine ratio 20 10/16/2017 02:56 PM    GFR est AA 56 10/16/2017 02:56 PM    GFR est non-AA 46 10/16/2017 02:56 PM    Calcium 8.8 10/16/2017 02:56 PM       Lab Results   Component Value Date/Time    Cholesterol, total 170 10/16/2017 02:56 PM    HDL Cholesterol 98 10/16/2017 02:56 PM LDL, calculated 54.8 10/16/2017 02:56 PM    VLDL, calculated 17.2 10/16/2017 02:56 PM    Triglyceride 86 10/16/2017 02:56 PM    CHOL/HDL Ratio 1.7 10/16/2017 02:56 PM       Assessment/Plan:   Bronchospasm and Pneumonia per PE findings. Ordering a chest x-ray. Medrol pack ordered. Azithromycin course prescribed. Patient was instructed to take probiotics while on antibiotics. I instructed her to notify me if she develops any diarrhea and or vaginal discharge. ORDERS:  - XR CHEST AP LAT; Future  - methylPREDNISolone (MEDROL DOSEPACK) 4 mg tablet; Take as directed by the package instructions. Dispense: 1 Dose Pack; Refill: 0  - azithromycin (ZITHROMAX) 250 mg tablet; Take 500mg (2 tabs) on Day 1 and then 250mg (1 tab) daily on Days 2-5. Dispense: 6 Tab; Refill: 0      There are no preventive care reminders to display for this patient. Lab review: labs are reviewed in the EHR    I have discussed the diagnosis with the patient and the intended plan as seen in the above orders. The patient has received an after-visit summary and questions were answered concerning future plans. I have discussed medication side effects and warnings with the patient as well. I have reviewed the plan of care with the patient, accepted their input and they are in agreement with the treatment goals. All questions were answered. The patient understands the plan of care. Handouts provided today with above information. Pt instructed if symptoms worsen to call the office or report to the ED for continued care. Greater than 50% of the visit time was spent in counseling and/or coordination of care. Follow-up Disposition:  Return in about 6 weeks (around 2/22/2018), or if symptoms worsen or fail to improve, for pneumonia.     Lei Aguilar MD

## 2018-01-12 ENCOUNTER — HOSPITAL ENCOUNTER (OUTPATIENT)
Dept: GENERAL RADIOLOGY | Age: 83
Discharge: HOME | End: 2018-01-12
Payer: MEDICARE

## 2018-01-12 DIAGNOSIS — J18.9 PNEUMONIA OF RIGHT UPPER LOBE DUE TO INFECTIOUS ORGANISM: ICD-10-CM

## 2018-01-12 DIAGNOSIS — J98.01 BRONCHOSPASM: ICD-10-CM

## 2018-01-12 PROCEDURE — 71046 X-RAY EXAM CHEST 2 VIEWS: CPT

## 2018-01-12 NOTE — PROGRESS NOTES
Please let her know that her CXR is normal. She should still complete all medications prescribed at her visit this wk.     Dr. Guille German  [facility] [address]  Phone: [phone]  Fax: [phone]

## 2018-02-22 ENCOUNTER — OFFICE VISIT (OUTPATIENT)
Dept: INTERNAL MEDICINE CLINIC | Age: 83
End: 2018-02-22

## 2018-02-22 VITALS
DIASTOLIC BLOOD PRESSURE: 67 MMHG | HEIGHT: 61 IN | OXYGEN SATURATION: 96 % | WEIGHT: 141.8 LBS | BODY MASS INDEX: 26.77 KG/M2 | HEART RATE: 65 BPM | TEMPERATURE: 95.8 F | SYSTOLIC BLOOD PRESSURE: 108 MMHG | RESPIRATION RATE: 12 BRPM

## 2018-02-22 DIAGNOSIS — Z00.00 MEDICARE ANNUAL WELLNESS VISIT, SUBSEQUENT: ICD-10-CM

## 2018-02-22 DIAGNOSIS — M06.9 RHEUMATOID ARTHRITIS INVOLVING MULTIPLE SITES, UNSPECIFIED RHEUMATOID FACTOR PRESENCE: ICD-10-CM

## 2018-02-22 DIAGNOSIS — E78.00 HYPERCHOLESTEROLEMIA: Primary | ICD-10-CM

## 2018-02-22 DIAGNOSIS — Z71.89 ADVANCE CARE PLANNING: ICD-10-CM

## 2018-02-22 DIAGNOSIS — M85.80 OSTEOPENIA, UNSPECIFIED LOCATION: ICD-10-CM

## 2018-02-22 DIAGNOSIS — J18.9 PNEUMONIA DUE TO INFECTIOUS ORGANISM, UNSPECIFIED LATERALITY, UNSPECIFIED PART OF LUNG: ICD-10-CM

## 2018-02-22 DIAGNOSIS — E05.90 HYPERTHYROIDISM: ICD-10-CM

## 2018-02-22 RX ORDER — ERGOCALCIFEROL 1.25 MG/1
50000 CAPSULE ORAL 2 TIMES WEEKLY
COMMUNITY
Start: 2018-02-07

## 2018-02-22 RX ORDER — SIMVASTATIN 10 MG/1
10 TABLET, FILM COATED ORAL
Qty: 90 TAB | Refills: 3 | Status: SHIPPED | COMMUNITY
Start: 2018-02-22 | End: 2018-03-09 | Stop reason: SDUPTHER

## 2018-02-22 RX ORDER — ASCORBIC ACID 500 MG
1000 TABLET ORAL DAILY
COMMUNITY
End: 2019-01-01 | Stop reason: ALTCHOICE

## 2018-02-22 NOTE — PROGRESS NOTES
INTERNISTS OF [facility]:  2/26/2018, MRN: [id number]      Tyron Denise is a 80 y.o. female and presents to clinic for Pneumonia (follow up) and Medication Refill (printed)    Subjective:   Pt is an 81yo female with h/o iron deficiency, DJD, mitral valve disease, goiter, depression, AF, hyperthyroidism, HTN, rheumatoid arthritis, right corneal transplant, gastritis, MGUS, and osteopenia. 1.  Rheumatoid arthritis: This is a chronic condition, present for at least 6 months. She is on Celebrex and Plaquenil. She reports no adverse effects of taking these medications. She has pain in her shoulders, wrists, and hands. She is scheduled to see her rheumatologist later this year. Pain is manageable. 2.  Hyperthyroidism: She is followed by the endocrinology team and had an appointment earlier this week with him. She is on methimazole. Her dose is being adjusted based off of recent lab results. The patient denies chest pain, palpitations, and diarrhea/constipation. 3.  Hyperlipidemia: This is a chronic condition, present for at least 6 months. She is on simvastatin and will need a refill on this medication. She reports no adverse effects on this medication. Her weight is 141 pounds. Wt Readings from Last 3 Encounters:   02/22/18 141 lb 12.8 oz (64.3 kg)   01/11/18 135 lb 12.8 oz (61.6 kg)   10/17/17 134 lb (60.8 kg)     4. Pneumonia: At her last appointment, she had a 3 week history of productive cough with white/yellow sputum. Based on physical exam findings, the patient was treated for presumed pneumonia with azithromycin course and a Medrol pack. She completed these medications without any adverse side effects. A chest x-ray was also obtained. Her chest x-ray did not show any evidence of pneumonia despite her concerning physical exam findings. Today, she is asymptomatic. 5. General: Her  was recently diagnosed with leukemia and this has been a stressor for the pt.       Patient Active Problem List    Diagnosis Date Noted    Anemia 10/17/2017    Iron deficiency anemia 04/01/2017    MGUS (monoclonal gammopathy of unknown significance) 04/01/2017    Osteoarthritis of right knee 01/29/2015    Mitral valve disorder 01/12/2015    Goiter, toxic, multinodular 03/30/2014    Depression 05/10/2012    Atrial fibrillation ([facility] 02/08/2012    Long term current use of anticoagulant therapy 12/19/2011    Hyperthyroidism 12/14/2011    RA (rheumatoid arthritis) ([facility] 07/21/2011    HTN (hypertension) 08/27/2010    Hypercholesterolemia 08/27/2010    Gastritis 08/27/2010    Osteopenia 08/27/2010       Current Outpatient Prescriptions   Medication Sig Dispense Refill    VITAMIN D2 50,000 unit capsule Take 50,000 Units by mouth two (2) times a week.  ascorbic acid, vitamin C, (VITAMIN C) 500 mg tablet Take 1,000 mg by mouth daily.  simvastatin (ZOCOR) 10 mg tablet Take 1 Tab by mouth nightly. 90 Tab 3    omeprazole (PRILOSEC) 40 mg capsule Take 1 Cap by mouth daily. 90 Cap 3    dabigatran etexilate (PRADAXA) 150 mg capsule Take 1 Cap by mouth two (2) times a day. 180 Cap 3    GENTAK 0.3 % (3 mg/gram) ophthalmic ointment   3    tobramycin (TOBREX) 0.3 % ophthalmic solution       furosemide (LASIX) 40 mg tablet TAKE 1 TABLET DAILY FOR EDEMA (Patient taking differently: TAKE 1 TABLET BY MOUTH EVERY OTHER DAY FOR EDEMA) 90 Tab 3    dilTIAZem CD (CARDIZEM CD) 180 mg ER capsule TAKE 1 CAPSULE DAILY 90 Cap 3    sertraline (ZOLOFT) 100 mg tablet Take 1 Tab by mouth daily. 90 Tab 3    celecoxib (CELEBREX) 200 mg capsule Take 1 Cap by mouth daily as needed. 90 Cap 3    REFRESH TEARS 0.5 % drop ophthalmic solution Apply 1 Drop to eye two (2) times a day.  prednisoLONE acetate (PRED FORTE) 1 % ophthalmic suspension Administer 1 Drop to right eye two (2) times a day.       losartan (COZAAR) 25 mg tablet TAKE 1 TABLET DAILY 90 Tab 3    hydroxychloroquine (PLAQUENIL) 200 mg tablet Take 200 mg by mouth two (2) times a day.  cycloSPORINE (RESTASIS) 0.05 % ophthalmic emulsion Administer 1 Drop to both eyes two (2) times a day.  peg 400-propylene glycol (SYSTANE) 0.4-0.3 % Drop Administer 1 Drop to right eye as needed.  methimazole (TAPAZOLE) 5 mg tablet Take 2.5 mg by mouth nightly.  CALCIUM CITRATE (CALCITRATE PO) Take 1 Tab by mouth daily. Allergies   Allergen Reactions    Etanercept Other (comments)     Caused bad infection behind knee 5/2015  Caused bad infection behind knee 5/2015  Caused bad infection behind knee 5/2015  Caused bad infection behind knee 5/2015    Morphine Nausea and Vomiting and Other (comments)     N&V  N&V  vomitting    Haloperidol Other (comments)     Hallucinations, agitation, increased BP    Haloperidol Lactate Other (comments)     Hallucinations, agitation, increased BP  Hallucinations, agitation, increased BP       Past Medical History:   Diagnosis Date    Acute gastric ulcer without mention of hemorrhage, perforation, or obstruction 1989    surg repair    Anemia     Arrhythmia     Atrial fibrillation ([facility] 12/14/2011    Cataract 8-2001    RIGHT    Depression 4-    Edema leg RIGHT     NORMAL VENOUS DOPPLER    Goiter Nodular 7-30-01    History of echocardiogram 04/20/2015    EF 50-55%. No RWMA. Mild LVH. Indeterminate diastolic fx. RVSP 40 mmHg. Severe LAE. Mild JENNY. Mild MVP of anterior leaflet. Mod MR w/2 jets. Mod TR. No significant chg from study of 6/26/14.  History of Holter monitoring 01/09/2012    Well controlled atrial fibrillation. Avg HR 83 bpm.      Hypercholesterolemia     Hypertension     Hyperthyroidism 12/14/2011    Insomnia 6-18-09    Leg swelling     Lower extremity venous duplex 12/18/2013    Right leg:  No venous thrombosis.     Multinodular goiter     Nonspecific reaction to tuberculin skin test without active tuberculosis     Osteopenia 8/27/2010    Osteoporosis 2-2009    s/p reclast rx    Otitis media     Retina, separation 2004    detached retina    Rheumatoid arthritis(714.0)     rheumatoid    Septic arthritis [facility]) May 2015    R knee following with Ortho and ID    Skin cancer (melanoma) ([facility] [address] 03/2013    Symptomatic menopausal or female climacteric states     Thyroid disease        Past Surgical History:   Procedure Laterality Date    HX BLEPHAROPLASTY      HX HEENT      hx detached retina    HX HIP REPLACEMENT      HX KNEE ARTHROSCOPY      scheduled for replacement but could not do due to infection    HX MOHS PROCEDURES  9-2002    LEFT    HX ORTHOPAEDIC      right leg sx    HX SKIN BIOPSY  04/05/2013    malignant melanoma    HX HANNY AND BSO  1967    [place] COLONOSCOPY FLX DX W/COLLJ SPEC WHEN PFRMD  8-2001    polyp(tubular adenoma); Dr Rachana Owusu  5-    (+)polyp (bx: tubular adenoma) Dr. Samantha Jalloh       Family History   Problem Relation Age of Onset    Hypertension Mother     Heart Disease Mother     Osteoporosis Mother     Alcohol abuse Father        Social History   Substance Use Topics    Smoking status: Light Tobacco Smoker     Years: 45.00     Types: Cigarettes     Last attempt to quit: 8/27/2003    Smokeless tobacco: Never Used    Alcohol use 0.0 oz/week     0 Standard drinks or equivalent per week      Comment: occasionally       ROS   Review of Systems   Constitutional: Negative for chills and fever. HENT: Negative for ear pain and sore throat. Eyes: Positive for blurred vision (right eye). Negative for pain. Respiratory: Negative for cough and shortness of breath. Cardiovascular: Negative for chest pain. Gastrointestinal: Negative for abdominal pain, blood in stool and melena. Genitourinary: Negative for dysuria and hematuria. Musculoskeletal: Positive for joint pain (shoulder jts, wrists, and hands). Negative for myalgias. Skin: Negative for rash.    Neurological: Negative for tingling, focal weakness and headaches. Endo/Heme/Allergies: Does not bruise/bleed easily. Psychiatric/Behavioral: Negative for substance abuse. Objective     Vitals:    02/22/18 1141   BP: 108/67   Pulse: 65   Resp: 12   Temp: 95.8 °F (35.4 °C)   TempSrc: Oral   SpO2: 96%   Weight: 141 lb 12.8 oz (64.3 kg)   Height: 5' 1\" (1.549 m)   PainSc:   6   PainLoc: Generalized       Physical Exam   Constitutional: She is oriented to person, place, and time and well-developed, well-nourished, and in no distress. HENT:   Head: Normocephalic and atraumatic. Right Ear: External ear normal.   Left Ear: External ear normal.   Nose: Nose normal.   Mouth/Throat: Oropharynx is clear and moist. No oropharyngeal exudate. Eyes: Right eye exhibits no discharge. Left eye exhibits no discharge. No scleral icterus. The right eye pupil is opaque in color. There is less erythema present. Neck: Neck supple. Cardiovascular: Normal rate, normal heart sounds and intact distal pulses. Exam reveals no gallop and no friction rub. No murmur heard. Irregularly irregular HR   Pulmonary/Chest: Effort normal and breath sounds normal. No respiratory distress. She has no wheezes. She has no rales. Abdominal: Soft. Bowel sounds are normal. She exhibits no distension. There is no tenderness. There is no rebound and no guarding. Musculoskeletal: She exhibits no edema or tenderness (BUEare NTTP except along her b/l shoulder jts, right wrists, and left MCP jts). Lymphadenopathy:     She has no cervical adenopathy. Neurological: She is alert and oriented to person, place, and time. She exhibits normal muscle tone. Gait normal.   Skin: Skin is warm and dry. No erythema. Psychiatric: Affect normal.   Nursing note and vitals reviewed.       LABS   Data Review:   Lab Results   Component Value Date/Time    WBC 6.6 10/16/2017 02:56 PM    Hemoglobin, POC 10.9 (L) 05/20/2013 11:02 AM    HGB 8.4 (L) 10/16/2017 02:56 PM    Hematocrit, POC 32 (L) 05/20/2013 11:02 AM    HCT 28.3 (L) 10/16/2017 02:56 PM    PLATELET 461 93/41/0306 02:56 PM    MCV 95.6 10/16/2017 02:56 PM       Lab Results   Component Value Date/Time    Sodium 139 10/16/2017 02:56 PM    Potassium 5.2 10/16/2017 02:56 PM    Chloride 106 10/16/2017 02:56 PM    CO2 26 10/16/2017 02:56 PM    Anion gap 7 10/16/2017 02:56 PM    Glucose 95 10/16/2017 02:56 PM    BUN 23 (H) 10/16/2017 02:56 PM    Creatinine 1.13 10/16/2017 02:56 PM    BUN/Creatinine ratio 20 10/16/2017 02:56 PM    GFR est AA 56 (L) 10/16/2017 02:56 PM    GFR est non-AA 46 (L) 10/16/2017 02:56 PM    Calcium 8.8 10/16/2017 02:56 PM       Lab Results   Component Value Date/Time    Cholesterol, total 170 10/16/2017 02:56 PM    HDL Cholesterol 98 (H) 10/16/2017 02:56 PM    LDL, calculated 54.8 10/16/2017 02:56 PM    VLDL, calculated 17.2 10/16/2017 02:56 PM    Triglyceride 86 10/16/2017 02:56 PM    CHOL/HDL Ratio 1.7 10/16/2017 02:56 PM     Assessment/Plan:   1. Hypercholesterolemia: Stable. Refilling her simvastatin. ORDERS:  - simvastatin (ZOCOR) 10 mg tablet; Take 1 Tab by mouth nightly. Dispense: 90 Tab; Refill: 3    2. Rheumatoid Arthritis:   Continue with plaquenil per Rheumatology team's recommendations. I am requesting records. 3.  Hyperthyroidism:   Continue with medication as prescribed per Endocrinology team's recommendations. Requesting records. 4.  Pneumonia: Status post antibiotics. Chest x-ray is reassuring. Physical exam is reassuring today. Observation. Health Maintenance Due   Topic Date Due    MEDICARE YEARLY EXAM  02/08/2018     Lab review: labs are reviewed in the EHR    I have discussed the diagnosis with the patient and the intended plan as seen in the above orders. The patient has received an after-visit summary and questions were answered concerning future plans. I have discussed medication side effects and warnings with the patient as well.  I have reviewed the plan of care with the patient, accepted their input and they are in agreement with the treatment goals. All questions were answered. The patient understands the plan of care. Handouts provided today with above information. Pt instructed if symptoms worsen to call the office or report to the ED for continued care. Greater than 50% of the visit time was spent in counseling and/or coordination of care. Follow-up Disposition:  Return in about 6 months (around 8/22/2018) for BP check.     Elida Whittaker MD

## 2018-02-22 NOTE — MR AVS SNAPSHOT
[facility] 
 
 
 [address], [place] [phone] Patient: Donah Severin MRN:  [id number] Visit Information Date & Time Provider Department Dept. Phone Encounter #  
 2/22/2018 11:30 AM Kranthi Mooney MD [facility] [address] [place] [address] [phone] [id number] Follow-up Instructions Return in about 6 months (around 8/22/2018) for BP check. Your Appointments 4/20/2018 11:40 AM  
Follow Up with Yolette Lee MD  
[facility] ([address] [place] [address]) Appt Note: 6 month f/up [place] [address] [address] [id number] [phone] [facility] [address]  
  
    
 8/22/2018 11:00 AM  
Office Visit with Kranthi Mooney MD  
[facility] [address] [place] [address]) Appt Note: 6 mos f/u  
 [address] [place] [id number] [address]  
  
   
 [address] [facility]  
  
    
 10/19/2018 11:40 AM  
Follow Up with Yolette Lee MD  
[facility] ([address] [place] [address]) Appt Note: 6 month f/up [place] [address] [address] [id number] [phone] Upcoming Health Maintenance Date Due  
 MEDICARE YEARLY EXAM 2/8/2018 GLAUCOMA SCREENING Q2Y 10/19/2019 DTaP/Tdap/Td series (2 - Td) 5/27/2026 Allergies as of 2/22/2018  Review Complete On: 2/22/2018 By: Kranthi Mooney MD  
  
 Severity Noted Reaction Type Reactions Etanercept High 10/21/2015    Other (comments) Caused bad infection behind knee 5/2015 Caused bad infection behind knee 5/2015 Caused bad infection behind knee 5/2015 Caused bad infection behind knee 5/2015 Morphine High 07/28/2014    Nausea and Vomiting, Other (comments) N&V 
N&V 
vomitting Haloperidol  01/03/2017    Other (comments) Hallucinations, agitation, increased BP Haloperidol Lactate  01/03/2017    Other (comments) Hallucinations, agitation, increased BP Hallucinations, agitation, increased BP Current Immunizations  Reviewed on 10/17/2016 Name Date Influenza High Dose Vaccine PF 10/16/2017 Influenza Vaccine 10/9/2014 Influenza Vaccine (Quad) PF 10/17/2016 Influenza Vaccine PF 10/9/2013 Influenza Vaccine Split 11/1/2010 Influenza Vaccine Whole 10/11/2012 Tdap 5/27/2016 ZZZ-RETIRED (DO NOT USE) Pneumococcal Vaccine (Unspecified Type) 8/9/2012, 11/21/1994 Not reviewed this visit You Were Diagnosed With   
  
 Codes Comments Hypercholesterolemia     ICD-10-CM: E78.00 ICD-9-CM: 272.0 Vitals BP Pulse Temp Resp Height(growth percentile) Weight(growth percentile) 108/67 (BP 1 Location: Left arm, BP Patient Position: Sitting) 65 95.8 °F (35.4 °C) (Oral) 12 5' 1\" (1.549 m) 141 lb 12.8 oz (64.3 kg) SpO2 BMI OB Status Smoking Status 96% 26.79 kg/m2 Hysterectomy Light Tobacco Smoker BMI and BSA Data Body Mass Index Body Surface Area  
 26.79 kg/m 2 1.66 m 2 Preferred Pharmacy Pharmacy Name Phone [address] [facility] [phone] Your Updated Medication List  
  
   
This list is accurate as of 2/22/18 12:29 PM.  Always use your most recent med list.  
  
  
  
  
 Eilleen Lown Take 1 Tab by mouth daily. celecoxib 200 mg capsule Commonly known as:  CeleBREX Take 1 Cap by mouth daily as needed. dabigatran etexilate 150 mg capsule Commonly known as:  PRADAXA Take 1 Cap by mouth two (2) times a day. dilTIAZem  mg ER capsule Commonly known as:  CARDIZEM CD  
TAKE 1 CAPSULE DAILY  
  
 furosemide 40 mg tablet Commonly known as:  LASIX TAKE 1 TABLET DAILY FOR EDEMA  
  
 GENTAK 0.3 % (3 mg/gram) ophthalmic ointment Generic drug:  gentamicin losartan 25 mg tablet Commonly known as:  COZAAR  
TAKE 1 TABLET DAILY  
  
 methIMAzole 5 mg tablet Commonly known as:  TAPAZOLE Take 2.5 mg by mouth nightly. omeprazole 40 mg capsule Commonly known as:  PRILOSEC Take 1 Cap by mouth daily. PLAQUENIL 200 mg tablet Generic drug:  hydroxychloroquine Take 200 mg by mouth two (2) times a day. prednisoLONE acetate 1 % ophthalmic suspension Commonly known as:  PRED FORTE Administer 1 Drop to right eye two (2) times a day. REFRESH TEARS 0.5 % Drop ophthalmic solution Generic drug:  carboxymethylcellulose sodium Apply 1 Drop to eye two (2) times a day. RESTASIS 0.05 % ophthalmic emulsion Generic drug:  cycloSPORINE Administer 1 Drop to both eyes two (2) times a day. sertraline 100 mg tablet Commonly known as:  ZOLOFT Take 1 Tab by mouth daily. simvastatin 10 mg tablet Commonly known as:  ZOCOR Take 1 Tab by mouth nightly. SYSTANE (PROPYLENE GLYCOL) 0.4-0.3 % Drop Generic drug:  peg 400-propylene glycol Administer 1 Drop to right eye as needed. tobramycin 0.3 % ophthalmic solution Commonly known as:  TOBREX  
  
 VITAMIN C 500 mg tablet Generic drug:  ascorbic acid (vitamin C) Take 1,000 mg by mouth daily. VITAMIN D2 50,000 unit capsule Generic drug:  ergocalciferol Take 50,000 Units by mouth two (2) times a week. Prescriptions Sent to Mail Order Refills  
 simvastatin (ZOCOR) 10 mg tablet 3 Sig: Take 1 Tab by mouth nightly. Class: Mail Order Pharmacy: [address], [address] Ph #: [phone] Route: Oral  
  
Follow-up Instructions Return in about 6 months (around 8/22/2018) for BP check. Patient Instructions Health Maintenance Due Topic Date Due  MEDICARE YEARLY EXAM  02/08/2018 Medicare Part B Preventive Services Limitations Recommendation Scheduled Bone Mass Measurement 
(age 72 & older, biennial) Requires diagnosis related to osteoporosis or estrogen deficiency. Biennial benefit unless patient has history of long-term glucocorticoid tx or baseline is needed because initial test was by other method This should be done at age 72 and again if on osteoporosis medication at 2-3 year intervals. Discussed today. Cardiovascular Screening Blood Tests (every 5 years) Total cholesterol, HDL, Triglycerides Order as a panel if possible We should check your cholesterol panel at least once every 5 years. Up to date Colorectal Cancer Screening 
-Fecal occult blood test (annual) -Flexible sigmoidoscopy (5y) 
-Screening colonoscopy (10y) -Barium Enema  Due per your Gastroenterologist's recommendations. Discussed today Counseling to Prevent Tobacco Use (up to 8 sessions per year) - Counseling greater than 3 and up to 10 minutes - Counseling greater than 10 minutes Patients must be asymptomatic of tobacco-related conditions to receive as preventive service Continue with smoking cessation Diabetes Screening Tests (at least every 3 years, Medicare covers annually or at 6-month intervals for prediabetic patients) Fasting blood sugar (FBS) or glucose tolerance test (GTT) Patient must be diagnosed with one of the following: 
-Hypertension, Dyslipidemia, obesity, previous impaired FBS or GTT 
Or any two of the following: overweight, FH of diabetes, age ? 72, history of gestational diabetes, birth of baby weighing more than 9 pounds Should be done yearly Up to date Diabetes Self-Management Training (DSMT) (no USPSTF recommendation) Requires referral by treating physician for patient with diabetes or renal disease. 10 hours of initial DSMT session of no less than 30 minutes each in a continuous 12-month period. 2 hours of follow-up DSMT in subsequent years. Not applicable Glaucoma Screening (no USPSTF recommendation) Diabetes mellitus, family history, , age 48 or over,  American, age 72 or over Continue with annual eye exams. Up to date Human Immunodeficiency Virus (HIV) Screening (annually for increased risk patients) HIV-1 and HIV-2 by EIA, BACILIO, rapid antibody test, or oral mucosa transudate Patient must be at increased risk for HIV infection per USPSTF guidelines or pregnant. Tests covered annually for patients at increased risk. Pregnant patients may receive up to 3 test during pregnancy. Not applicable Medical Nutrition Therapy (MNT) (for diabetes or renal disease not recommended schedule) Requires referral by treating physician for patient with diabetes or renal disease. Can be provided in same year as diabetes self-management training (DSMT), and CMS recommends medical nutrition therapy take place after DSMT. Up to 3 hours for initial year and 2 hours in subsequent years. Not applicable Shingles Vaccination A shingles vaccine is also recommended once in a lifetime after age 61 Vaccination recommended for shingles vaccination. Overdue Seasonal Influenza Vaccination (annually)  Continue with yearly \"flu\" shot annually Up to date Pneumococcal Vaccination (once after 65)  Please receive this vaccination at age 72. Up to date Hepatitis B Vaccinations (if medium/high risk) Medium/high risk factors:  End-stage renal disease, Hemophiliacs who received Factor VIII or IX concentrates, Clients of institutions for the mentally retarded, Persons who live in the same house as a HepB virus carrier, Homosexual men, Illicit injectable drug abusers. Not applicable Screening Mammography (biennial age 54-69) Annually (age 36 or over) You need a mammogram yearly to screen for breast cancer. Discussed today. Screening Pap Tests and Pelvic Examination (up to age 79 and after 79 if unknown history or abnormal study last 10 years) Every 24 months except high risk You need no Pap smear at this time. Not applicable Ultrasound Screening for Abdominal Aortic Aneurysm (AAA) (once) Patient must be referred through IPPE and not have had a screening for abdominal aortic aneurysm before under Medicare. Limited to patients who meet one of the following criteria: 
- Men who are 73-68 years old and have smoked more than 100 cigarettes in their lifetime. 
-Anyone with a FH of AAA 
-Anyone recommended for screening by USPSTF Not applicable High Cholesterol: Care Instructions Your Care Instructions Cholesterol is a type of fat in your blood. It is needed for many body functions, such as making new cells. Cholesterol is made by your body. It also comes from food you eat. High cholesterol means that you have too much of the fat in your blood. This raises your risk of a heart attack and stroke. LDL and HDL are part of your total cholesterol. LDL is the \"bad\" cholesterol. High LDL can raise your risk for heart disease, heart attack, and stroke. HDL is the \"good\" cholesterol. It helps clear bad cholesterol from the body. High HDL is linked with a lower risk of heart disease, heart attack, and stroke. Your cholesterol levels help your doctor find out your risk for having a heart attack or stroke. You and your doctor can talk about whether you need to lower your risk and what treatment is best for you. A heart-healthy lifestyle along with medicines can help lower your cholesterol and your risk. The way you choose to lower your risk will depend on how high your risk is for heart attack and stroke. It will also depend on how you feel about taking medicines. Follow-up care is a key part of your treatment and safety. Be sure to make and go to all appointments, and call your doctor if you are having problems. It's also a good idea to know your test results and keep a list of the medicines you take. How can you care for yourself at home? · Eat a variety of foods every day.  Good choices include fruits, vegetables, whole grains (like oatmeal), dried beans and peas, nuts and seeds, soy products (like tofu), and fat-free or low-fat dairy products. · Replace butter, margarine, and hydrogenated or partially hydrogenated oils with olive and canola oils. (Canola oil margarine without trans fat is fine.) · Replace red meat with fish, poultry, and soy protein (like tofu). · Limit processed and packaged foods like chips, crackers, and cookies. · Bake, broil, or steam foods. Don't crowell them. · Be physically active. Get at least 30 minutes of exercise on most days of the week. Walking is a good choice. You also may want to do other activities, such as running, swimming, cycling, or playing tennis or team sports. · Stay at a healthy weight or lose weight by making the changes in eating and physical activity listed above. Losing just a small amount of weight, even 5 to 10 pounds, can reduce your risk for having a heart attack or stroke. · Do not smoke. When should you call for help? Watch closely for changes in your health, and be sure to contact your doctor if: 
? · You need help making lifestyle changes. ? · You have questions about your medicine. Where can you learn more? Go to http://elmo-milly.info/. Enter T716 in the search box to learn more about \"High Cholesterol: Care Instructions. \" Current as of: September 21, 2016 Content Version: 11.4 © 4843-5178 [employer]. Care instructions adapted under license by [employer] (which disclaims liability or warranty for this information). If you have questions about a medical condition or this instruction, always ask your healthcare professional. [place] [address] any warranty or liability for your use of this information. Introducing [facility]!    
 Adolph Galeas introduces [employer] patient portal. Now you can access parts of your medical record, email your doctor's office, and request medication refills online. 1. In your internet browser, go to [email or web address] 2. Click on the First Time User? Click Here link in the Sign In box. You will see the New Member Sign Up page. 3. Enter your [employer] Access Code exactly as it appears below. You will not need to use this code after youve completed the sign-up process. If you do not sign up before the expiration date, you must request a new code. · [employer] Access Code: [email or web address] Expires: 5/23/2018 11:29 AM 
 
4. Enter the last four digits of your Social Security Number (xxxx) and Date of Birth (mm/dd/yyyy) as indicated and click Submit. You will be taken to the next sign-up page. 5. Create a [employer] ID. This will be your [employer] login ID and cannot be changed, so think of one that is secure and easy to remember. 6. Create a [employer] password. You can change your password at any time. 7. Enter your Password Reset Question and Answer. This can be used at a later time if you forget your password. 8. Enter your e-mail address. You will receive e-mail notification when new information is available in [address] 9. Click Sign Up. You can now view and download portions of your medical record. 10. Click the Download Summary menu link to download a portable copy of your medical information. If you have questions, please visit the Frequently Asked Questions section of the [employer] website. Remember, [employer] is NOT to be used for urgent needs. For medical emergencies, dial 911. Now available from your iPhone and Android! Please provide this summary of care documentation to your next provider. Your primary care clinician is listed as Fazal Roman. If you have any questions after today's visit, please call [phone].

## 2018-02-22 NOTE — PROGRESS NOTES
Chief Complaint   Patient presents with    Pneumonia     follow up    Medication Refill     printed     1. Have you been to the ER, urgent care clinic since your last visit? Hospitalized since your last visit? No    2. Have you seen or consulted any other health care providers outside of the [address] [place] [address] since your last visit? Include any pap smears or colon screening.  No

## 2018-02-22 NOTE — PATIENT INSTRUCTIONS
Health Maintenance Due   Topic Date Due    MEDICARE YEARLY EXAM  02/08/2018     Medicare Part B Preventive Services Limitations Recommendation Scheduled   Bone Mass Measurement  (age 72 & older, biennial) Requires diagnosis related to osteoporosis or estrogen deficiency. Biennial benefit unless patient has history of long-term glucocorticoid tx or baseline is needed because initial test was by other method This should be done at age 72 and again if on osteoporosis medication at 2-3 year intervals. Discussed today. Cardiovascular Screening Blood Tests (every 5 years)  Total cholesterol, HDL, Triglycerides Order as a panel if possible We should check your cholesterol panel at least once every 5 years. Up to date   Colorectal Cancer Screening  -Fecal occult blood test (annual)  -Flexible sigmoidoscopy (5y)  -Screening colonoscopy (10y)  -Barium Enema  Due per your Gastroenterologist's recommendations. Discussed today   Counseling to Prevent Tobacco Use (up to 8 sessions per year)  - Counseling greater than 3 and up to 10 minutes  - Counseling greater than 10 minutes Patients must be asymptomatic of tobacco-related conditions to receive as preventive service Continue with smoking cessation    Diabetes Screening Tests (at least every 3 years, Medicare covers annually or at 6-month intervals for prediabetic patients)    Fasting blood sugar (FBS) or glucose tolerance test (GTT) Patient must be diagnosed with one of the following:  -Hypertension, Dyslipidemia, obesity, previous impaired FBS or GTT  Or any two of the following: overweight, FH of diabetes, age ? 72, history of gestational diabetes, birth of baby weighing more than 9 pounds Should be done yearly Up to date   Diabetes Self-Management Training (DSMT) (no USPSTF recommendation) Requires referral by treating physician for patient with diabetes or renal disease. 10 hours of initial DSMT session of no less than 30 minutes each in a continuous 12-month period. 2 hours of follow-up DSMT in subsequent years. Not applicable    Glaucoma Screening (no USPSTF recommendation) Diabetes mellitus, family history, , age 48 or over,  American, age 72 or over Continue with annual eye exams. Up to date   Human Immunodeficiency Virus (HIV) Screening (annually for increased risk patients)  HIV-1 and HIV-2 by EIA, BACILIO, rapid antibody test, or oral mucosa transudate Patient must be at increased risk for HIV infection per USPSTF guidelines or pregnant. Tests covered annually for patients at increased risk. Pregnant patients may receive up to 3 test during pregnancy. Not applicable    Medical Nutrition Therapy (MNT) (for diabetes or renal disease not recommended schedule) Requires referral by treating physician for patient with diabetes or renal disease. Can be provided in same year as diabetes self-management training (DSMT), and CMS recommends medical nutrition therapy take place after DSMT. Up to 3 hours for initial year and 2 hours in subsequent years. Not applicable    Shingles Vaccination A shingles vaccine is also recommended once in a lifetime after age 61 Vaccination recommended for shingles vaccination. Overdue   Seasonal Influenza Vaccination (annually)  Continue with yearly \"flu\" shot annually Up to date   Pneumococcal Vaccination (once after 65)  Please receive this vaccination at age 72. Up to date   Hepatitis B Vaccinations (if medium/high risk) Medium/high risk factors:  End-stage renal disease,  Hemophiliacs who received Factor VIII or IX concentrates, Clients of institutions for the mentally retarded, Persons who live in the same house as a HepB virus carrier, Homosexual men, Illicit injectable drug abusers. Not applicable    Screening Mammography (biennial age 54-69) Annually (age 36 or over) You need a mammogram yearly to screen for breast cancer. Discussed today.     Screening Pap Tests and Pelvic Examination (up to age 79 and after 79 if unknown history or abnormal study last 10 years) Every 24 months except high risk You need no Pap smear at this time. Not applicable   Ultrasound Screening for Abdominal Aortic Aneurysm (AAA) (once) Patient must be referred through IPPE and not have had a screening for abdominal aortic aneurysm before under Medicare. Limited to patients who meet one of the following criteria:  - Men who are 73-68 years old and have smoked more than 100 cigarettes in their lifetime.  -Anyone with a FH of AAA  -Anyone recommended for screening by USPSTF Not applicable           High Cholesterol: Care Instructions  Your Care Instructions    Cholesterol is a type of fat in your blood. It is needed for many body functions, such as making new cells. Cholesterol is made by your body. It also comes from food you eat. High cholesterol means that you have too much of the fat in your blood. This raises your risk of a heart attack and stroke. LDL and HDL are part of your total cholesterol. LDL is the \"bad\" cholesterol. High LDL can raise your risk for heart disease, heart attack, and stroke. HDL is the \"good\" cholesterol. It helps clear bad cholesterol from the body. High HDL is linked with a lower risk of heart disease, heart attack, and stroke. Your cholesterol levels help your doctor find out your risk for having a heart attack or stroke. You and your doctor can talk about whether you need to lower your risk and what treatment is best for you. A heart-healthy lifestyle along with medicines can help lower your cholesterol and your risk. The way you choose to lower your risk will depend on how high your risk is for heart attack and stroke. It will also depend on how you feel about taking medicines. Follow-up care is a key part of your treatment and safety. Be sure to make and go to all appointments, and call your doctor if you are having problems.  It's also a good idea to know your test results and keep a list of the medicines you take.  How can you care for yourself at home? · Eat a variety of foods every day. Good choices include fruits, vegetables, whole grains (like oatmeal), dried beans and peas, nuts and seeds, soy products (like tofu), and fat-free or low-fat dairy products. · Replace butter, margarine, and hydrogenated or partially hydrogenated oils with olive and canola oils. (Canola oil margarine without trans fat is fine.)  · Replace red meat with fish, poultry, and soy protein (like tofu). · Limit processed and packaged foods like chips, crackers, and cookies. · Bake, broil, or steam foods. Don't crowell them. · Be physically active. Get at least 30 minutes of exercise on most days of the week. Walking is a good choice. You also may want to do other activities, such as running, swimming, cycling, or playing tennis or team sports. · Stay at a healthy weight or lose weight by making the changes in eating and physical activity listed above. Losing just a small amount of weight, even 5 to 10 pounds, can reduce your risk for having a heart attack or stroke. · Do not smoke. When should you call for help? Watch closely for changes in your health, and be sure to contact your doctor if:  ? · You need help making lifestyle changes. ? · You have questions about your medicine. Where can you learn more? Go to http://elmo-milly.info/. Enter F554 in the search box to learn more about \"High Cholesterol: Care Instructions. \"  Current as of: September 21, 2016  Content Version: 11.4  © 8493-5225 [employer]. Care instructions adapted under license by [employer] (which disclaims liability or warranty for this information). If you have questions about a medical condition or this instruction, always ask your healthcare professional. [place] [address] any warranty or liability for your use of this information.       Medicare Wellness Visit, Female    The best way to live healthy is to have a healthy lifestyle by eating a well-balanced diet, exercising regularly, limiting alcohol and stopping smoking. Regular physical exams and screening tests are another way to keep healthy. Preventive exams provided by your health care provider can find health problems before they become diseases or illnesses. Preventive services including immunizations, screening tests, monitoring and exams can help you take care of your own health. All people over age 72 should have a pneumovax  and and a prevnar shot to prevent pneumonia. These are once in a lifetime unless you and your provider decide differently. All people over 65 should have a yearly flu shot and a tetanus vaccine every 10 years. A bone mass density to screen for osteoporosis or thinning of the bones should be done every 2 years after 65. Screening for diabetes mellitus with a blood sugar test should be done every year. Glaucoma is a disease of the eye due to increased ocular pressure that can lead to blindness and it should be done every year by an eye professional.    Cardiovascular screening tests that check for elevated lipids (fatty part of blood) which can lead to heart disease and strokes should be done every 5 years. Colorectal screening that evaluates for blood or polyps in your colon should be done yearly as a stool test or every five years as a flexible sigmoidoscope or every 10 years as a colonoscopy up to age 76. Breast cancer screening with a mammogram is recommended biennially  for women age 54-69. Screening for cervical cancer with a pap smear and pelvic exam is recommended for women after age 72 years every 2 years up to age 79 or when the provider and patient decide to stop. If there is a history of cervical abnormalities or other increased risk for cancer then the test is recommended yearly. Hepatitis C screening is also recommended for anyone born between [birth year] through [address].     A shingles vaccine is also recommended once in a lifetime after age 61. Your Medicare Wellness Exam is recommended annually.     Here is a list of your current Health Maintenance items with a due date:  Health Maintenance Due   Topic Date Due    Annual Well Visit  02/08/2018

## 2018-02-26 NOTE — ACP (ADVANCE CARE PLANNING)
Advance Care Planning  Advance Care Planning (ACP) Provider Conversation     Date of ACP Conversation: 2/22/18  Persons included in Conversation:  patient    Authorized Decision Maker (if patient is incapable of making informed decisions): This person is:   Healthcare Agent/Medical Power of  under Advance Directive          For Patients with Decision Making Capacity:   Values/Goals: Exploration of values, goals, and preferences if recovery is not expected, even with continued medical treatment in the event of:  Imminent death  Severe, permanent brain injury    Conversation Outcomes / Follow-Up Plan:   Recommended completion of Advance Directive form after review of ACP materials and conversation with prospective healthcare agent . The patient states that she has a will in place but we do not have records of the advance directive portion of her will. She was given a blank advanced directive medical form packet to complete. I encouraged her to complete its that he can be scanned into the EHR. All questions were answered.     Dr. Kiera Nguyen  [facility] [place] [address] [facility] [address]  Phone: [phone]  Fax: [phone]

## 2018-02-26 NOTE — PROGRESS NOTES
INTERNISTS OF [facility]:  02/26/18, [id number]      The Subsequent Medicare Annual Wellness Exam PROGRESS NOTE    This is a Subsequent Medicare Annual Wellness Exam (AWV). I have reviewed the patient's medical history in detail and updated the computerized patient record. Glenn Nieto is a 80 y.o.  female and presents for an annual wellness exam.    SUBJECTIVE    Past Medical History:   Diagnosis Date    Acute gastric ulcer without mention of hemorrhage, perforation, or obstruction 1989    surg repair    Anemia     Arrhythmia     Atrial fibrillation ([facility] [address] 12/14/2011    Cataract 8-2001    RIGHT    Depression 4-    Edema leg RIGHT     NORMAL VENOUS DOPPLER    Goiter Nodular 7-30-01    History of echocardiogram 04/20/2015    EF 50-55%. No RWMA. Mild LVH. Indeterminate diastolic fx. RVSP 40 mmHg. Severe LAE. Mild JENNY. Mild MVP of anterior leaflet. Mod MR w/2 jets. Mod TR. No significant chg from study of 6/26/14.  History of Holter monitoring 01/09/2012    Well controlled atrial fibrillation. Avg HR 83 bpm.      Hypercholesterolemia     Hypertension     Hyperthyroidism 12/14/2011    Insomnia 6-18-09    Leg swelling     Lower extremity venous duplex 12/18/2013    Right leg:  No venous thrombosis.     Multinodular goiter     Nonspecific reaction to tuberculin skin test without active tuberculosis     Osteopenia 8/27/2010    Osteoporosis 2-2009    s/p reclast rx    Otitis media     Retina, separation 2004    detached retina    Rheumatoid arthritis(714.0)     rheumatoid    Septic arthritis [facility]) May 2015    R knee following with Ortho and ID    Skin cancer (melanoma) ([facility] [address] 03/2013    Symptomatic menopausal or female climacteric states     Thyroid disease       Past Surgical History:   Procedure Laterality Date    HX BLEPHAROPLASTY      HX HEENT      hx detached retina    HX HIP REPLACEMENT      HX KNEE ARTHROSCOPY      scheduled for replacement but could not do due to infection    HX MOHS PROCEDURES  9-2002    LEFT    HX ORTHOPAEDIC      right leg sx    HX SKIN BIOPSY  04/05/2013    malignant melanoma    HX HANNY AND BSO  1967    [place] COLONOSCOPY FLX DX W/COLLJ SPEC WHEN PFRMD  8-2001    polyp(tubular adenoma); Dr Thi Carroll  5-    (+)polyp (bx: tubular adenoma) Dr. Donis Vazquez     Current Outpatient Prescriptions   Medication Sig Dispense Refill    VITAMIN D2 50,000 unit capsule Take 50,000 Units by mouth two (2) times a week.  ascorbic acid, vitamin C, (VITAMIN C) 500 mg tablet Take 1,000 mg by mouth daily.  simvastatin (ZOCOR) 10 mg tablet Take 1 Tab by mouth nightly. 90 Tab 3    omeprazole (PRILOSEC) 40 mg capsule Take 1 Cap by mouth daily. 90 Cap 3    dabigatran etexilate (PRADAXA) 150 mg capsule Take 1 Cap by mouth two (2) times a day. 180 Cap 3    GENTAK 0.3 % (3 mg/gram) ophthalmic ointment   3    tobramycin (TOBREX) 0.3 % ophthalmic solution       furosemide (LASIX) 40 mg tablet TAKE 1 TABLET DAILY FOR EDEMA (Patient taking differently: TAKE 1 TABLET BY MOUTH EVERY OTHER DAY FOR EDEMA) 90 Tab 3    dilTIAZem CD (CARDIZEM CD) 180 mg ER capsule TAKE 1 CAPSULE DAILY 90 Cap 3    sertraline (ZOLOFT) 100 mg tablet Take 1 Tab by mouth daily. 90 Tab 3    celecoxib (CELEBREX) 200 mg capsule Take 1 Cap by mouth daily as needed. 90 Cap 3    REFRESH TEARS 0.5 % drop ophthalmic solution Apply 1 Drop to eye two (2) times a day.  prednisoLONE acetate (PRED FORTE) 1 % ophthalmic suspension Administer 1 Drop to right eye two (2) times a day.  losartan (COZAAR) 25 mg tablet TAKE 1 TABLET DAILY 90 Tab 3    hydroxychloroquine (PLAQUENIL) 200 mg tablet Take 200 mg by mouth two (2) times a day.  cycloSPORINE (RESTASIS) 0.05 % ophthalmic emulsion Administer 1 Drop to both eyes two (2) times a day.  peg 400-propylene glycol (SYSTANE) 0.4-0.3 % Drop Administer 1 Drop to right eye as needed.       methimazole (TAPAZOLE) 5 mg tablet Take 2.5 mg by mouth nightly.  CALCIUM CITRATE (CALCITRATE PO) Take 1 Tab by mouth daily. Allergies   Allergen Reactions    Etanercept Other (comments)     Caused bad infection behind knee 5/2015  Caused bad infection behind knee 5/2015  Caused bad infection behind knee 5/2015  Caused bad infection behind knee 5/2015    Morphine Nausea and Vomiting and Other (comments)     N&V  N&V  vomitting    Haloperidol Other (comments)     Hallucinations, agitation, increased BP    Haloperidol Lactate Other (comments)     Hallucinations, agitation, increased BP  Hallucinations, agitation, increased BP     Family History   Problem Relation Age of Onset    Hypertension Mother     Heart Disease Mother     Osteoporosis Mother     Alcohol abuse Father      Social History   Substance Use Topics    Smoking status: Light Tobacco Smoker     Years: 45.00     Types: Cigarettes     Last attempt to quit: 8/27/2003    Smokeless tobacco: Never Used    Alcohol use 0.0 oz/week     0 Standard drinks or equivalent per week      Comment: occasionally     Patient Active Problem List   Diagnosis Code    HTN (hypertension) I10    Hypercholesterolemia E78.00    Gastritis K29.70    Osteopenia M85.80    RA (rheumatoid arthritis) ([facility] [address] M06.9    Hyperthyroidism E05.90    Long term current use of anticoagulant therapy Z79.01    Atrial fibrillation ([facility] [address] I48.91    Depression F32.9    Goiter, toxic, multinodular E05.20    Mitral valve disorder I05.9    Osteoarthritis of right knee M17.11    Iron deficiency anemia D50.9    MGUS (monoclonal gammopathy of unknown significance) D47.2    Anemia D64.9       Pt is an 81yo female with h/o iron deficiency, DJD, mitral valve disease, goiter, depression, AF, hyperthyroidism, HTN, rheumatoid arthritis, gastritis, MGUS, and osteopenia. Health Maintenance History  Immunizations reviewed:    Tdap up to date   Pneumovax: up to date   Flu: up to date  Zoster: over-due    Immunization History   Administered Date(s) Administered    Influenza High Dose Vaccine PF 10/16/2017    Influenza Vaccine 10/09/2014    Influenza Vaccine (Quad) PF 10/17/2016    Influenza Vaccine PF 10/09/2013    Influenza Vaccine Split 11/01/2010    Influenza Vaccine Whole 10/11/2012    Tdap 05/27/2016    ZZZ-RETIRED (DO NOT USE) Pneumococcal Vaccine (Unspecified Type) 11/21/1994, 08/09/2012     Health Maintenance Due   Topic Date Due    MEDICARE YEARLY EXAM  02/08/2018     Colonoscopy: Discussed today. No hematochezia/melena     Eye exam: Up to date. We do not have records of her most recent formal eye exam    Mammo: Discussed today. No breast pain/masses    Dexascan: Up to date. Pelvic/Pap: No vaginal bleeding. Review of Systems   Constitutional: Negative for chills and fever. HENT: Negative for ear pain and sore throat. Eyes: Positive for blurred vision (right eye). Negative for pain. Respiratory: Negative for cough and shortness of breath. Cardiovascular: Negative for chest pain. Gastrointestinal: Negative for abdominal pain, blood in stool and melena. Genitourinary: Negative for dysuria and hematuria. Musculoskeletal: Positive for joint pain (chronic). Negative for myalgias. Skin: Negative for rash. Neurological: Negative for tingling, focal weakness and headaches. Endo/Heme/Allergies: Does not bruise/bleed easily. Psychiatric/Behavioral: Negative for substance abuse. Depression Risk Factor Screening:    Patient Health Questionnaire (PHQ-2)   Over the last 2 weeks, how often have you been bothered by any of the following problems? · Little interest or pleasure in doing things? · Not at all. [0]  · Feeling down, depressed, or hopeless? · More than half the days. [2]    Total Score: 2/6  PHQ-2 Assessment Scoring:   A score of 2 or more requires further screening with the PHQ-9    Alcohol Risk Factor Screening:   Women:    On any occasion during the past 3 months, have you had more than 3 drinks containing alcohol? no    Do you average more than 7 drinks per week? no    Tobacco Use Screening:     History   Smoking Status    Light Tobacco Smoker    Years: 45.00    Types: Cigarettes    Last attempt to quit: 8/27/2003   Smokeless Tobacco    Never Used       Hearing Loss   No hearing changes. Activities of Daily Living   Self-care. She does not need assistance with ADLs. She no longer drives and has some difficulties with IADLs given poor vision and limited mobility. Requires assistance with: no ADLs    Fall Risk   No falls within the past year per her hx    Abuse Screen   Patient is not abused  None    Additional Examination Findings:  Vitals:    02/22/18 1141   BP: 108/67   Pulse: 65   Resp: 12   Temp: 95.8 °F (35.4 °C)   TempSrc: Oral   SpO2: 96%   Weight: 141 lb 12.8 oz (64.3 kg)   Height: 5' 1\" (1.549 m)   PainSc:   6   PainLoc: Generalized      Body mass index is 26.79 kg/(m^2). Evaluation of Cognitive Function:  Mood/affect: Euthymic  Appearance: Well-groomed  Family member/caregiver input: She is accompanied by family members      General:   Well-nourished, well-groomed, pleasant, alert, in no acute distress. Head:  Normocephalic, atraumatic  Ears:  External ears WNL  Eyes:  Right pupil is opaque. Left eye is unremarkable. Neuro:   Alert, conversant, appropriate, following commands, no sensory deficit   Skin:    No rashes noted  Psych:  Affect, mood and judgment appropriate      Dementia Screen (Mini-Cog): Three Item Recall: 2/3  Clock Drawing (ten past eleven) Exercise: unremarkable.        LABS   Data Review:   Lab Results   Component Value Date/Time    Sodium 139 10/16/2017 02:56 PM    Potassium 5.2 10/16/2017 02:56 PM    Chloride 106 10/16/2017 02:56 PM    CO2 26 10/16/2017 02:56 PM    Anion gap 7 10/16/2017 02:56 PM    Glucose 95 10/16/2017 02:56 PM    BUN 23 (H) 10/16/2017 02:56 PM    Creatinine 1.13 10/16/2017 02:56 PM    BUN/Creatinine ratio 20 10/16/2017 02:56 PM    GFR est AA 56 (L) 10/16/2017 02:56 PM    GFR est non-AA 46 (L) 10/16/2017 02:56 PM    Calcium 8.8 10/16/2017 02:56 PM       Lab Results   Component Value Date/Time    WBC 6.6 10/16/2017 02:56 PM    Hemoglobin, POC 10.9 (L) 05/20/2013 11:02 AM    HGB 8.4 (L) 10/16/2017 02:56 PM    Hematocrit, POC 32 (L) 05/20/2013 11:02 AM    HCT 28.3 (L) 10/16/2017 02:56 PM    PLATELET 631 61/01/3806 02:56 PM    MCV 95.6 10/16/2017 02:56 PM     Lab Results   Component Value Date/Time    Cholesterol, total 170 10/16/2017 02:56 PM    HDL Cholesterol 98 (H) 10/16/2017 02:56 PM    LDL, calculated 54.8 10/16/2017 02:56 PM    VLDL, calculated 17.2 10/16/2017 02:56 PM    Triglyceride 86 10/16/2017 02:56 PM    CHOL/HDL Ratio 1.7 10/16/2017 02:56 PM       Patient Care Team:  Anoop Harmon MD as PCP - General (Family Practice)  Juanita Ireland MD (Cardiology)  Karina Elias RN as Nurse Navigator (Internal Medicine)  Mayela Walker DO as Physician (Orthopedic Surgery)  Denzel Groves MD (Gastroenterology)  Jameel Dugan MD as Hospitalist (Infectious Diseases)  Marylynn Simmonds, MD (Dermatology)  Keiry Barakat MD (Ophthalmology)  Donavan Olson MD (Oncology)  Tyler Tony (Endocrinology)  Brittany Felix MD as Consulting Provider (Rheumatology)  Aubrey Fisher DO as Consulting Provider (Hematology and Oncology)    End-of-life planning  Advanced Directive in the case than an injury or illness causes the patient to be unable to make health care decisions was discussed with the patient.      Advice/Referrals/Counselling/Plan:   Education and counseling provided:  Are appropriate based on today's review and evaluation  End-of-Life planning (with patient's consent)  Pneumococcal Vaccine  Influenza Vaccine  Screening Mammography  Screening Pap and pelvic (covered once every 2 years)  Colorectal cancer screening tests  Cardiovascular screening blood test  Bone mass measurement (DEXA)  Screening for glaucoma  Diabetes screening test     Include in education list (weight loss, physical activity, smoking cessation, fall prevention, and nutrition)    ICD-10-CM ICD-9-CM    1. Hypercholesterolemia E78.00 272.0 simvastatin (ZOCOR) 10 mg tablet     reviewed diet, exercise and weight control. Brief written plan, checklist    Assessment/Plan:    Health Maintenance:  I encourage the patient to get all recommended health screenings and vaccines. We discussed the importance of completing her advanced directive. A form was given to her today. Requesting her last formal eye exam.      Lab review: labs are reviewed in the [address] [place] [address] (ACP) Provider Conversation     Date of ACP Conversation: 2/22/18  Persons included in Conversation:  patient    Authorized Decision Maker (if patient is incapable of making informed decisions): This person is:   Healthcare Agent/Medical Power of  under Advance Directive          For Patients with Decision Making Capacity:   Values/Goals: Exploration of values, goals, and preferences if recovery is not expected, even with continued medical treatment in the event of:  Imminent death  Severe, permanent brain injury    Conversation Outcomes / Follow-Up Plan:   Recommended completion of Advance Directive form after review of ACP materials and conversation with prospective healthcare agent . The patient states that she has a will in place but we do not have records of the advance directive portion of her will. She was given a blank advanced directive medical form packet to complete. I encouraged her to complete its that he can be scanned into the EHR. All questions were answered. I have discussed the diagnosis with the patient and the intended plan as seen in the above orders. The patient has received an after-visit summary and questions were answered concerning future plans.   I have discussed medication side effects and warnings with the patient as well. I have reviewed the plan of care with the patient, accepted their input and they are in agreement with the treatment goals. Follow-up Disposition:  Return in about 6 months (around 8/22/2018) for BP check.

## 2018-02-28 ENCOUNTER — ANESTHESIA EVENT (OUTPATIENT)
Dept: ENDOSCOPY | Age: 83
End: 2018-02-28
Payer: MEDICARE

## 2018-03-01 ENCOUNTER — ANESTHESIA (OUTPATIENT)
Dept: ENDOSCOPY | Age: 83
End: 2018-03-01
Payer: MEDICARE

## 2018-03-01 ENCOUNTER — HOSPITAL ENCOUNTER (OUTPATIENT)
Age: 83
Setting detail: OUTPATIENT SURGERY
Discharge: HOME | End: 2018-03-01
Attending: INTERNAL MEDICINE | Admitting: INTERNAL MEDICINE
Payer: MEDICARE

## 2018-03-01 VITALS
RESPIRATION RATE: 18 BRPM | WEIGHT: 140 LBS | OXYGEN SATURATION: 100 % | HEART RATE: 77 BPM | SYSTOLIC BLOOD PRESSURE: 123 MMHG | DIASTOLIC BLOOD PRESSURE: 61 MMHG | BODY MASS INDEX: 26.43 KG/M2 | TEMPERATURE: 97.7 F | HEIGHT: 61 IN

## 2018-03-01 PROCEDURE — 74011000250 HC RX REV CODE- 250

## 2018-03-01 PROCEDURE — 74011250636 HC RX REV CODE- 250/636: Performed by: NURSE ANESTHETIST, CERTIFIED REGISTERED

## 2018-03-01 PROCEDURE — 74011000250 HC RX REV CODE- 250: Performed by: NURSE ANESTHETIST, CERTIFIED REGISTERED

## 2018-03-01 PROCEDURE — 76040000019: Performed by: INTERNAL MEDICINE

## 2018-03-01 PROCEDURE — 74011250636 HC RX REV CODE- 250/636

## 2018-03-01 PROCEDURE — 76060000031 HC ANESTHESIA FIRST 0.5 HR: Performed by: INTERNAL MEDICINE

## 2018-03-01 RX ORDER — SODIUM CHLORIDE 0.9 % (FLUSH) 0.9 %
5-10 SYRINGE (ML) INJECTION EVERY 8 HOURS
Status: DISCONTINUED | OUTPATIENT
Start: 2018-03-01 | End: 2018-03-01 | Stop reason: HOSPADM

## 2018-03-01 RX ORDER — SODIUM CHLORIDE 0.9 % (FLUSH) 0.9 %
5-10 SYRINGE (ML) INJECTION AS NEEDED
Status: DISCONTINUED | OUTPATIENT
Start: 2018-03-01 | End: 2018-03-01 | Stop reason: HOSPADM

## 2018-03-01 RX ORDER — PROPOFOL 10 MG/ML
INJECTION, EMULSION INTRAVENOUS AS NEEDED
Status: DISCONTINUED | OUTPATIENT
Start: 2018-03-01 | End: 2018-03-01 | Stop reason: HOSPADM

## 2018-03-01 RX ORDER — LIDOCAINE HYDROCHLORIDE 20 MG/ML
INJECTION, SOLUTION EPIDURAL; INFILTRATION; INTRACAUDAL; PERINEURAL AS NEEDED
Status: DISCONTINUED | OUTPATIENT
Start: 2018-03-01 | End: 2018-03-01 | Stop reason: HOSPADM

## 2018-03-01 RX ORDER — INSULIN LISPRO 100 [IU]/ML
INJECTION, SOLUTION INTRAVENOUS; SUBCUTANEOUS ONCE
Status: DISCONTINUED | OUTPATIENT
Start: 2018-03-01 | End: 2018-03-01 | Stop reason: HOSPADM

## 2018-03-01 RX ORDER — LIDOCAINE HYDROCHLORIDE 10 MG/ML
0.1 INJECTION, SOLUTION EPIDURAL; INFILTRATION; INTRACAUDAL; PERINEURAL AS NEEDED
Status: DISCONTINUED | OUTPATIENT
Start: 2018-03-01 | End: 2018-03-01 | Stop reason: HOSPADM

## 2018-03-01 RX ORDER — SODIUM CHLORIDE, SODIUM LACTATE, POTASSIUM CHLORIDE, CALCIUM CHLORIDE 600; 310; 30; 20 MG/100ML; MG/100ML; MG/100ML; MG/100ML
75 INJECTION, SOLUTION INTRAVENOUS CONTINUOUS
Status: DISCONTINUED | OUTPATIENT
Start: 2018-03-01 | End: 2018-03-01 | Stop reason: HOSPADM

## 2018-03-01 RX ADMIN — SODIUM CHLORIDE, SODIUM LACTATE, POTASSIUM CHLORIDE, AND CALCIUM CHLORIDE 75 ML/HR: 600; 310; 30; 20 INJECTION, SOLUTION INTRAVENOUS at 11:20

## 2018-03-01 RX ADMIN — FAMOTIDINE 20 MG: 10 INJECTION INTRAVENOUS at 11:26

## 2018-03-01 RX ADMIN — PROPOFOL 100 MG: 10 INJECTION, EMULSION INTRAVENOUS at 12:45

## 2018-03-01 RX ADMIN — LIDOCAINE HYDROCHLORIDE 40 MG: 20 INJECTION, SOLUTION EPIDURAL; INFILTRATION; INTRACAUDAL; PERINEURAL at 12:45

## 2018-03-01 NOTE — ANESTHESIA POSTPROCEDURE EVALUATION
Post-Anesthesia Evaluation and Assessment    Patient: Anel Garner MRN: [id number]  SSN: [id number]    YOB: 1933  Age: 80 y.o. Sex: female       Cardiovascular Function/Vital Signs  Visit Vitals    /55    Pulse 81    Temp 36.5 °C (97.7 °F)    Resp 20    Ht 5' 1\" (1.549 m)    Wt 63.5 kg (140 lb)    SpO2 96%    BMI 26.45 kg/m2       Patient is status post MAC anesthesia for Procedure(s):  UPPER ENDOSCOPY. Nausea/Vomiting: None    Postoperative hydration reviewed and adequate. Pain:  Pain Scale 1: Numeric (0 - 10) (03/01/18 1300)  Pain Intensity 1: 0 (03/01/18 1300)   Managed    Neurological Status: At baseline    Mental Status and Level of Consciousness: Arousable    Pulmonary Status:   O2 Device: Room air (03/01/18 1300)   Adequate oxygenation and airway patent    Complications related to anesthesia: None    Post-anesthesia assessment completed.  No concerns      Signed By: Fred Sy MD     March 1, 2018

## 2018-03-01 NOTE — IP AVS SNAPSHOT
[address] [facility] 
 
 
 [address] [address] 
[phone] Patient: Freddy Madrid MRN: [id number] BPD:1/9/2078 About your hospitalization You were admitted on:  March 1, 2018 You last received care in the:  [facility] You were discharged on:  March 1, 2018 Why you were hospitalized Your primary diagnosis was:  Not on File Follow-up Information Follow up With Details Comments Contact Info Alley Mckenna MD Schedule an appointment as soon as possible for a visit in 4 week(s)  [address] [place] [address] [address] 200 [facility] 
[phone] Shaan Varner [address] 200 [facility] 
[phone] Your Scheduled Appointments Friday April 20, 2018 11:40 AM EDT Follow Up with Triston Becker MD  
[facility] ([address]) [place] [address]  
[phone] Discharge Orders None A check juan indicates which time of day the medication should be taken. My Medications CHANGE how you take these medications Instructions Each Dose to Equal  
 Morning Noon Evening Bedtime  
 furosemide 40 mg tablet Commonly known as:  LASIX What changed:  See the new instructions. Your last dose was: Your next dose is: TAKE 1 TABLET DAILY FOR EDEMA CONTINUE taking these medications Instructions Each Dose to Equal  
 Morning Noon Evening Bedtime CALCITRATE PO Your last dose was: Your next dose is: Take 1 Tab by mouth daily. 1 Tab  
    
   
   
   
  
 celecoxib 200 mg capsule Commonly known as:  CeleBREX Your last dose was: Your next dose is: Take 1 Cap by mouth daily as needed. 200 mg  
    
   
   
   
  
 dabigatran etexilate 150 mg capsule Commonly known as:  PRADAXA Your last dose was: Your next dose is: Take 1 Cap by mouth two (2) times a day. 150 mg  
    
   
   
   
  
 dilTIAZem  mg ER capsule Commonly known as:  CARDIZEM CD Your last dose was: Your next dose is: TAKE 1 CAPSULE DAILY  
     
   
   
   
  
 GENTAK 0.3 % (3 mg/gram) ophthalmic ointment Generic drug:  gentamicin Your last dose was: Your next dose is:    
   
   
      
   
   
   
  
 losartan 25 mg tablet Commonly known as:  COZAAR Your last dose was: Your next dose is: TAKE 1 TABLET DAILY  
     
   
   
   
  
 methIMAzole 5 mg tablet Commonly known as:  TAPAZOLE Your last dose was: Your next dose is: Take 2.5 mg by mouth nightly. 2.5 mg  
    
   
   
   
  
 omeprazole 40 mg capsule Commonly known as:  PRILOSEC Your last dose was: Your next dose is: Take 1 Cap by mouth daily. 40 mg  
    
   
   
   
  
 PLAQUENIL 200 mg tablet Generic drug:  hydroxychloroquine Your last dose was: Your next dose is: Take 200 mg by mouth two (2) times a day. 200 mg  
    
   
   
   
  
 prednisoLONE acetate 1 % ophthalmic suspension Commonly known as:  PRED FORTE Your last dose was: Your next dose is:    
   
   
 Administer 1 Drop to right eye two (2) times a day. 1 Drop REFRESH TEARS 0.5 % Drop ophthalmic solution Generic drug:  carboxymethylcellulose sodium Your last dose was: Your next dose is:    
   
   
 Apply 1 Drop to eye two (2) times a day. 1 Drop RESTASIS 0.05 % ophthalmic emulsion Generic drug:  cycloSPORINE Your last dose was: Your next dose is:    
   
   
 Administer 1 Drop to both eyes two (2) times a day. 1 Drop  
    
   
   
   
  
 sertraline 100 mg tablet Commonly known as:  ZOLOFT Your last dose was: Your next dose is: Take 1 Tab by mouth daily. 100 mg  
    
   
   
   
  
 simvastatin 10 mg tablet Commonly known as:  ZOCOR Your last dose was: Your next dose is: Take 1 Tab by mouth nightly. 10 mg  
    
   
   
   
  
 SYSTANE (PROPYLENE GLYCOL) 0.4-0.3 % Drop Generic drug:  peg 400-propylene glycol Your last dose was: Your next dose is:    
   
   
 Administer 1 Drop to right eye as needed. 1 Drop  
    
   
   
   
  
 tobramycin 0.3 % ophthalmic solution Commonly known as:  Abhijeet Celis Your last dose was: Your next dose is: VITAMIN C 500 mg tablet Generic drug:  ascorbic acid (vitamin C) Your last dose was: Your next dose is: Take 1,000 mg by mouth daily. 1000 mg  
    
   
   
   
  
 VITAMIN D2 50,000 unit capsule Generic drug:  ergocalciferol Your last dose was: Your next dose is: Take 50,000 Units by mouth two (2) times a week. 45415 Units Discharge Instructions Upper GI Endoscopy: What to Expect at [facility] Your Recovery After you have an endoscopy, you will stay at the hospital or clinic for 1 to 2 hours. This will allow the medicine to wear off. You will be able to go home after your doctor or nurse checks to make sure you are not having any problems. You may have to stay overnight if you had treatment during the test. You may have a sore throat for a day or two after the test. 
This care sheet gives you a general idea about what to expect after the test. 
How can you care for yourself at home? Activity · Rest as much as you need to after you go home. · You should be able to go back to your usual activities the day after the test. 
Diet · Follow your doctor's directions for eating after the test. 
 · Drink plenty of fluids (unless your doctor has told you not to). Medications · If you have a sore throat the day after the test, use an over-the-counter spray to numb your throat. Follow-up care is a key part of your treatment and safety. Be sure to make and go to all appointments, and call your doctor if you are having problems. It's also a good idea to know your test results and keep a list of the medicines you take. When should you call for help? Call 911 anytime you think you may need emergency care. For example, call if: 
· You passed out (lost consciousness). · You cough up blood. · You vomit blood or what looks like coffee grounds. · You pass maroon or very bloody stools. Call your doctor now or seek immediate medical care if: 
· You have trouble swallowing. · You have belly pain. · Your stools are black and tarlike or have streaks of blood. · You are sick to your stomach or cannot keep fluids down. Watch closely for changes in your health, and be sure to contact your doctor if: 
· Your throat still hurts after a day or two. · You do not get better as expected. Where can you learn more? Go to PHRQL.be Enter [phone] in the search box to learn more about \"Upper GI Endoscopy: What to Expect at Home. \"  
© 2841-1608 Healthwise, Incorporated. Care instructions adapted under license by [employer] (which disclaims liability or warranty for this information). This care instruction is for use with your licensed healthcare professional. If you have questions about a medical condition or this instruction, always ask your healthcare professional. [address] any warranty or liability for your use of this information. Content Version: 27.6.122596; Current as of: November 14, 2014 Hiatal Hernia: Care Instructions Your Care Instructions A hiatal hernia occurs when part of the stomach bulges into the chest cavity. A hiatal hernia may allow stomach acid and juices to back up into the esophagus (acid reflux). This can cause a feeling of burning, warmth, heat, or pain behind the breastbone. This feeling may often occur after you eat, soon after you lie down, or when you bend forward, and it may come and go. You also may have a sour taste in your mouth. These symptoms are commonly known as heartburn or reflux. But not all hiatal hernias cause symptoms. Follow-up care is a key part of your treatment and safety. Be sure to make and go to all appointments, and call your doctor if you are having problems. It's also a good idea to know your test results and keep a list of the medicines you take. How can you care for yourself at home? · Take your medicines exactly as prescribed. Call your doctor if you think you are having a problem with your medicine. · Do not take aspirin or other nonsteroidal anti-inflammatory drugs (NSAIDs), such as ibuprofen (Advil, Motrin) or naproxen (Aleve), unless your doctor says it is okay. Ask your doctor what you can take for pain. · Your doctor may recommend over-the-counter medicine. For mild or occasional indigestion, antacids such as Tums, Gaviscon, Maalox, or Mylanta may help. Your doctor also may recommend over-the-counter acid reducers, such as famotidine (Pepcid AC), cimetidine (Tagamet HB), ranitidine (Zantac 75 and Zantac 150), or omeprazole (Prilosec). Read and follow all instructions on the label. If you use these medicines often, talk with your doctor. · Change your eating habits. ¨ It's best to eat several small meals instead of two or three large meals. ¨ After you eat, wait 2 to 3 hours before you lie down. Late-night snacks aren't a good idea. ¨ Chocolate, mint, and alcohol can make heartburn worse. They relax the valve between the esophagus and the stomach.  
¨ Spicy foods, foods that have a lot of acid (like tomatoes and oranges), and coffee can make heartburn symptoms worse in some people. If your symptoms are worse after you eat a certain food, you may want to stop eating that food to see if your symptoms get better. · Do not smoke or chew tobacco. 
· If you get heartburn at night, raise the head of your bed 6 to 8 inches by putting the frame on blocks or placing a foam wedge under the head of your mattress. (Adding extra pillows does not work.) · Do not wear tight clothing around your middle. · Lose weight if you need to. Losing just 5 to 10 pounds can help. When should you call for help? Call your doctor now or seek immediate medical care if: 
? · You have new or worse belly pain. ? · You are vomiting. ? Watch closely for changes in your health, and be sure to contact your doctor if: 
? · You have new or worse symptoms of indigestion. ? · You have trouble or pain swallowing. ? · You are losing weight. ? · You do not get better as expected. Where can you learn more? Go to http://elmo-milly.info/. Enter N346 in the search box to learn more about \"Hiatal Hernia: Care Instructions. \" Current as of: May 12, 2017 Content Version: 11.4 © 1331-8053 [employer]. Care instructions adapted under license by [employer] (which disclaims liability or warranty for this information). If you have questions about a medical condition or this instruction, always ask your healthcare professional. [place] [address] any warranty or liability for your use of this information. DISCHARGE SUMMARY from Nurse POST-PROCEDURE INSTRUCTIONS: 
 
Call your Physician if you: 
? Observe any excess bleeding. ? Develop a temperature over 100.5o F. 
? Experience abdominal, shoulder or chest pain. ? Notice any signs of decreased circulation or nerve impairment to an extremity such as a change in color, persistent numbness, tingling, coldness or increase in pain. ? Vomit blood or you have nausea and vomiting lasting longer than 4 hours. ? Are unable to take medications. ? Are unable to urinate within 8 hours after discharge following general anesthesia or intravenous sedation. For the next 24 hours after receiving general anesthesia or intravenous sedation, or while taking prescription Narcotics, limit your activities: 
? Do NOT drive a motor vehicle, operate hazard machinery or power tools, or perform tasks that require coordination. The medication you received during your procedure may have some effect on your mental awareness. ? Do NOT make important personal or business decisions. The medication you received during your procedure may have some effect on your mental awareness. ? Do NOT drink alcoholic beverages. These drinks do not mix well with the medications that have been given to you. ? Upon discharge from the hospital, you must be accompanied by a responsible adult. ? Resume your diet as directed by your physician. ? Resume medications as your physician has prescribed. ? Please give a list of your current medications to your Primary Care Provider. ? Please update this list whenever your medications are discontinued, doses are changed, or new medications (including over-the-counter products) are added. ? Please carry medication information at all times in case of emergency situations. These are general instructions for a healthy lifestyle: No smoking/ No tobacco products/ Avoid exposure to second hand smoke. ? Surgeon General's Warning:  Quitting smoking now greatly reduces serious risk to your health. Obesity, smoking, and a sedentary lifestyle greatly increase your risk for illness. ? A healthy diet, regular physical exercise & weight monitoring are important for maintaining a healthy lifestyle ?  You may be retaining fluid if you have a history of heart failure or if you experience any of the following symptoms:  Weight gain of 3 pounds or more overnight or 5 pounds in a week, increased swelling in our hands or feet or shortness of breath while lying flat in bed. Please call your doctor as soon as you notice any of these symptoms; do not wait until your next office visit. Recognize signs and symptoms of STROKE: 
F  -  Face looks uneven A  -  Arms unable to move or move unevenly S  -  Speech slurred or non-existent T  -  Time to call 911 - as soon as signs and symptoms begin - DO NOT go back to bed or wait to see If you get better - TIME IS BRAIN. Colorectal Screening ? Colorectal cancer almost always develops from precancerous polyps (abnormal growths) in the colon or rectum. Screening tests can find precancerous polyps, so that they can be removed before they turn into cancer. Screening tests can also find colorectal cancer early, when treatment works best. 
? Speak with your physician about when you should begin screening and how often you should be tested ? Additional Information If you have questions, please call [phone]. Remember, [employer] is NOT to be used for urgent needs. For medical emergencies, dial 911. Educational references and/or instructions provided during this visit included: 
 
Hiatal hernia APPOINTMENTS: 
 
Please make a follow-up appointment with your physician. Discharge information has been reviewed with the patient and care taker. The patient and caretaker verbalized understanding. ACO Transitions of Care Introducing [employer] [address] Sid offers a voluntary care coordination program to provide high quality service and care to [facility] fee-for-service beneficiaries. Osmany Polanco was designed to help you enhance your health and well-being through the following services: ? Transitions of Care  support for individuals who are transitioning from one care setting to another (example: Hospital to home). ? Chronic and Complex Care Coordination  support for individuals and caregivers of those with serious or chronic illnesses or with more than one chronic (ongoing) condition and those who take a number of different medications. If you meet specific medical criteria, a [facility] may call you directly to coordinate your care with your primary care physician and your other care providers. For questions about the [facility] programs, please, contact your physicians office. For general questions or additional information about Accountable Care Organizations: 
Please visit www.medicare.gov/acos. html or call 1-800-MEDICARE (9-651.395.9000) TTY users should call 5-913.682.6319. Introducing [facility]! Torrey Trimble introduces Thingies patient portal. Now you can access parts of your medical record, email your doctor's office, and request medication refills online. 1. In your internet browser, go to [email or web address] 2. Click on the First Time User? Click Here link in the Sign In box. You will see the New Member Sign Up page. 3. Enter your Thingies Access Code exactly as it appears below. You will not need to use this code after youve completed the sign-up process. If you do not sign up before the expiration date, you must request a new code. · Thingies Access Code: [email or web address] Expires: 5/23/2018 11:29 AM 
 
4. Enter the last four digits of your Social Security Number (xxxx) and Date of Birth (mm/dd/yyyy) as indicated and click Submit. You will be taken to the next sign-up page. 5. Create a Thingies ID. This will be your Thingies login ID and cannot be changed, so think of one that is secure and easy to remember. 6. Create a Thingies password. You can change your password at any time. 7. Enter your Password Reset Question and Answer.  This can be used at a later time if you forget your password. 8. Enter your e-mail address. You will receive e-mail notification when new information is available in [address] 9. Click Sign Up. You can now view and download portions of your medical record. 10. Click the Download Summary menu link to download a portable copy of your medical information. If you have questions, please visit the Frequently Asked Questions section of the MyChart website. Remember, [employer] is NOT to be used for urgent needs. For medical emergencies, dial 911. Now available from your iPhone and Android! Providers Seen During Your Hospitalization Provider Specialty Primary office phone Alley Mckenna MD Gastroenterology [phone] Your Primary Care Physician (PCP) Primary Care Physician Office Phone Office Fax Luz Victor [phone] You are allergic to the following Allergen Reactions Etanercept Other (comments) Caused bad infection behind knee 5/2015 Caused bad infection behind knee 5/2015 Caused bad infection behind knee 5/2015 Caused bad infection behind knee 5/2015 Morphine Nausea and Vomiting Other (comments) N&V 
N&V 
vomitting Haloperidol Other (comments) Hallucinations, agitation, increased BP Haloperidol Lactate Other (comments) Hallucinations, agitation, increased BP Hallucinations, agitation, increased BP Recent Documentation Height Weight BMI OB Status Smoking Status 1.549 m 63.5 kg 26.45 kg/m2 Hysterectomy Light Tobacco Smoker Emergency Contacts Name Discharge Info Relation Home Work [facility]  Daughter [21] [phone] Michael Weathers  Spouse [3] [phone] Mahad Sandy DISCHARGE CAREGIVER [3] Caregiver [13] [phone] Patient Belongings The following personal items are in your possession at time of discharge: Dental Appliances: Uppers  Visual Aid: None Please provide this summary of care documentation to your next provider. Signatures-by signing, you are acknowledging that this After Visit Summary has been reviewed with you and you have received a copy. Patient Signature:  ____________________________________________________________ Date:  ____________________________________________________________  
  
Nanetta Little Silver Provider Signature:  ____________________________________________________________ Date:  ____________________________________________________________

## 2018-03-01 NOTE — ANESTHESIA PREPROCEDURE EVALUATION
Anesthetic History   No history of anesthetic complications            Review of Systems / Medical History  Patient summary reviewed and pertinent labs reviewed    Pulmonary          Smoker         Neuro/Psych         Psychiatric history     Cardiovascular    Hypertension: well controlled  Valvular problems/murmurs: mitral insufficiency      Dysrhythmias : atrial fibrillation           GI/Hepatic/Renal           PUD     Endo/Other      Hypothyroidism  Anemia     Other Findings   Comments: Documentation of current medication  Current medications obtained, documented and obtained? YES      Risk Factors for Postoperative nausea/vomiting:       History of postoperative nausea/vomiting? NO       Female? YES       Motion sickness? NO       Intended opioid administration for postoperative analgesia? NO      Smoking Abstinence:  Current Smoker? YES  Elective Surgery? YES  Seen preoperatively by anesthesiologist or proxy prior to day of surgery? YES  Pt abstained from smoking 24 hours prior to anesthesia?  YES    Preventive care/screening for High Blood Pressure:  Aged 18 years and older: YES  Screened for high blood pressure: YES  Patients with high blood pressure referred to primary care provider   for BP management: YES                 Physical Exam    Airway  Mallampati: II  TM Distance: > 6 cm  Neck ROM: normal range of motion   Mouth opening: Normal     Cardiovascular  Regular rate and rhythm,  S1 and S2 normal,  no murmur, click, rub, or gallop             Dental  No notable dental hx       Pulmonary  Breath sounds clear to auscultation               Abdominal  GI exam deferred       Other Findings            Anesthetic Plan    ASA: 3  Anesthesia type: MAC          Induction: Intravenous  Anesthetic plan and risks discussed with: Patient

## 2018-03-01 NOTE — DISCHARGE INSTRUCTIONS
Upper GI Endoscopy: What to Expect at [address] [place] [address]  After you have an endoscopy, you will stay at the hospital or clinic for 1 to 2 hours. This will allow the medicine to wear off. You will be able to go home after your doctor or nurse checks to make sure you are not having any problems. You may have to stay overnight if you had treatment during the test. You may have a sore throat for a day or two after the test.  This care sheet gives you a general idea about what to expect after the test.  How can you care for yourself at home? Activity  · Rest as much as you need to after you go home. · You should be able to go back to your usual activities the day after the test.  Diet  · Follow your doctor's directions for eating after the test.  · Drink plenty of fluids (unless your doctor has told you not to). Medications  · If you have a sore throat the day after the test, use an over-the-counter spray to numb your throat. Follow-up care is a key part of your treatment and safety. Be sure to make and go to all appointments, and call your doctor if you are having problems. It's also a good idea to know your test results and keep a list of the medicines you take. When should you call for help? Call 911 anytime you think you may need emergency care. For example, call if:  · You passed out (lost consciousness). · You cough up blood. · You vomit blood or what looks like coffee grounds. · You pass maroon or very bloody stools. Call your doctor now or seek immediate medical care if:  · You have trouble swallowing. · You have belly pain. · Your stools are black and tarlike or have streaks of blood. · You are sick to your stomach or cannot keep fluids down. Watch closely for changes in your health, and be sure to contact your doctor if:  · Your throat still hurts after a day or two. · You do not get better as expected. Where can you learn more?    Go to DealExplorer.be  Enter J454 in the search box to learn more about \"Upper GI Endoscopy: What to Expect at Home. \"   © 7667-1534 [employer]. Care instructions adapted under license by [employer] (which disclaims liability or warranty for this information). This care instruction is for use with your licensed healthcare professional. If you have questions about a medical condition or this instruction, always ask your healthcare professional. [address]  any warranty or liability for your use of this information. Content Version: 39.7.160814; Current as of: November 14, 2014     Hiatal Hernia: Care Instructions  Your Care Instructions  A hiatal hernia occurs when part of the stomach bulges into the chest cavity. A hiatal hernia may allow stomach acid and juices to back up into the esophagus (acid reflux). This can cause a feeling of burning, warmth, heat, or pain behind the breastbone. This feeling may often occur after you eat, soon after you lie down, or when you bend forward, and it may come and go. You also may have a sour taste in your mouth. These symptoms are commonly known as heartburn or reflux. But not all hiatal hernias cause symptoms. Follow-up care is a key part of your treatment and safety. Be sure to make and go to all appointments, and call your doctor if you are having problems. It's also a good idea to know your test results and keep a list of the medicines you take. How can you care for yourself at home? · Take your medicines exactly as prescribed. Call your doctor if you think you are having a problem with your medicine. · Do not take aspirin or other nonsteroidal anti-inflammatory drugs (NSAIDs), such as ibuprofen (Advil, Motrin) or naproxen (Aleve), unless your doctor says it is okay. Ask your doctor what you can take for pain. · Your doctor may recommend over-the-counter medicine. For mild or occasional indigestion, antacids such as Tums, Gaviscon, Maalox, or Mylanta may help.  Your doctor also may recommend over-the-counter acid reducers, such as famotidine (Pepcid AC), cimetidine (Tagamet HB), ranitidine (Zantac 75 and Zantac 150), or omeprazole (Prilosec). Read and follow all instructions on the label. If you use these medicines often, talk with your doctor. · Change your eating habits. ¨ It's best to eat several small meals instead of two or three large meals. ¨ After you eat, wait 2 to 3 hours before you lie down. Late-night snacks aren't a good idea. ¨ Chocolate, mint, and alcohol can make heartburn worse. They relax the valve between the esophagus and the stomach. ¨ Spicy foods, foods that have a lot of acid (like tomatoes and oranges), and coffee can make heartburn symptoms worse in some people. If your symptoms are worse after you eat a certain food, you may want to stop eating that food to see if your symptoms get better. · Do not smoke or chew tobacco.  · If you get heartburn at night, raise the head of your bed 6 to 8 inches by putting the frame on blocks or placing a foam wedge under the head of your mattress. (Adding extra pillows does not work.)  · Do not wear tight clothing around your middle. · Lose weight if you need to. Losing just 5 to 10 pounds can help. When should you call for help? Call your doctor now or seek immediate medical care if:  ? · You have new or worse belly pain. ? · You are vomiting. ? Watch closely for changes in your health, and be sure to contact your doctor if:  ? · You have new or worse symptoms of indigestion. ? · You have trouble or pain swallowing. ? · You are losing weight. ? · You do not get better as expected. Where can you learn more? Go to http://elmo-milly.info/. Enter G219 in the search box to learn more about \"Hiatal Hernia: Care Instructions. \"  Current as of: May 12, 2017  Content Version: 11.4  © 3783-6763 [employer].  Care instructions adapted under license by [employer] (which disclaims liability or warranty for this information). If you have questions about a medical condition or this instruction, always ask your healthcare professional. [address] any warranty or liability for your use of this information. DISCHARGE SUMMARY from Nurse     POST-PROCEDURE INSTRUCTIONS:    Call your Physician if you:  ? Observe any excess bleeding. ? Develop a temperature over 100.5o F.  ? Experience abdominal, shoulder or chest pain. ? Notice any signs of decreased circulation or nerve impairment to an extremity such as a change in color, persistent numbness, tingling, coldness or increase in pain. ? Vomit blood or you have nausea and vomiting lasting longer than 4 hours. ? Are unable to take medications. ? Are unable to urinate within 8 hours after discharge following general anesthesia or intravenous sedation. For the next 24 hours after receiving general anesthesia or intravenous sedation, or while taking prescription Narcotics, limit your activities:  ? Do NOT drive a motor vehicle, operate hazard machinery or power tools, or perform tasks that require coordination. The medication you received during your procedure may have some effect on your mental awareness. ? Do NOT make important personal or business decisions. The medication you received during your procedure may have some effect on your mental awareness. ? Do NOT drink alcoholic beverages. These drinks do not mix well with the medications that have been given to you. ? Upon discharge from the hospital, you must be accompanied by a responsible adult. ? Resume your diet as directed by your physician. ? Resume medications as your physician has prescribed. ? Please give a list of your current medications to your Primary Care Provider. ?  Please update this list whenever your medications are discontinued, doses are changed, or new medications (including over-the-counter products) are added.  ? Please carry medication information at all times in case of emergency situations. These are general instructions for a healthy lifestyle:    No smoking/ No tobacco products/ Avoid exposure to second hand smoke.  Surgeon General's Warning:  Quitting smoking now greatly reduces serious risk to your health. Obesity, smoking, and a sedentary lifestyle greatly increase your risk for illness.  A healthy diet, regular physical exercise & weight monitoring are important for maintaining a healthy lifestyle   You may be retaining fluid if you have a history of heart failure or if you experience any of the following symptoms:  Weight gain of 3 pounds or more overnight or 5 pounds in a week, increased swelling in our hands or feet or shortness of breath while lying flat in bed. Please call your doctor as soon as you notice any of these symptoms; do not wait until your next office visit. Recognize signs and symptoms of STROKE:  F  -  Face looks uneven  A  -  Arms unable to move or move unevenly  S  -  Speech slurred or non-existent  T  -  Time to call 911 - as soon as signs and symptoms begin - DO NOT go back to bed or wait to see If you get better - TIME IS BRAIN. Colorectal Screening   Colorectal cancer almost always develops from precancerous polyps (abnormal growths) in the colon or rectum. Screening tests can find precancerous polyps, so that they can be removed before they turn into cancer. Screening tests can also find colorectal cancer early, when treatment works best.  Deena Hidalgo Speak with your physician about when you should begin screening and how often you should be tested     Additional Information    If you have questions, please call [phone]. Remember, [employer] is NOT to be used for urgent needs. For medical emergencies, dial 911.     Educational references and/or instructions provided during this visit included:    Hiatal hernia      APPOINTMENTS:    Please make a follow-up appointment with your physician. Discharge information has been reviewed with the patient and care taker. The patient and caretaker verbalized understanding.

## 2018-03-01 NOTE — H&P
[facility]   [email or web address]      Impression:   1. JAYDEN      Plan:   EGD  1. Chief Complaint:   JAYDEN    HPI:  Diego Carlson is a 80 y.o. female who is being seen on consult for the above. [place] in the past revealed large TA. No gross bleeding, pt on Pradaxa, R/O UGI lesion. PMH:   Past Medical History:   Diagnosis Date    Acute gastric ulcer without mention of hemorrhage, perforation, or obstruction 1989    surg repair    Anemia     Arrhythmia     Atrial fibrillation ([facility] [address] 12/14/2011    Cataract 8-2001    RIGHT    Depression 4-    Edema leg RIGHT     NORMAL VENOUS DOPPLER    Goiter Nodular 7-30-01    History of echocardiogram 04/20/2015    EF 50-55%. No RWMA. Mild LVH. Indeterminate diastolic fx. RVSP 40 mmHg. Severe LAE. Mild JENNY. Mild MVP of anterior leaflet. Mod MR w/2 jets. Mod TR. No significant chg from study of 6/26/14.  History of Holter monitoring 01/09/2012    Well controlled atrial fibrillation. Avg HR 83 bpm.      Hypercholesterolemia     Hypertension     Hyperthyroidism 12/14/2011    Insomnia 6-18-09    Leg swelling     Lower extremity venous duplex 12/18/2013    Right leg:  No venous thrombosis.     Multinodular goiter     Nonspecific reaction to tuberculin skin test without active tuberculosis     Osteopenia 8/27/2010    Osteoporosis 2-2009    s/p reclast rx    Otitis media     Retina, separation 2004    detached retina    Rheumatoid arthritis(714.0)     rheumatoid    Septic arthritis [facility]) May 2015    R knee following with Ortho and ID    Skin cancer (melanoma) ([facility] [address] 03/2013    Symptomatic menopausal or female climacteric states     Thyroid disease        PSH:   Past Surgical History:   Procedure Laterality Date    HX BLEPHAROPLASTY      HX HEENT      hx detached retina    HX HIP REPLACEMENT Left     HX KNEE ARTHROSCOPY      scheduled for replacement but could not do due to infection    HX MOHS PROCEDURES  9-2002    LEFT    HX ORTHOPAEDIC      right leg sx    HX SKIN BIOPSY  04/05/2013    malignant melanoma    HX HANNY AND BSO  1967    AK COLONOSCOPY FLX DX W/COLLJ SPEC WHEN PFRMD  8-2001    polyp(tubular adenoma); Dr Lewayne Mcardle  5-    (+)polyp (bx: tubular adenoma) Dr. Davidson Padilla HX:   Social History     Social History    Marital status:      Spouse name: N/A    Number of children: N/A    Years of education: N/A     Occupational History    Not on file. Social History Main Topics    Smoking status: Light Tobacco Smoker     Years: 45.00     Types: Cigarettes     Last attempt to quit: 8/27/2003    Smokeless tobacco: Never Used    Alcohol use 0.0 oz/week     0 Standard drinks or equivalent per week      Comment: occasionally    Drug use: No    Sexual activity: Not on file     Other Topics Concern    Not on file     Social History Narrative       FHX:   Family History   Problem Relation Age of Onset    Hypertension Mother     Heart Disease Mother     Osteoporosis Mother     Alcohol abuse Father        Allergy:   Allergies   Allergen Reactions    Etanercept Other (comments)     Caused bad infection behind knee 5/2015  Caused bad infection behind knee 5/2015  Caused bad infection behind knee 5/2015  Caused bad infection behind knee 5/2015    Morphine Nausea and Vomiting and Other (comments)     N&V  N&V  vomitting    Haloperidol Other (comments)     Hallucinations, agitation, increased BP    Haloperidol Lactate Other (comments)     Hallucinations, agitation, increased BP  Hallucinations, agitation, increased BP       Home Medications:     Prescriptions Prior to Admission   Medication Sig    VITAMIN D2 50,000 unit capsule Take 50,000 Units by mouth two (2) times a week.  ascorbic acid, vitamin C, (VITAMIN C) 500 mg tablet Take 1,000 mg by mouth daily.     simvastatin (ZOCOR) 10 mg tablet Take 1 Tab by mouth nightly.  omeprazole (PRILOSEC) 40 mg capsule Take 1 Cap by mouth daily.  dabigatran etexilate (PRADAXA) 150 mg capsule Take 1 Cap by mouth two (2) times a day.  GENTAK 0.3 % (3 mg/gram) ophthalmic ointment     tobramycin (TOBREX) 0.3 % ophthalmic solution     furosemide (LASIX) 40 mg tablet TAKE 1 TABLET DAILY FOR EDEMA (Patient taking differently: TAKE 1 TABLET BY MOUTH EVERY OTHER DAY FOR EDEMA)    dilTIAZem CD (CARDIZEM CD) 180 mg ER capsule TAKE 1 CAPSULE DAILY    sertraline (ZOLOFT) 100 mg tablet Take 1 Tab by mouth daily.  celecoxib (CELEBREX) 200 mg capsule Take 1 Cap by mouth daily as needed.  REFRESH TEARS 0.5 % drop ophthalmic solution Apply 1 Drop to eye two (2) times a day.  losartan (COZAAR) 25 mg tablet TAKE 1 TABLET DAILY    hydroxychloroquine (PLAQUENIL) 200 mg tablet Take 200 mg by mouth two (2) times a day.  cycloSPORINE (RESTASIS) 0.05 % ophthalmic emulsion Administer 1 Drop to both eyes two (2) times a day.  peg 400-propylene glycol (SYSTANE) 0.4-0.3 % Drop Administer 1 Drop to right eye as needed.  methimazole (TAPAZOLE) 5 mg tablet Take 2.5 mg by mouth nightly.  CALCIUM CITRATE (CALCITRATE PO) Take 1 Tab by mouth daily.  prednisoLONE acetate (PRED FORTE) 1 % ophthalmic suspension Administer 1 Drop to right eye two (2) times a day. Review of Systems:     Constitutional: No fevers, chills, weight loss, fatigue. Skin: No rashes, pruritis, jaundice, ulcerations, erythema. HENT: No headaches, nosebleeds, sinus pressure, rhinorrhea, sore throat. Eyes: No visual changes, blurred vision, eye pain, photophobia, jaundice. Cardiovascular: No chest pain, heart palpitations. Respiratory: No cough, SOB, wheezing, chest discomfort, orthopnea. Gastrointestinal: neg   Genitourinary: No dysuria, bleeding, discharge, pyuria. Musculoskeletal: No weakness, arthralgias, wasting. Endo: No sweats. Heme: No bruising, easy bleeding.    Allergies: As noted.   Neurological: Cranial nerves intact. Alert and oriented. Gait not assessed. Psychiatric:  No anxiety, depression, hallucinations. Visit Vitals    /69    Pulse 82    Temp 98 °F (36.7 °C)    Resp 20    Ht 5' 1\" (1.549 m)    Wt 63.5 kg (140 lb)    SpO2 100%    BMI 26.45 kg/m2       Physical Assessment:     constitutional: appearance: well developed, well nourished, normal habitus, no deformities, in no acute distress. skin: inspection: no rashes, ulcers, icterus or other lesions; no clubbing or telangiectasias. palpation: no induration or subcutaneos nodules. eyes: inspection: normal conjunctivae and lids; no jaundice pupils: symmetrical, normoreactive to light, normal accommodation and size. ENMT: mouth: normal oral mucosa,lips and gums; good dentition. oropharynx: normal tongue, hard and soft palate; posterior pharynx without erithema, exudate or lesions. neck: thyroid: normal size, consistency and position; no masses or tenderness. respiratory: effort: normal chest excursion; no intercostal retraction or accessory muscle use. cardiovascular: abdominal aorta: normal size and position; no bruits. palpation: PMI of normal size and position; normal rhythm; no thrill or murmurs. abdominal: abdomen: normal consistency; no tenderness or masses. hernias: no hernias appreciated. liver: normal size and consistency. spleen: not palpable. rectal: hemoccult/guaiac: not performed. musculoskeletal: digits and nails: no clubbing, cyanosis, petechiae or other inflammatory conditions. gait: normal gait and station head and neck: normal range of motion; no pain, crepitation or contracture. spine/ribs/pelvis: normal range of motion; no pain, deformity or contracture. lymphatic: axilae: not palpable. groin: not palpable. neck: within normal limits. other: not palpable. neurologic: cranial nerves: II-XII normal.   psychiatric: judgement/insight: within normal limits.  memory: within normal limits for recent and remote events. mood and affect: no evidence of depression, anxiety or agitation. orientation: oriented to time, space and person. Basic Metabolic Profile   No results for input(s): NA, K, CL, CO2, BUN, GLU, CA, MG, PHOS in the last 72 hours. No lab exists for component: CREAT      CBC w/Diff    No results for input(s): WBC, RBC, HGB, HCT, MCV, MCH, MCHC, RDW, PLT, HGBEXT, HCTEXT, PLTEXT in the last 72 hours. No lab exists for component: MPV No results for input(s): GRANS, LYMPH, EOS, PRO, MYELO, METAS, BLAST in the last 72 hours. No lab exists for component: MONO, BASO     Hepatic Function   No results for input(s): ALB, TP, TBILI, GPT, SGOT, AP, AML, LPSE in the last 72 hours. No lab exists for component: Chuy Smith MD, M.D. [facility] 1947, [address] [facility]  [email or web address]

## 2018-03-09 DIAGNOSIS — E78.00 HYPERCHOLESTEROLEMIA: ICD-10-CM

## 2018-03-11 RX ORDER — SIMVASTATIN 10 MG/1
10 TABLET, FILM COATED ORAL
Qty: 90 TAB | Refills: 3 | Status: SHIPPED | OUTPATIENT
Start: 2018-03-11 | End: 2019-01-01 | Stop reason: SDUPTHER

## 2018-04-20 ENCOUNTER — OFFICE VISIT (OUTPATIENT)
Dept: CARDIOLOGY CLINIC | Age: 83
End: 2018-04-20

## 2018-04-20 VITALS
HEART RATE: 82 BPM | SYSTOLIC BLOOD PRESSURE: 121 MMHG | DIASTOLIC BLOOD PRESSURE: 72 MMHG | WEIGHT: 143 LBS | HEIGHT: 61 IN | BODY MASS INDEX: 27 KG/M2

## 2018-04-20 DIAGNOSIS — I48.19 PERSISTENT ATRIAL FIBRILLATION (HCC): Primary | ICD-10-CM

## 2018-04-20 DIAGNOSIS — E78.00 HYPERCHOLESTEROLEMIA: ICD-10-CM

## 2018-04-20 DIAGNOSIS — D64.9 ANEMIA, UNSPECIFIED TYPE: ICD-10-CM

## 2018-04-20 DIAGNOSIS — I10 ESSENTIAL HYPERTENSION: ICD-10-CM

## 2018-04-20 NOTE — MR AVS SNAPSHOT
[address] [address] [address] [id number] [phone] Patient: Jin Melo MRN:  [id number] Visit Information Date & Time Provider Department Dept. Phone Encounter #  
 4/20/2018 11:40 AM Diego Albert MD [facility] [address] [id number] Your Appointments 8/22/2018 11:00 AM  
Office Visit with Irais Wells MD  
[facility] of [address] [place] [address]) Appt Note: 6 mos f/u  
 [address] [facility], [address] [address] [address] [place]  
  
   
 [address], [address]  
  
    
 10/19/2018 11:40 AM  
Follow Up with Diego Albert MD  
[facility] \[facility]\"" ([address]) Appt Note: 6 month f/up [place] [address] [address] [address] [phone] [address] [facility] [address] Upcoming Health Maintenance Date Due  
 MEDICARE YEARLY EXAM 2/23/2019 GLAUCOMA SCREENING Q2Y 10/19/2019 DTaP/Tdap/Td series (2 - Td) 5/27/2026 Allergies as of 4/20/2018  Review Complete On: 3/1/2018 By: Yuly Houston RN Severity Noted Reaction Type Reactions Etanercept High 10/21/2015    Other (comments) Caused bad infection behind knee 5/2015 Caused bad infection behind knee 5/2015 Caused bad infection behind knee 5/2015 Caused bad infection behind knee 5/2015 Morphine High 07/28/2014    Nausea and Vomiting, Other (comments) N&V 
N&V 
vomitting Haloperidol  01/03/2017    Other (comments) Hallucinations, agitation, increased BP Haloperidol Lactate  01/03/2017    Other (comments) Hallucinations, agitation, increased BP Hallucinations, agitation, increased BP Current Immunizations  Reviewed on 10/17/2016 Name Date Influenza High Dose Vaccine PF 10/16/2017 Influenza Vaccine 10/9/2014 Influenza Vaccine (Quad) PF 10/17/2016 Influenza Vaccine PF 10/9/2013 Influenza Vaccine Split 11/1/2010 Influenza Vaccine Whole 10/11/2012 Tdap 5/27/2016 ZZZ-RETIRED (DO NOT USE) Pneumococcal Vaccine (Unspecified Type) 8/9/2012, 11/21/1994 Not reviewed this visit You Were Diagnosed With   
  
 Codes Comments Persistent atrial fibrillation (HCC)    -  Primary ICD-10-CM: I48.1 ICD-9-CM: 427.31 Mitral valve disorder     ICD-10-CM: I05.9 ICD-9-CM: 394.9 Essential hypertension     ICD-10-CM: I10 
ICD-9-CM: 401.9 Hypercholesterolemia     ICD-10-CM: E78.00 ICD-9-CM: 272.0 Vitals BP Pulse Height(growth percentile) Weight(growth percentile) BMI OB Status 121/72 82 5' 1\" (1.549 m) 143 lb (64.9 kg) 27.02 kg/m2 Hysterectomy Smoking Status Light Tobacco Smoker Vitals History BMI and BSA Data Body Mass Index Body Surface Area  
 27.02 kg/m 2 1.67 m 2 Preferred Pharmacy Pharmacy Name Phone [address] [facility] [phone] Your Updated Medication List  
  
   
This list is accurate as of 4/20/18 12:26 PM.  Always use your most recent med list.  
  
  
  
  
 celecoxib 200 mg capsule Commonly known as:  CeleBREX Take 1 Cap by mouth daily as needed. dabigatran etexilate 150 mg capsule Commonly known as:  PRADAXA Take 1 Cap by mouth two (2) times a day. dilTIAZem  mg ER capsule Commonly known as:  CARDIZEM CD  
TAKE 1 CAPSULE DAILY  
  
 furosemide 40 mg tablet Commonly known as:  LASIX TAKE 1 TABLET DAILY FOR EDEMA  
  
 GENTAK 0.3 % (3 mg/gram) ophthalmic ointment Generic drug:  gentamicin  
  
 losartan 25 mg tablet Commonly known as:  COZAAR  
TAKE 1 TABLET DAILY  
  
 methIMAzole 5 mg tablet Commonly known as:  TAPAZOLE Take 2.5 mg by mouth nightly. omeprazole 40 mg capsule Commonly known as:  PRILOSEC  
 Take 1 Cap by mouth daily. PLAQUENIL 200 mg tablet Generic drug:  hydroxychloroquine Take 200 mg by mouth two (2) times a day. prednisoLONE acetate 1 % ophthalmic suspension Commonly known as:  PRED FORTE Administer 1 Drop to right eye two (2) times a day. REFRESH TEARS 0.5 % Drop ophthalmic solution Generic drug:  carboxymethylcellulose sodium Apply 1 Drop to eye two (2) times a day. RESTASIS 0.05 % ophthalmic emulsion Generic drug:  cycloSPORINE Administer 1 Drop to both eyes two (2) times a day. sertraline 100 mg tablet Commonly known as:  ZOLOFT Take 1 Tab by mouth daily. simvastatin 10 mg tablet Commonly known as:  ZOCOR Take 1 Tab by mouth nightly. SYSTANE (PROPYLENE GLYCOL) 0.4-0.3 % Drop Generic drug:  peg 400-propylene glycol Administer 1 Drop to right eye as needed. tobramycin 0.3 % ophthalmic solution Commonly known as:  TOBREX  
  
 VITAMIN C 500 mg tablet Generic drug:  ascorbic acid (vitamin C) Take 1,000 mg by mouth daily. VITAMIN D2 50,000 unit capsule Generic drug:  ergocalciferol Take 50,000 Units by mouth two (2) times a week. We Performed the Following AMB POC EKG ROUTINE W/ 12 LEADS, INTER & REP [36181 CPT(R)] To-Do List   
 04/20/2018 Lab:  METABOLIC PANEL, BASIC Introducing [facility]! [facility] introduces [employer] patient portal. Now you can access parts of your medical record, email your doctor's office, and request medication refills online. 1. In your internet browser, go to [email or web address] 2. Click on the First Time User? Click Here link in the Sign In box. You will see the New Member Sign Up page. 3. Enter your [employer] Access Code exactly as it appears below. You will not need to use this code after youve completed the sign-up process. If you do not sign up before the expiration date, you must request a new code. · [employer] Access Code: [email or web address] Expires: 5/23/2018 12:29 PM 
 
4. Enter the last four digits of your Social Security Number (xxxx) and Date of Birth (mm/dd/yyyy) as indicated and click Submit. You will be taken to the next sign-up page. 5. Create a [employer] ID. This will be your [employer] login ID and cannot be changed, so think of one that is secure and easy to remember. 6. Create a [employer] password. You can change your password at any time. 7. Enter your Password Reset Question and Answer. This can be used at a later time if you forget your password. 8. Enter your e-mail address. You will receive e-mail notification when new information is available in [address] 9. Click Sign Up. You can now view and download portions of your medical record. 10. Click the Download Summary menu link to download a portable copy of your medical information. If you have questions, please visit the Frequently Asked Questions section of the [employer] website. Remember, [employer] is NOT to be used for urgent needs. For medical emergencies, dial 911. Now available from your iPhone and Android! Please provide this summary of care documentation to your next provider. Your primary care clinician is listed as Vandana Tomas. If you have any questions after today's visit, please call [phone].

## 2018-04-20 NOTE — PROGRESS NOTES
HISTORY OF PRESENT ILLNESS  Rafat Jacobson is a 80 y.o. female. Irregular Heart Beat    Associated symptoms include lower extremity edema. Pertinent negatives include no fever, no chest pain, no claudication, no orthopnea, no PND, no abdominal pain, no nausea, no vomiting, no headaches, no dizziness, no cough and no shortness of breath. Leg Swelling   Pertinent negatives include no chest pain, no abdominal pain, no headaches and no shortness of breath. Patient presents for a follow-up office visit. She was originally referred here for new onset atrial fibrillation. Patient has a past medical history significant for hypertension, dyslipidemia, long-standing rheumatoid arthritis, and recently diagnosed hyperthyroidism. The patient was started on anticoagulation initially with warfarin and then switched to Pradaxa. She has been managed with rate controlling agents. She was initially started on metoprolol for rate control, however this was then switched to verapamil, because she could not tolerate the beta blocker due to to extreme fatigue. However, she is now managed with diltiazem for rate control. A follow-up echocardiogram in September 2016 showed low normal left ventricular ejection fraction 50-55% with severe left atrial enlargement and mild pulmonary hypertension, PASP 40 mmHg. She was last seen in the office 6 months ago. Since last visit, she continues to follow regularly with her hematologist for chronic anemia. She states she receives fairly regular iron transfusions. She underwent a carotid duplex scan in November 2070 which showed mild bilateral disease. She does still have leg swelling which worsens throughout the day but will improve at night. No significant change. No significant worsening palpitations, no dizziness, no syncope. No shortness of breath at rest or with exertion. No chest pain or pressure.     Past Medical History:   Diagnosis Date    Acute gastric ulcer without mention of hemorrhage, perforation, or obstruction 1989    surg repair    Anemia     Arrhythmia     Atrial fibrillation ([facility] [address] [facility] 12/14/2011    Cataract 8-2001    RIGHT    Depression 4-    Edema leg RIGHT     NORMAL VENOUS DOPPLER    Goiter Nodular 7-30-01    History of echocardiogram 04/20/2015    EF 50-55%. No RWMA. Mild LVH. Indeterminate diastolic fx. RVSP 40 mmHg. Severe LAE. Mild JENNY. Mild MVP of anterior leaflet. Mod MR w/2 jets. Mod TR. No significant chg from study of 6/26/14.  History of Holter monitoring 01/09/2012    Well controlled atrial fibrillation. Avg HR 83 bpm.      Hypercholesterolemia     Hypertension     Hyperthyroidism 12/14/2011    Insomnia 6-18-09    Leg swelling     Lower extremity venous duplex 12/18/2013    Right leg:  No venous thrombosis.  Multinodular goiter     Osteopenia 8/27/2010    Osteoporosis 2-2009    s/p reclast rx    Otitis media     Retina, separation 2004    detached retina    Rheumatoid arthritis(714.0)     rheumatoid    Septic arthritis [facility]) May 2015    R knee following with Ortho and ID    Skin cancer (melanoma) ([facility] [address] 03/2013    Symptomatic menopausal or female climacteric states     Thyroid disease     Tuberculin test reaction       Current Outpatient Prescriptions   Medication Sig Dispense Refill    simvastatin (ZOCOR) 10 mg tablet Take 1 Tab by mouth nightly. 90 Tab 3    VITAMIN D2 50,000 unit capsule Take 50,000 Units by mouth two (2) times a week.  ascorbic acid, vitamin C, (VITAMIN C) 500 mg tablet Take 1,000 mg by mouth daily.  omeprazole (PRILOSEC) 40 mg capsule Take 1 Cap by mouth daily. 90 Cap 3    dabigatran etexilate (PRADAXA) 150 mg capsule Take 1 Cap by mouth two (2) times a day.  180 Cap 3    GENTAK 0.3 % (3 mg/gram) ophthalmic ointment   3    tobramycin (TOBREX) 0.3 % ophthalmic solution       furosemide (LASIX) 40 mg tablet TAKE 1 TABLET DAILY FOR EDEMA (Patient taking differently: TAKE 1 TABLET BY MOUTH EVERY OTHER DAY FOR EDEMA) 90 Tab 3    dilTIAZem CD (CARDIZEM CD) 180 mg ER capsule TAKE 1 CAPSULE DAILY 90 Cap 3    sertraline (ZOLOFT) 100 mg tablet Take 1 Tab by mouth daily. 90 Tab 3    celecoxib (CELEBREX) 200 mg capsule Take 1 Cap by mouth daily as needed. 90 Cap 3    REFRESH TEARS 0.5 % drop ophthalmic solution Apply 1 Drop to eye two (2) times a day.  prednisoLONE acetate (PRED FORTE) 1 % ophthalmic suspension Administer 1 Drop to right eye two (2) times a day.  losartan (COZAAR) 25 mg tablet TAKE 1 TABLET DAILY 90 Tab 3    hydroxychloroquine (PLAQUENIL) 200 mg tablet Take 200 mg by mouth two (2) times a day.  cycloSPORINE (RESTASIS) 0.05 % ophthalmic emulsion Administer 1 Drop to both eyes two (2) times a day.  peg 400-propylene glycol (SYSTANE) 0.4-0.3 % Drop Administer 1 Drop to right eye as needed.  methimazole (TAPAZOLE) 5 mg tablet Take 2.5 mg by mouth nightly. Allergies   Allergen Reactions    Etanercept Other (comments)     Caused bad infection behind knee 5/2015  Caused bad infection behind knee 5/2015  Caused bad infection behind knee 5/2015  Caused bad infection behind knee 5/2015    Morphine Nausea and Vomiting and Other (comments)     N&V  N&V  vomitting    Haloperidol Other (comments)     Hallucinations, agitation, increased BP    Haloperidol Lactate Other (comments)     Hallucinations, agitation, increased BP  Hallucinations, agitation, increased BP        Social History   Substance Use Topics    Smoking status: Light Tobacco Smoker     Years: 45.00     Types: Cigarettes     Last attempt to quit: 8/27/2003    Smokeless tobacco: Never Used    Alcohol use 0.0 oz/week     0 Standard drinks or equivalent per week      Comment: occasionally         Review of Systems   Constitutional: Negative for chills, fever and weight loss. HENT: Negative for nosebleeds. Eyes: Negative for blurred vision and double vision.    Respiratory: Negative for cough, shortness of breath and wheezing. Cardiovascular: Positive for palpitations and leg swelling. Negative for chest pain, orthopnea, claudication and PND. Gastrointestinal: Negative for abdominal pain, heartburn, nausea and vomiting. Genitourinary: Negative for dysuria and hematuria. Musculoskeletal: Negative for falls, joint pain and myalgias. Skin: Negative for rash. Neurological: Negative for dizziness, focal weakness and headaches. Endo/Heme/Allergies: Does not bruise/bleed easily. Psychiatric/Behavioral: Negative for substance abuse. Visit Vitals    /72    Pulse 82    Ht 5' 1\" (1.549 m)    Wt 64.9 kg (143 lb)    BMI 27.02 kg/m2      Physical Exam   Constitutional: She is oriented to person, place, and time. She appears well-developed and well-nourished. HENT:   Head: Normocephalic and atraumatic. Eyes: Conjunctivae are normal.   Neck: Neck supple. No JVD present. Carotid bruit is present ( Right). Cardiovascular: S1 normal, S2 normal and normal pulses. An irregularly irregular rhythm present. Exam reveals no gallop. Murmur heard. High-pitched blowing holosystolic murmur is present with a grade of 2/6  at the apex  Pulmonary/Chest: Effort normal and breath sounds normal. She has no wheezes. She has no rales. Abdominal: Soft. Bowel sounds are normal. There is no tenderness. Musculoskeletal: She exhibits edema (1+ edema bilaterally to the shins). Neurological: She is alert and oriented to person, place, and time. Skin: Skin is warm and dry. EKG: Atrial fibrillation, normal axis, heart rate in the 80s, poor R-wave progression, nonspecific ST abnormality. No change compared to the previous EKG. ASSESSMENT and PLAN    Persistent atrial fibrillation. Initially diagnosed in December 2011, which now appears to be persistent in nature. She remains on Pradaxa 150 mg bid for anticoagulation.    Because of her significantly elevated risk of having a CVA, I would recommend remaining on oral anticoagulation. She did not tolerate her beta blocker for rate control, so was initially switched to verapamil, however, the patient was having difficulty getting this filled, so she is now maintained on Cardizem CD. Her rates appear well controlled on this regimen. I would continue her current medical regimen. She will require regular Chem-7 panels to assess her renal function. Once her creatinine clearance becomes 30 her last, I would decrease her Pradaxa dosage down to 75 mg twice daily. Hypertension. Patient's blood pressure losartan and appears well-controlled on a combination of diltiazem. Dyslipidemia. Patient is on simvastatin 10 mg daily. This is followed by her PCP. Dependent edema. The patient states she takes Lasix for this every other day. Chronic kidney disease, stage III. Her creatinine has been stable. This should be checked at least 2-3 times year because of her Pradaxa requirement. Anemia. This appears stable. She is now being followed regularly by hematology. This is felt to be related to iron deficiency. She received iron transfusions sporadically. Right carotid bruit. patient did not have any significant carotid artery disease on a recent duplex scan in November 2017. No further workup needed. Followup in 6 months, sooner if needed.

## 2018-04-20 NOTE — PROGRESS NOTES
1. Have you been to the ER, urgent care clinic since your last visit? Hospitalized since your last visit? 2. Have you seen or consulted any other health care providers outside of the [address] [place] [address] since your last visit? Include any pap smears or colon screening.

## 2018-07-18 ENCOUNTER — TELEPHONE (OUTPATIENT)
Dept: CARDIOLOGY CLINIC | Age: 83
End: 2018-07-18

## 2018-07-18 NOTE — TELEPHONE ENCOUNTER
Patient's daughter, Jn Vallejo called to inquire if Dr Jaylin Winn will allow patient to d/c use of Pradaxa 75 mg bid her H&H being below 7 and having to have a blood transfusion yesterday. As a result of her blood lost she has an iron deficiency and now requires iron transfusions. Patient's daughter states patient has been suffering with a lower GI bleed for a few months and is currently being treated by a GI Dr.     Informed her Dr Jaylin Winn is on vacation, I will address it with the physician in office and call her back.

## 2018-07-19 NOTE — TELEPHONE ENCOUNTER
Verbal order and read back per Arvilla Lefort, DO    Spoke to Dr Arvilla Lefort in absence of Dr. Kathy Conrad about patient's bleeding issue. Dr. Pal Murphy advised for patient to d/c use of anticoagulation therapy for a fib at this time due to the lower GI bleeds resulting in the need for multiple blood transfusion. Dr. Pal Murphy would not advise starting ASA 81 as it will not be beneficial for a fib. This has been fully explained to the patient,'s daughter Christa Valle who indicates understanding.

## 2018-07-26 DIAGNOSIS — F32.89 OTHER DEPRESSION: ICD-10-CM

## 2018-07-26 RX ORDER — SERTRALINE HYDROCHLORIDE 100 MG/1
100 TABLET, FILM COATED ORAL DAILY
Qty: 90 TAB | Refills: 3 | Status: SHIPPED | OUTPATIENT
Start: 2018-07-26 | End: 2019-01-01 | Stop reason: ALTCHOICE

## 2018-07-26 NOTE — TELEPHONE ENCOUNTER
Last Visit: 02/22/2018 with MD Lisandro Mccormick    Next Appointment: 08/22/2018 with MD Lisandro Mccormick   Previous Refill Encounters: 08/17/2017 per MD Lisandro Mccormick #90 with 3 refills     Requested Prescriptions     Pending Prescriptions Disp Refills    sertraline (ZOLOFT) 100 mg tablet 90 Tab 3     Sig: Take 1 Tab by mouth daily.

## 2018-08-13 ENCOUNTER — TELEPHONE (OUTPATIENT)
Dept: INTERNAL MEDICINE CLINIC | Age: 83
End: 2018-08-13

## 2018-08-13 NOTE — TELEPHONE ENCOUNTER
Mr. Elisha Conner cancelled her appt for the 22nd. Her  passed away this weekend so she is going to go and stay with her son for a while. She will call at a later date to reschedule.

## 2018-09-07 RX ORDER — DILTIAZEM HYDROCHLORIDE 180 MG/1
CAPSULE, COATED, EXTENDED RELEASE ORAL
Qty: 90 CAP | Refills: 3 | Status: SHIPPED | OUTPATIENT
Start: 2018-09-07 | End: 2019-01-01 | Stop reason: SDUPTHER

## 2018-09-13 ENCOUNTER — TELEPHONE (OUTPATIENT)
Dept: INTERNAL MEDICINE CLINIC | Age: 83
End: 2018-09-13

## 2018-09-13 ENCOUNTER — HOME HEALTH ADMISSION (OUTPATIENT)
Dept: HOME HEALTH SERVICES | Facility: HOME HEALTH | Age: 83
End: 2018-09-13

## 2018-09-13 DIAGNOSIS — S32.10XA CLOSED FRACTURE OF SACRUM, UNSPECIFIED PORTION OF SACRUM, INITIAL ENCOUNTER (HCC): Primary | ICD-10-CM

## 2018-09-13 NOTE — TELEPHONE ENCOUNTER
Chitra at [facility] calling. They cannot go out until doctor has seen the patient to have a note. Please call her and advise [phone]. I see from message below that patient is not even in our area or mobile.

## 2018-09-13 NOTE — TELEPHONE ENCOUNTER
There is not a [facility] in [facility], I believe it goes to the [place] and that's it. Usually, the  at the hospital will set them up with home health in the area they are staying in. Also, with medicare, they require the patient to be seen by the ordering physician within 30 days of ordering home health.

## 2018-09-13 NOTE — TELEPHONE ENCOUNTER
Please let her know that I ordered a HH order for PT/OT evaluation/treatment plan given recent sacral fracture. They will contact the pt to set up a schedule for these services. Please schedule the pt to see me in 4-6 wks. Have her schedule an apt with Orthopedics as well once she is able to get to their office for an apt.     Dr. Reyez President  [facility], [address] [place] [address], [address] [place] [address] Str.  Phone: [phone]  Fax: [phone]

## 2018-09-13 NOTE — TELEPHONE ENCOUNTER
I have not called her yet because I want to be sure that [facility] is available in [address] [place] [address] Michael Alexis. She is there right now with family and that is where she will be staying for a while.

## 2018-09-13 NOTE — TELEPHONE ENCOUNTER
Patient's daughter-in-law, Cooper Wiggins, is calling stating Ms. Tiff Azul is in the hospital in [place]. She is going to be discharged soon and they told her to call us and inquire about having home health set up there in the area. They are telling them that she would need to go to a clinic there in order to do it but she has a fractured sacrum so traveling will not be easy. She is looking for some advice.

## 2018-09-13 NOTE — TELEPHONE ENCOUNTER
Patient's daughter in law called again. She is upset that no one has called her. Can a nurse please call her an talk to her?

## 2018-09-13 NOTE — TELEPHONE ENCOUNTER
Please let her know that I ordered a HH order for PT/OT evaluation/treatment plan given recent sacral fracture. They will contact the pt to set up a schedule for these services. Please schedule the pt to see me in 4-6 wks. Have her schedule an apt with Orthopedics as well once she is able to get to their office for an apt. Family member reached and informed of the above orders that Dr Don Bay has placed. The daughter of patient Shubham Guaman states that her mother will not be returning to this area for awhile, and she will remain within her area until better. Ms Unique Desouza understands to expect the [facility] team of [place] to contact her, and at that time she can let them know if our patient will be needing their services or not. Ms Unique Desouza understands she needs to work closely with the acting Physician that is at this moment still caring for the patient in hospital, and she states the physician has offered the patient to be transferred from the hospital where she is presently still in, to a Rehab facility, but so far the patient, and Ms Calhoun Lyly have not said yes they would like to do that. Ms Unique Desouza states she wants a better facility for the patient.

## 2018-09-17 NOTE — TELEPHONE ENCOUNTER
Home Health calling again saying they will close out the order until patient is back in the area. Says once pt is back please place another order if Home Health is needed.

## 2018-09-26 RX ORDER — LOSARTAN POTASSIUM 25 MG/1
25 TABLET ORAL DAILY
Qty: 90 TAB | Refills: 3 | Status: SHIPPED | OUTPATIENT
Start: 2018-09-26 | End: 2019-01-01

## 2018-09-26 NOTE — TELEPHONE ENCOUNTER
Last Visit: 02/22/2018 with MD Rodney Spence    Next Appointment: noted to f/u in 6 months for BP check   Previous Refill Encounters: 11/21/2016 per NP Kathy Shepherd #90 with 3 refills     Requested Prescriptions     Pending Prescriptions Disp Refills    losartan (COZAAR) 25 mg tablet 90 Tab 3     Sig: Take 1 Tab by mouth daily.

## 2018-10-29 ENCOUNTER — OFFICE VISIT (OUTPATIENT)
Dept: CARDIOLOGY CLINIC | Age: 83
End: 2018-10-29

## 2018-10-29 VITALS
OXYGEN SATURATION: 97 % | BODY MASS INDEX: 27 KG/M2 | SYSTOLIC BLOOD PRESSURE: 120 MMHG | WEIGHT: 143 LBS | DIASTOLIC BLOOD PRESSURE: 66 MMHG | HEART RATE: 79 BPM | HEIGHT: 61 IN

## 2018-10-29 DIAGNOSIS — S06.309D INTRACRANIAL HEMORRHAGE FOLLOWING INJURY, WITH LOSS OF CONSCIOUSNESS, SUBSEQUENT ENCOUNTER: ICD-10-CM

## 2018-10-29 DIAGNOSIS — E78.00 HYPERCHOLESTEROLEMIA: ICD-10-CM

## 2018-10-29 DIAGNOSIS — I48.19 PERSISTENT ATRIAL FIBRILLATION (HCC): Primary | ICD-10-CM

## 2018-10-29 DIAGNOSIS — D64.9 ANEMIA, UNSPECIFIED TYPE: ICD-10-CM

## 2018-10-29 DIAGNOSIS — I10 ESSENTIAL HYPERTENSION: ICD-10-CM

## 2018-10-29 NOTE — PROGRESS NOTES
HISTORY OF PRESENT ILLNESS  Ria Boas is a 80 y.o. female. Patient presents for a follow-up office visit. She was originally referred here for new onset atrial fibrillation. Patient has a past medical history significant for hypertension, dyslipidemia, long-standing rheumatoid arthritis, and recently diagnosed hyperthyroidism. The patient was started on anticoagulation initially with warfarin and then switched to Pradaxa. She has been managed with rate controlling agents. She was initially started on metoprolol for rate control, however this was then switched to verapamil, because she could not tolerate the beta blocker due to to extreme fatigue. However, she is now managed with diltiazem for rate control. A follow-up echocardiogram in September 2016 showed low normal left ventricular ejection fraction 50-55% with severe left atrial enlargement and mild pulmonary hypertension, PASP 40 mmHg. The patient's anticoagulation was stopped in July 2018 when she developed acute blood loss anemia presumed GI in nature due to AVMs. She did require 2 units of packed red blood cells. In September 2018 she suffered a fall down the stairs in her garage in [place] when she was visiting her son, suffering a sacral fracture and a skull fracture and a small intracranial hemorrhage in the cerebellum. She just recently finished outpatient physical therapy. She has returned to the area to live back home. She does not remember much of the episode, but feels she tripped and fell down the stairs. She has not had any heart palpitations, syncope or near syncope since that time. She does have dizzy spells when she stands up too quickly.     Past Medical History:   Diagnosis Date    Acute gastric ulcer without mention of hemorrhage, perforation, or obstruction 1989    surg repair    Anemia     Arrhythmia     Atrial fibrillation ([facility] [address] 12/14/2011    Cataract 8-2001    RIGHT    Depression 4-  Edema leg RIGHT     NORMAL VENOUS DOPPLER    Goiter Nodular 7-30-01    History of echocardiogram 04/20/2015    EF 50-55%. No RWMA. Mild LVH. Indeterminate diastolic fx. RVSP 40 mmHg. Severe LAE. Mild EJNNY. Mild MVP of anterior leaflet. Mod MR w/2 jets. Mod TR. No significant chg from study of 6/26/14.  History of Holter monitoring 01/09/2012    Well controlled atrial fibrillation. Avg HR 83 bpm.      Hypercholesterolemia     Hypertension     Hyperthyroidism 12/14/2011    Insomnia 6-18-09    Leg swelling     Lower extremity venous duplex 12/18/2013    Right leg:  No venous thrombosis.  Multinodular goiter     Osteopenia 8/27/2010    Osteoporosis 2-2009    s/p reclast rx    Otitis media     Retina, separation 2004    detached retina    Rheumatoid arthritis(714.0)     rheumatoid    Septic arthritis [facility]) May 2015    R knee following with Ortho and ID    Skin cancer (melanoma) ([facility] [address] 03/2013    Symptomatic menopausal or female climacteric states     Thyroid disease     Tuberculin test reaction       Current Outpatient Medications   Medication Sig Dispense Refill    losartan (COZAAR) 25 mg tablet Take 1 Tab by mouth daily. 90 Tab 3    dilTIAZem CD (CARDIZEM CD) 180 mg ER capsule TAKE 1 CAPSULE DAILY 90 Cap 3    sertraline (ZOLOFT) 100 mg tablet Take 1 Tab by mouth daily. 90 Tab 3    simvastatin (ZOCOR) 10 mg tablet Take 1 Tab by mouth nightly. 90 Tab 3    VITAMIN D2 50,000 unit capsule Take 50,000 Units by mouth two (2) times a week.  ascorbic acid, vitamin C, (VITAMIN C) 500 mg tablet Take 1,000 mg by mouth daily.  omeprazole (PRILOSEC) 40 mg capsule Take 1 Cap by mouth daily. 90 Cap 3    dabigatran etexilate (PRADAXA) 150 mg capsule Take 1 Cap by mouth two (2) times a day.  180 Cap 3    GENTAK 0.3 % (3 mg/gram) ophthalmic ointment   3    tobramycin (TOBREX) 0.3 % ophthalmic solution       furosemide (LASIX) 40 mg tablet TAKE 1 TABLET DAILY FOR EDEMA (Patient taking differently: TAKE 1 TABLET BY MOUTH EVERY OTHER DAY FOR EDEMA) 90 Tab 3    celecoxib (CELEBREX) 200 mg capsule Take 1 Cap by mouth daily as needed. 90 Cap 3    REFRESH TEARS 0.5 % drop ophthalmic solution Apply 1 Drop to eye two (2) times a day.  prednisoLONE acetate (PRED FORTE) 1 % ophthalmic suspension Administer 1 Drop to right eye two (2) times a day.  hydroxychloroquine (PLAQUENIL) 200 mg tablet Take 200 mg by mouth two (2) times a day.  cycloSPORINE (RESTASIS) 0.05 % ophthalmic emulsion Administer 1 Drop to both eyes two (2) times a day.  peg 400-propylene glycol (SYSTANE) 0.4-0.3 % Drop Administer 1 Drop to right eye as needed.  methimazole (TAPAZOLE) 5 mg tablet Take 2.5 mg by mouth nightly. Allergies   Allergen Reactions    Etanercept Other (comments)     Caused bad infection behind knee 5/2015  Caused bad infection behind knee 5/2015  Caused bad infection behind knee 5/2015  Caused bad infection behind knee 5/2015    Morphine Nausea and Vomiting and Other (comments)     N&V  N&V  vomitting    Haloperidol Other (comments)     Hallucinations, agitation, increased BP    Haloperidol Lactate Other (comments)     Hallucinations, agitation, increased BP  Hallucinations, agitation, increased BP        Social History     Tobacco Use    Smoking status: Light Tobacco Smoker     Years: 45.00     Types: Cigarettes     Last attempt to quit: 8/27/2003     Years since quitting: 15.1    Smokeless tobacco: Never Used   Substance Use Topics    Alcohol use: Yes     Alcohol/week: 0.0 oz     Comment: occasionally    Drug use: No         Review of Systems   Constitutional: Negative for chills, fever and weight loss. HENT: Negative for nosebleeds. Eyes: Negative for blurred vision and double vision. Respiratory: Negative for cough, shortness of breath and wheezing. Cardiovascular: Negative for chest pain, palpitations, orthopnea, claudication, leg swelling and PND. Gastrointestinal: Negative for abdominal pain, heartburn, nausea and vomiting. Genitourinary: Negative for dysuria and hematuria. Musculoskeletal: Negative for falls, joint pain and myalgias. Skin: Negative for rash. Neurological: Positive for dizziness. Negative for focal weakness and headaches. Endo/Heme/Allergies: Does not bruise/bleed easily. Psychiatric/Behavioral: Negative for substance abuse. Visit Vitals  /66   Pulse 79   Ht 5' 1\" (1.549 m)   Wt 64.9 kg (143 lb)   SpO2 97%   BMI 27.02 kg/m²      Physical Exam   Constitutional: She is oriented to person, place, and time. She appears well-developed and well-nourished. HENT:   Head: Normocephalic and atraumatic. Eyes: Conjunctivae are normal.   Neck: Neck supple. No JVD present. Carotid bruit is present ( Right). Cardiovascular: S1 normal, S2 normal and normal pulses. An irregularly irregular rhythm present. Exam reveals no gallop. Murmur heard. High-pitched blowing holosystolic murmur is present with a grade of 2/6 at the apex. Pulmonary/Chest: Effort normal and breath sounds normal. She has no wheezes. She has no rales. Abdominal: Soft. Bowel sounds are normal. There is no tenderness. Musculoskeletal: She exhibits no edema. Neurological: She is alert and oriented to person, place, and time. Skin: Skin is warm and dry. EKG: Atrial fibrillation, normal axis, heart rate in the 70s, poor R-wave progression, nonspecific ST abnormality. No change compared to the previous EKG. ASSESSMENT and PLAN    Persistent atrial fibrillation. Initially diagnosed in December 2011, which now appears to be persistent in nature. Her anticoagulation was stopped in July 2018 due to acute blood loss anemia presumed from small bowel AVMs. She did require 2 units of blood at that time. More recently she suffered a traumatic head injury following a fall where she fractured her skull. She is no longer a candidate for anticoagulation. Her heart rates appear well-controlled on Cardizem, which I would continue. Hypertension. Patient's blood pressure losartan and appears well-controlled on a combination of diltiazem. If her blood pressure does run low in the future, I would stop her losartan. Dyslipidemia. Patient is on simvastatin 10 mg daily. This is followed by her PCP. Anemia. She did require 2 units of packed red blood cells 3 months ago. She is no longer on anticoagulation because of presumed GI bleeding due to small bowel AVMs. Right carotid bruit. patient did not have any significant carotid artery disease on a recent duplex scan in November 2017. No further workup needed. Followup in 6 months, sooner if needed.

## 2018-10-29 NOTE — PROGRESS NOTES
Katelyn Blandon presents today for   Chief Complaint   Patient presents with    Irregular Heart Beat     6 month follow up     Dizziness     sometimes        Lisa Weathers preferred language for health care discussion is english/other. Is someone accompanying this pt? Daughter     Is the patient using any DME equipment during [address]? Wheelchair    Depression Screening:  No flowsheet data found. Learning Assessment:  Learning Assessment 8/29/2016   PRIMARY LEARNER Patient   HIGHEST LEVEL OF EDUCATION - PRIMARY LEARNER  -   BARRIERS PRIMARY LEARNER -   CO-LEARNER CAREGIVER -   PRIMARY LANGUAGE ENGLISH   LEARNER PREFERENCE PRIMARY DEMONSTRATION   ANSWERED BY Patient   RELATIONSHIP SELF       Abuse Screening:  Abuse Screening Questionnaire 10/20/2015   Do you ever feel afraid of your partner? N   Are you in a relationship with someone who physically or mentally threatens you? N   Is it safe for you to go home? Y       Fall Risk  Fall Risk Assessment, last 12 mths 2/22/2018   Able to walk? Yes   Fall in past 12 months? No   Fall with injury? -   Number of falls in past 12 months -   Fall Risk Score -       Pt currently taking Anticoagulant therapy? No     Coordination of Care:  1. Have you been to the ER, urgent care clinic since your last visit? Hospitalized since your last visit? No     2. Have you seen or consulted any other health care providers outside of the [address] [place] [address] Sid since your last visit? Include any pap smears or colon screening.  No

## 2018-11-27 NOTE — TELEPHONE ENCOUNTER
Last Visit: 02/22/2018 with MD Avi Vicente  Next Appointment: noted to f/u in 6 months for BP check  Previous Refill Encounter(s): 12/18/2017 per MD Avi Vicente #[id number] with 3 refills    Requested Prescriptions     Pending Prescriptions Disp Refills    omeprazole (PRILOSEC) 40 mg capsule 90 Cap 3     Sig: Take 1 Cap by mouth daily.

## 2018-11-28 NOTE — TELEPHONE ENCOUNTER
Please schedule for a 30 min f/u apt for a BP check up. I have not seen her for >6 months.     Dr. Recinos Emilia  [facility], [address] [place] [address], [address] [place] [address] Str.  Phone: [phone]  Fax: [phone]

## 2018-12-24 PROBLEM — I67.1 CEREBRAL ANEURYSM: Status: ACTIVE | Noted: 2018-01-01

## 2018-12-24 PROBLEM — I07.1 TRICUSPID REGURGITATION: Status: ACTIVE | Noted: 2018-01-01

## 2018-12-24 PROBLEM — I27.20 PULMONARY HYPERTENSION (HCC): Status: ACTIVE | Noted: 2018-01-01

## 2018-12-24 NOTE — PROGRESS NOTES
INTERNISTS OF [facility]:  12/24/2018, MRN: [id number]      Tianna Carr is a 80 y.o. female and presents to clinic for Hospital Follow Up [facility] admission 9-08-18 due to Fall had a Concussion, Fractured Pelvic Bone all this happened away from home and out of the area)    Subjective:   Pt is an 79yo female with h/o iron deficiency, DJD, mitral valve disease, goiter, depression, AF, right MCA aneurysm, hyperthyroidism, HTN, rheumatoid arthritis, gastritis, MGUS, and osteopenia. 1. ICH/MCA Aneurysm, An Ex[employer] dizzinessemia, and AF: Since her last visit, she suffered a fall and suffered a sacral (S3) fracture, nondisplaced fracture involving the left side of the occipital bone, subdural hematoma, and intracerebral hemorrhage - in September 2018. [place] [facility] At that time, she was on Pradaxa for CVD risk reduction given her h/o AF. This medication was subsequently discontinued given her history of bleed. An echocardiogram was obtained. Findings are listed below. The patient was found to have an EF of 65% with moderate to severe tricuspid regurgitation present. She takes losartan, diltiazem, simvastatin, and Lasix. She reports no adverse side effects with taking these medications. Her BP is 124/68 today. She is not seeing a Neurologist here locally. She has dizzy spells off/on. Her dizzy spells are mostly with changes in position. They go away quickly w/o intervention. She uses her walker. She wears a MedAlert. Her last hemoglobin just before hospital d/c per the [facility] system, measured in the 9 range - in Sept 2018 - in the setting of her cerebral bleed. No falls since hospital d/c. No hematochezia/melene, vaginal bleeding, or hematuria. 9/11/18 Head MRI: Left occipital skull fracture with parenchymal signal abnormality in the underlying left cerebellar hemisphere which is most consistent with contusion injury.  A tiny amount of intraventricular hemorrhage is present layering in the occipital horns of the lateral ventricles bilaterally. There are small hemispheric subdural collections bilaterally which are suggestive of subdural hygromas or remote/chronic hematomas. There is no significant mass effect on the brain. Advanced chronic small vessel ischemic changes. Encephalomalacia in the left occipital lobe, suggesting a remote infarct or other insult. There is an aneurysm involving the MCA bifurcation on the right that measures approximately 5 mm. There is no significant stenosis in the intracranial vasculature. 9/12/18 Echocardiogram: Technically difficult study Preserved left ventricular systolic function. EF 65% Mild pulmonary hypertension at 39 mmHg. Moderate to severe tricuspid regurgitation present. No visible right to left shunting noted    2. Hyperthyroidism: Her TSH was elevated in the 6 range at her hospitalization in September. Her free T4 and T3 measurements were unremarkable. She has not had labs since Sept 2018. She continues to take 0.5 tab of methimazole tid and is followed by Endocrinology. Her next apt is in the spring. 3. RA: Present for over 6 months. Followed by Rheumatology. On plaquenil. No adverse side effects. \"I have had hardly any joint problem since I fell [in September 2018]. \"      4. Glaucoma: Scheduled to return to her eye doctor in the spring. She has worsening left eye blurry vision. She has chronic right eye blurry vision.         Patient Active Problem List    Diagnosis Date Noted    Cerebral aneurysm 12/24/2018    Tricuspid regurgitation 12/24/2018    Pulmonary hypertension ([address] 12/24/2018    Iron deficiency anemia 04/01/2017    MGUS (monoclonal gammopathy of unknown significance) 04/01/2017    Osteoarthritis of right knee 01/29/2015    Mitral valve disorder 01/12/2015    Goiter, toxic, multinodular 03/30/2014    Depression 05/10/2012    Atrial fibrillation ([address] 02/08/2012    Long term current use of anticoagulant therapy 12/19/2011    Hyperthyroidism 12/14/2011    RA [facility] 07/21/2011    HTN (hypertension) 08/27/2010    Hypercholesterolemia 08/27/2010    Gastritis 08/27/2010    Osteopenia 08/27/2010       Current Outpatient Medications   Medication Sig Dispense Refill    omeprazole (PRILOSEC) 40 mg capsule Take 1 Cap by mouth daily. 90 Cap 0    losartan (COZAAR) 25 mg tablet Take 1 Tab by mouth daily. 90 Tab 3    dilTIAZem CD (CARDIZEM CD) 180 mg ER capsule TAKE 1 CAPSULE DAILY 90 Cap 3    sertraline (ZOLOFT) 100 mg tablet Take 1 Tab by mouth daily. 90 Tab 3    simvastatin (ZOCOR) 10 mg tablet Take 1 Tab by mouth nightly. 90 Tab 3    VITAMIN D2 50,000 unit capsule Take 50,000 Units by mouth two (2) times a week.  ascorbic acid, vitamin C, (VITAMIN C) 500 mg tablet Take 1,000 mg by mouth daily.  GENTAK 0.3 % (3 mg/gram) ophthalmic ointment   3    tobramycin (TOBREX) 0.3 % ophthalmic solution       furosemide (LASIX) 40 mg tablet TAKE 1 TABLET DAILY FOR EDEMA (Patient taking differently: TAKE 1 TABLET BY MOUTH EVERY OTHER DAY FOR EDEMA) 90 Tab 3    celecoxib (CELEBREX) 200 mg capsule Take 1 Cap by mouth daily as needed. 90 Cap 3    REFRESH TEARS 0.5 % drop ophthalmic solution Apply 1 Drop to eye two (2) times a day.  prednisoLONE acetate (PRED FORTE) 1 % ophthalmic suspension Administer 1 Drop to right eye two (2) times a day.  hydroxychloroquine (PLAQUENIL) 200 mg tablet Take 200 mg by mouth two (2) times a day.  cycloSPORINE (RESTASIS) 0.05 % ophthalmic emulsion Administer 1 Drop to both eyes two (2) times a day.  peg 400-propylene glycol (SYSTANE) 0.4-0.3 % Drop Administer 1 Drop to right eye as needed.  methimazole (TAPAZOLE) 5 mg tablet Take 2.5 mg by mouth nightly.          Allergies   Allergen Reactions    Etanercept Other (comments)     Caused bad infection behind knee 5/2015  Caused bad infection behind knee 5/2015  Caused bad infection behind knee 5/2015  Caused bad infection behind knee 5/2015  Caused bad infection behind knee 5/2015  Caused bad infection behind knee 5/2015      Morphine Nausea and Vomiting and Other (comments)     N&V  N&V  vomitting  N&V    Haloperidol Other (comments)     Hallucinations, agitation, increased BP  Hallucinations, agitation, increased BP    Haloperidol Lactate Other (comments)     Hallucinations, agitation, increased BP  Hallucinations, agitation, increased BP       Past Medical History:   Diagnosis Date    Acute gastric ulcer without mention of hemorrhage, perforation, or obstruction 1989    surg repair    Anemia     Arrhythmia     Atrial fibrillation ([facility] 12/14/2011    Cataract 8-2001    RIGHT    Depression 4-    Edema leg RIGHT     NORMAL VENOUS DOPPLER    Goiter Nodular 7-30-01    History of echocardiogram 04/20/2015    EF 50-55%. No RWMA. Mild LVH. Indeterminate diastolic fx. RVSP 40 mmHg. Severe LAE. Mild JENNY. Mild MVP of anterior leaflet. Mod MR w/2 jets. Mod TR. No significant chg from study of 6/26/14.  History of Holter monitoring 01/09/2012    Well controlled atrial fibrillation. Avg HR 83 bpm.      Hypercholesterolemia     Hypertension     Hyperthyroidism 12/14/2011    Insomnia 6-18-09    Leg swelling     Lower extremity venous duplex 12/18/2013    Right leg:  No venous thrombosis.     Multinodular goiter     Osteopenia 8/27/2010    Osteoporosis 2-2009    s/p reclast rx    Otitis media     Retina, separation 2004    detached retina    Rheumatoid arthritis(714.0)     rheumatoid    Septic arthritis [facility]) May 2015    R knee following with Ortho and ID    Skin cancer (melanoma) ([facility] 03/2013    Symptomatic menopausal or female climacteric states     Thyroid disease     Tuberculin test reaction        Past Surgical History:   Procedure Laterality Date    HX BLEPHAROPLASTY      HX HEENT      hx detached retina    HX HIP REPLACEMENT Left     HX KNEE ARTHROSCOPY      scheduled for replacement but could not do due to infection    HX MOHS PROCEDURES      LEFT    HX ORTHOPAEDIC      right leg sx    HX SKIN BIOPSY  2013    malignant melanoma    HX HANNY AND BSO  1967    OR COLONOSCOPY FLX DX W/COLLJ SPEC WHEN PFRMD      polyp(tubular adenoma); Dr Marielos Choudhary  2013    (+)polyp (bx: tubular adenoma) Dr. Rocio Kennedy       Family History   Problem Relation Age of Onset    Hypertension Mother     Heart Disease Mother     Osteoporosis Mother     Alcohol abuse Father        Social History     Tobacco Use    Smoking status: Former Smoker     Years: 45.00     Types: Cigarettes     Last attempt to quit: 2018     Years since quittin.2    Smokeless tobacco: Never Used   Substance Use Topics    Alcohol use: Yes     Alcohol/week: 0.0 oz     Comment: occasionally       ROS   Review of Systems   Constitutional: Negative for chills and fever. HENT: Positive for hearing loss. Negative for ear pain and sore throat. Eyes: Positive for blurred vision. Negative for pain. Respiratory: Negative for cough and shortness of breath. Cardiovascular: Negative for chest pain. Gastrointestinal: Negative for abdominal pain, blood in stool and melena. Genitourinary: Negative for dysuria and hematuria. Musculoskeletal: Positive for joint pain (left wrist pain). Negative for myalgias. Skin: Negative for rash. Neurological: Negative for tingling, focal weakness and headaches. Endo/Heme/Allergies: Does not bruise/bleed easily. Psychiatric/Behavioral: Negative for substance abuse. Objective     Vitals:    18 1213   BP: 124/68   Pulse: 88   Resp: 12   Temp: 97.2 °F (36.2 °C)   TempSrc: Oral   SpO2: 94%   Weight: 149 lb 3.2 oz (67.7 kg)   Height: 5' 1\" (1.549 m)   PainSc:   0 - No pain       Physical Exam   Constitutional: She is oriented to person, place, and time and well-developed, well-nourished, and in no distress.    HENT:   Head: Normocephalic and atraumatic. Left Ear: External ear normal.   Nose: Nose normal.   Mouth/Throat: Oropharynx is clear and moist. No oropharyngeal exudate. Left ear cerumen impaction   Eyes: Conjunctivae and EOM are normal. Right eye exhibits no discharge. Left eye exhibits no discharge. No scleral icterus. Neck: Neck supple. Cardiovascular: Normal rate and intact distal pulses. Exam reveals no gallop and no friction rub. No murmur heard. Irregularly irregular HR   Pulmonary/Chest: Effort normal and breath sounds normal. No respiratory distress. She has no wheezes. She has no rales. Abdominal: Soft. Bowel sounds are normal. She exhibits no distension. There is no tenderness. There is no rebound and no guarding. Musculoskeletal: She exhibits no edema or tenderness (BUE except along b/l wrist areas). B;/l ulnar deviation   Lymphadenopathy:     She has no cervical adenopathy. Neurological: She is alert and oriented to person, place, and time. She exhibits normal muscle tone. Stable gait with walker   Skin: Skin is warm and dry. No erythema. Psychiatric: Affect normal.   Nursing note and vitals reviewed.       LABS   Data Review:   Lab Results   Component Value Date/Time    WBC 6.6 10/16/2017 02:56 PM    Hemoglobin, POC 10.9 (L) 05/20/2013 11:02 AM    HGB 8.4 (L) 10/16/2017 02:56 PM    Hematocrit, POC 32 (L) 05/20/2013 11:02 AM    HCT 28.3 (L) 10/16/2017 02:56 PM    PLATELET 571 59/47/4434 02:56 PM    MCV 95.6 10/16/2017 02:56 PM       Lab Results   Component Value Date/Time    Sodium 139 10/16/2017 02:56 PM    Potassium 5.2 10/16/2017 02:56 PM    Chloride 106 10/16/2017 02:56 PM    CO2 26 10/16/2017 02:56 PM    Anion gap 7 10/16/2017 02:56 PM    Glucose 95 10/16/2017 02:56 PM    BUN 23 (H) 10/16/2017 02:56 PM    Creatinine 1.13 10/16/2017 02:56 PM    BUN/Creatinine ratio 20 10/16/2017 02:56 PM    GFR est AA 56 (L) 10/16/2017 02:56 PM    GFR est non-AA 46 (L) 10/16/2017 02:56 PM    Calcium 8.8 10/16/2017 02:56 PM Lab Results   Component Value Date/Time    Cholesterol, total 170 10/16/2017 02:56 PM    HDL Cholesterol 98 (H) 10/16/2017 02:56 PM    LDL, calculated 54.8 10/16/2017 02:56 PM    VLDL, calculated 17.2 10/16/2017 02:56 PM    Triglyceride 86 10/16/2017 02:56 PM    CHOL/HDL Ratio 1.7 10/16/2017 02:56 PM       No results found for: HBA1C, HGBE8, DUD7TZHS, YDI7OMYW, FVN4JPDD    Assessment/Plan:   1. RA: Stable. - C/w f/u with her Rheumatology team  - C/w rx as prescribed. 2. ICH Bleed and MCA Aneurysm: Should she get repeat imaging next yr given her h/o right MCA aneurysm?  - I encouraged her to f/u with Neurology. Referral placed for recommendations. 3. HTN/AF:   - I encouraged her to f/u with Cardiology. C/w rx as prescribed. - I will have her stop pradaxa pending review from Cardiology and Neurology  - Checking a CMP    4. Hyperthyroidism:   - C/w rx as prescribed. - C/w Endocrine f/u  - Checking TFTs    5. Anemia: Per Sept 2018 records. +MGUS  - Checking iron studies, a CBC, and a CMP    6. Left Cerumen Impaction:  Okay to use Debrox over-the-counter. Referral placed to ENT for cerumen impaction removal.    7.  Blurry Vision: No eye pain. Placing a referral to her eye doctor      Health Maintenance Due   Topic Date Due    Shingrix Vaccine Age 49> (1 of 2) 06[birth year]09/1983     Lab review: labs are reviewed in the EHR and in the [facility] EHR. I also ordered labs as mentioned above    I have discussed the diagnosis with the patient and the intended plan as seen in the above orders. The patient has received an after-visit summary and questions were answered concerning future plans. I have discussed medication side effects and warnings with the patient as well. I have reviewed the plan of care with the patient, accepted their input and they are in agreement with the treatment goals. All questions were answered. The patient understands the plan of care. Handouts provided today with above information.  Pt instructed if symptoms worsen to call the office or report to the ED for continued care. Greater than 50% of the visit time was spent in counseling and/or coordination of care. Voice recognition was used to generate this report, which may have resulted in some phonetic based errors in grammar and contents. Even though attempts were made to correct all the mistakes, some may have been missed, and remained in the body of the document. Follow-up Disposition:  Return in about 6 months (around 6/24/2019).     Brittani Burdick MD

## 2018-12-24 NOTE — PATIENT INSTRUCTIONS
Patient was given a copy of the Advanced Medical Directive Form, and understands to bring it in once completed. Health Maintenance Due   Topic Date Due    Shingrix Vaccine Age 49> (1 of 2) 06/09/1983          Well Visit, Over 72: Care Instructions  Your Care Instructions    Physical exams can help you stay healthy. Your doctor has checked your overall health and may have suggested ways to take good care of yourself. He or she also may have recommended tests. At home, you can help prevent illness with healthy eating, regular exercise, and other steps. Follow-up care is a key part of your treatment and safety. Be sure to make and go to all appointments, and call your doctor if you are having problems. It's also a good idea to know your test results and keep a list of the medicines you take. How can you care for yourself at home? · Reach and stay at a healthy weight. This will lower your risk for many problems, such as obesity, diabetes, heart disease, and high blood pressure. · Get at least 30 minutes of exercise on most days of the week. Walking is a good choice. You also may want to do other activities, such as running, swimming, cycling, or playing tennis or team sports. · Do not smoke. Smoking can make health problems worse. If you need help quitting, talk to your doctor about stop-smoking programs and medicines. These can increase your chances of quitting for good. · Protect your skin from too much sun. When you're outdoors from 10 a.m. to 4 p.m., stay in the shade or cover up with clothing and a hat with a wide brim. Wear sunglasses that block UV rays. Even when it's cloudy, put broad-spectrum sunscreen (SPF 30 or higher) on any exposed skin. · See a dentist one or two times a year for checkups and to have your teeth cleaned. · Wear a seat belt in the car. · Limit alcohol to 2 drinks a day for men and 1 drink a day for women. Too much alcohol can cause health problems.   Follow your doctor's advice about when to have certain tests. These tests can spot problems early. For men and women  · Cholesterol. Your doctor will tell you how often to have this done based on your overall health and other things that can increase your risk for heart attack and stroke. · Blood pressure. Have your blood pressure checked during a routine doctor visit. Your doctor will tell you how often to check your blood pressure based on your age, your blood pressure results, and other factors. · Diabetes. Ask your doctor whether you should have tests for diabetes. · Vision. Experts recommend that you have yearly exams for glaucoma and other age-related eye problems. · Hearing. Tell your doctor if you notice any change in your hearing. You can have tests to find out how well you hear. · Colon cancer tests. Keep having colon cancer tests as your doctor recommends. You can have one of several types of tests. · Heart attack and stroke risk. At least every 4 to 6 years, you should have your risk for heart attack and stroke assessed. Your doctor uses factors such as your age, blood pressure, cholesterol, and whether you smoke or have diabetes to show what your risk for a heart attack or stroke is over the next 10 years. · Osteoporosis. Talk to your doctor about whether you should have a bone density test to find out whether you have thinning bones. Also ask your doctor about whether you should take calcium and vitamin D supplements. For women  · Pap test and pelvic exam. You may no longer need a Pap test. Talk with your doctor about whether to stop or continue to have Pap tests. · Breast exam and mammogram. Ask how often you should have a mammogram, which is an X-ray of your breasts. A mammogram can spot breast cancer before it can be felt and when it is easiest to treat. · Thyroid disease.  Talk to your doctor about whether to have your thyroid checked as part of a regular physical exam. Women have an increased chance of a thyroid problem. For men  · Prostate exam. Talk to your doctor about whether you should have a blood test (called a PSA test) for prostate cancer. Experts disagree on whether men should have this test. Some experts recommend that you discuss the benefits and risks of the test with your doctor. · Abdominal aortic aneurysm. Ask your doctor whether you should have a test to check for an aneurysm. You may need a test if you ever smoked or if your parent, brother, sister, or child has had an aneurysm. When should you call for help? Watch closely for changes in your health, and be sure to contact your doctor if you have any problems or symptoms that concern you. Where can you learn more? Go to http://elmo-milly.info/. Enter O039 in the search box to learn more about \"Well Visit, Over 65: Care Instructions. \"  Current as of: March 29, 2018  Content Version: 11.8  © 2839-7561 [employer]. Care instructions adapted under license by [employer] (which disclaims liability or warranty for this information). If you have questions about a medical condition or this instruction, always ask your healthcare professional. [place] [address] any warranty or liability for your use of this information.

## 2018-12-24 NOTE — PROGRESS NOTES
Chief Complaint   Patient presents with   [address] admission 9-08-18 due to Fall had a Concussion, Fractured Pelvic Bone all this happened away from home and out of the area       1. Have you been to the ER, urgent care clinic since your last visit? Hospitalized since your last visit? Yes, When: 9-08-18 Where: [facility] facility Emergency Room with admission Reason: due to a Fall at home visiting her son    2. Have you seen or consulted any other health care providers outside of the [address] [place] [address] since your last visit? Include any pap smears or colon screening. No    Patient was given a copy of the Advanced Directive and understands to bring it in once completed.   Health Maintenance Due   Topic Date Due    Shingrix Vaccine Age 49> (1 of 2) 06/09/1983

## 2019-01-01 ENCOUNTER — APPOINTMENT (OUTPATIENT)
Dept: PHYSICAL THERAPY | Age: 84
End: 2019-01-01

## 2019-01-01 ENCOUNTER — OFFICE VISIT (OUTPATIENT)
Dept: NEUROLOGY | Age: 84
End: 2019-01-01

## 2019-01-01 ENCOUNTER — HOSPITAL ENCOUNTER (OUTPATIENT)
Dept: GENERAL RADIOLOGY | Age: 84
Discharge: HOME | End: 2019-06-25
Payer: MEDICARE

## 2019-01-01 ENCOUNTER — APPOINTMENT (OUTPATIENT)
Dept: PHYSICAL THERAPY | Age: 84
End: 2019-01-01
Payer: MEDICARE

## 2019-01-01 ENCOUNTER — OFFICE VISIT (OUTPATIENT)
Dept: CARDIOLOGY CLINIC | Age: 84
End: 2019-01-01

## 2019-01-01 ENCOUNTER — OFFICE VISIT (OUTPATIENT)
Dept: INTERNAL MEDICINE CLINIC | Age: 84
End: 2019-01-01

## 2019-01-01 ENCOUNTER — TELEPHONE (OUTPATIENT)
Dept: INTERNAL MEDICINE CLINIC | Age: 84
End: 2019-01-01

## 2019-01-01 ENCOUNTER — HOSPITAL ENCOUNTER (OUTPATIENT)
Dept: PHYSICAL THERAPY | Age: 84
Discharge: HOME | End: 2019-07-02
Payer: MEDICARE

## 2019-01-01 ENCOUNTER — HOSPITAL ENCOUNTER (OUTPATIENT)
Age: 84
Setting detail: OUTPATIENT SURGERY
Discharge: HOME | End: 2019-09-17
Attending: INTERNAL MEDICINE | Admitting: INTERNAL MEDICINE
Payer: MEDICARE

## 2019-01-01 ENCOUNTER — HOSPITAL ENCOUNTER (OUTPATIENT)
Dept: LAB | Age: 84
Discharge: HOME | End: 2019-06-25
Payer: MEDICARE

## 2019-01-01 VITALS
OXYGEN SATURATION: 98 % | HEIGHT: 61 IN | TEMPERATURE: 96.7 F | WEIGHT: 134.2 LBS | BODY MASS INDEX: 25.34 KG/M2 | HEART RATE: 90 BPM | DIASTOLIC BLOOD PRESSURE: 62 MMHG | SYSTOLIC BLOOD PRESSURE: 114 MMHG | RESPIRATION RATE: 12 BRPM

## 2019-01-01 VITALS
WEIGHT: 135 LBS | OXYGEN SATURATION: 97 % | HEIGHT: 62 IN | DIASTOLIC BLOOD PRESSURE: 64 MMHG | BODY MASS INDEX: 24.84 KG/M2 | SYSTOLIC BLOOD PRESSURE: 106 MMHG | HEART RATE: 102 BPM

## 2019-01-01 VITALS
HEIGHT: 61 IN | BODY MASS INDEX: 27.19 KG/M2 | WEIGHT: 144 LBS | DIASTOLIC BLOOD PRESSURE: 62 MMHG | HEART RATE: 85 BPM | SYSTOLIC BLOOD PRESSURE: 110 MMHG | TEMPERATURE: 98.6 F | RESPIRATION RATE: 18 BRPM | OXYGEN SATURATION: 96 %

## 2019-01-01 VITALS
SYSTOLIC BLOOD PRESSURE: 108 MMHG | DIASTOLIC BLOOD PRESSURE: 60 MMHG | HEIGHT: 61 IN | HEART RATE: 91 BPM | BODY MASS INDEX: 27.19 KG/M2 | WEIGHT: 144 LBS | OXYGEN SATURATION: 97 %

## 2019-01-01 VITALS
DIASTOLIC BLOOD PRESSURE: 55 MMHG | RESPIRATION RATE: 26 BRPM | BODY MASS INDEX: 24.29 KG/M2 | OXYGEN SATURATION: 99 % | TEMPERATURE: 98.3 F | WEIGHT: 132 LBS | HEART RATE: 86 BPM | SYSTOLIC BLOOD PRESSURE: 108 MMHG | HEIGHT: 62 IN

## 2019-01-01 VITALS
RESPIRATION RATE: 12 BRPM | OXYGEN SATURATION: 95 % | BODY MASS INDEX: 26.24 KG/M2 | WEIGHT: 139 LBS | TEMPERATURE: 97.1 F | DIASTOLIC BLOOD PRESSURE: 57 MMHG | HEIGHT: 61 IN | HEART RATE: 71 BPM | SYSTOLIC BLOOD PRESSURE: 105 MMHG

## 2019-01-01 DIAGNOSIS — M54.2 NECK PAIN: ICD-10-CM

## 2019-01-01 DIAGNOSIS — I10 ESSENTIAL HYPERTENSION: ICD-10-CM

## 2019-01-01 DIAGNOSIS — R19.7 DIARRHEA, UNSPECIFIED TYPE: ICD-10-CM

## 2019-01-01 DIAGNOSIS — D50.8 OTHER IRON DEFICIENCY ANEMIA: ICD-10-CM

## 2019-01-01 DIAGNOSIS — Z71.89 ADVANCE CARE PLANNING: ICD-10-CM

## 2019-01-01 DIAGNOSIS — Z23 ENCOUNTER FOR IMMUNIZATION: ICD-10-CM

## 2019-01-01 DIAGNOSIS — I48.19 PERSISTENT ATRIAL FIBRILLATION (HCC): Primary | ICD-10-CM

## 2019-01-01 DIAGNOSIS — E05.20 GOITER, TOXIC, MULTINODULAR: ICD-10-CM

## 2019-01-01 DIAGNOSIS — M06.9 RHEUMATOID ARTHRITIS INVOLVING MULTIPLE SITES, UNSPECIFIED RHEUMATOID FACTOR PRESENCE: ICD-10-CM

## 2019-01-01 DIAGNOSIS — R29.6 RECURRENT FALLS: ICD-10-CM

## 2019-01-01 DIAGNOSIS — M25.552 LEFT HIP PAIN: ICD-10-CM

## 2019-01-01 DIAGNOSIS — R29.6 FALLING EPISODES: ICD-10-CM

## 2019-01-01 DIAGNOSIS — E05.90 HYPERTHYROIDISM: ICD-10-CM

## 2019-01-01 DIAGNOSIS — F32.A DEPRESSION, UNSPECIFIED DEPRESSION TYPE: ICD-10-CM

## 2019-01-01 DIAGNOSIS — I67.9 CEREBROVASCULAR DISEASE: Primary | ICD-10-CM
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
## 2019-01-01 DIAGNOSIS — E78.00 HYPERCHOLESTEROLEMIA: ICD-10-CM

## 2019-01-01 DIAGNOSIS — F07.81 TRAUMATIC ENCEPHALOPATHY: ICD-10-CM

## 2019-01-01 DIAGNOSIS — F33.1 MODERATE EPISODE OF RECURRENT MAJOR DEPRESSIVE DISORDER (HCC): Primary | ICD-10-CM

## 2019-01-01 DIAGNOSIS — I50.9 CONGESTIVE HEART FAILURE, UNSPECIFIED HF CHRONICITY, UNSPECIFIED HEART FAILURE TYPE (HCC): Primary | ICD-10-CM

## 2019-01-01 DIAGNOSIS — F32.89 OTHER DEPRESSION: Primary | ICD-10-CM

## 2019-01-01 DIAGNOSIS — Z00.00 MEDICARE ANNUAL WELLNESS VISIT, SUBSEQUENT: ICD-10-CM

## 2019-01-01 DIAGNOSIS — F01.50 VASCULAR DEMENTIA WITHOUT BEHAVIORAL DISTURBANCE (HCC): ICD-10-CM

## 2019-01-01 DIAGNOSIS — D64.9 ANEMIA, UNSPECIFIED TYPE: ICD-10-CM

## 2019-01-01 DIAGNOSIS — F33.1 MODERATE EPISODE OF RECURRENT MAJOR DEPRESSIVE DISORDER (HCC): ICD-10-CM

## 2019-01-01 DIAGNOSIS — M85.80 OSTEOPENIA, UNSPECIFIED LOCATION: ICD-10-CM

## 2019-01-01 DIAGNOSIS — D47.2 MGUS (MONOCLONAL GAMMOPATHY OF UNKNOWN SIGNIFICANCE): ICD-10-CM

## 2019-01-01 DIAGNOSIS — I48.19 PERSISTENT ATRIAL FIBRILLATION (HCC): ICD-10-CM

## 2019-01-01 DIAGNOSIS — R39.15 URINARY URGENCY: ICD-10-CM

## 2019-01-01 DIAGNOSIS — K52.9 COLITIS: ICD-10-CM

## 2019-01-01 DIAGNOSIS — R19.7 DIARRHEA, UNSPECIFIED TYPE: Primary | ICD-10-CM

## 2019-01-01 LAB
ALBUMIN SERPL-MCNC: 3.1 G/DL (ref 3.4–5)
ALBUMIN/GLOB SERPL: 0.9 {RATIO} (ref 0.8–1.7)
ALP SERPL-CCNC: 130 U/L (ref 45–117)
ALT SERPL-CCNC: 8 U/L (ref 13–56)
ANION GAP SERPL CALC-SCNC: 10 MMOL/L (ref 3–18)
AST SERPL-CCNC: 9 U/L (ref 15–37)
BASOPHILS # BLD: 0 K/UL (ref 0–0.1)
BASOPHILS NFR BLD: 0 % (ref 0–2)
BILIRUB SERPL-MCNC: 0.4 MG/DL (ref 0.2–1)
BUN SERPL-MCNC: 14 MG/DL (ref 7–18)
BUN/CREAT SERPL: 16 (ref 12–20)
CALCIUM SERPL-MCNC: 8.8 MG/DL (ref 8.5–10.1)
CHLORIDE SERPL-SCNC: 107 MMOL/L (ref 100–108)
CO2 SERPL-SCNC: 22 MMOL/L (ref 21–32)
CREAT SERPL-MCNC: 0.86 MG/DL (ref 0.6–1.3)
DIFFERENTIAL METHOD BLD: ABNORMAL
EOSINOPHIL # BLD: 0 K/UL (ref 0–0.4)
EOSINOPHIL NFR BLD: 0 % (ref 0–5)
ERYTHROCYTE [DISTWIDTH] IN BLOOD BY AUTOMATED COUNT: 15.5 % (ref 11.6–14.5)
GLOBULIN SER CALC-MCNC: 3.5 G/DL (ref 2–4)
GLUCOSE SERPL-MCNC: 95 MG/DL (ref 74–99)
HCT VFR BLD AUTO: 31.7 % (ref 35–45)
HGB BLD-MCNC: 9.6 G/DL (ref 12–16)
LIPASE SERPL-CCNC: 111 U/L (ref 73–393)
LYMPHOCYTES # BLD: 0.5 K/UL (ref 0.9–3.6)
LYMPHOCYTES NFR BLD: 6 % (ref 21–52)
MCH RBC QN AUTO: 27.7 PG (ref 24–34)
MCHC RBC AUTO-ENTMCNC: 30.3 G/DL (ref 31–37)
MCV RBC AUTO: 91.4 FL (ref 74–97)
MONOCYTES # BLD: 0.4 K/UL (ref 0.05–1.2)
MONOCYTES NFR BLD: 5 % (ref 3–10)
NEUTS SEG # BLD: 7.1 K/UL (ref 1.8–8)
NEUTS SEG NFR BLD: 89 % (ref 40–73)
PLATELET # BLD AUTO: 334 K/UL (ref 135–420)
PMV BLD AUTO: 9.2 FL (ref 9.2–11.8)
POTASSIUM SERPL-SCNC: 5.2 MMOL/L (ref 3.5–5.5)
PROT SERPL-MCNC: 6.6 G/DL (ref 6.4–8.2)
RBC # BLD AUTO: 3.47 M/UL (ref 4.2–5.3)
SODIUM SERPL-SCNC: 139 MMOL/L (ref 136–145)
WBC # BLD AUTO: 8.1 K/UL (ref 4.6–13.2)

## 2019-01-01 PROCEDURE — 97161 PT EVAL LOW COMPLEX 20 MIN: CPT

## 2019-01-01 PROCEDURE — 80053 COMPREHEN METABOLIC PANEL: CPT

## 2019-01-01 PROCEDURE — 97535 SELF CARE MNGMENT TRAINING: CPT

## 2019-01-01 PROCEDURE — 76040000019: Performed by: INTERNAL MEDICINE

## 2019-01-01 PROCEDURE — 77030021593 HC FCPS BIOP ENDOSC BSC -A: Performed by: INTERNAL MEDICINE

## 2019-01-01 PROCEDURE — 88305 TISSUE EXAM BY PATHOLOGIST: CPT

## 2019-01-01 PROCEDURE — 36415 COLL VENOUS BLD VENIPUNCTURE: CPT

## 2019-01-01 PROCEDURE — 72052 X-RAY EXAM NECK SPINE 6/>VWS: CPT

## 2019-01-01 PROCEDURE — 85025 COMPLETE CBC W/AUTO DIFF WBC: CPT

## 2019-01-01 PROCEDURE — 83690 ASSAY OF LIPASE: CPT

## 2019-01-01 PROCEDURE — 97110 THERAPEUTIC EXERCISES: CPT

## 2019-01-01 RX ORDER — SODIUM CHLORIDE, SODIUM LACTATE, POTASSIUM CHLORIDE, CALCIUM CHLORIDE 600; 310; 30; 20 MG/100ML; MG/100ML; MG/100ML; MG/100ML
25 INJECTION, SOLUTION INTRAVENOUS CONTINUOUS
Status: DISCONTINUED | OUTPATIENT
Start: 2019-01-01 | End: 2019-01-01 | Stop reason: HOSPADM

## 2019-01-01 RX ORDER — SIMVASTATIN 10 MG/1
TABLET, FILM COATED ORAL
Qty: 90 TAB | Refills: 3 | Status: SHIPPED | OUTPATIENT
Start: 2019-01-01

## 2019-01-01 RX ORDER — LIDOCAINE 50 MG/G
PATCH TOPICAL EVERY 24 HOURS
COMMUNITY

## 2019-01-01 RX ORDER — VENLAFAXINE HYDROCHLORIDE 37.5 MG/1
37.5 CAPSULE, EXTENDED RELEASE ORAL DAILY
Qty: 90 CAP | Refills: 3 | Status: SHIPPED | OUTPATIENT
Start: 2019-01-01 | End: 2019-01-01

## 2019-01-01 RX ORDER — DILTIAZEM HYDROCHLORIDE 180 MG/1
CAPSULE, COATED, EXTENDED RELEASE ORAL
Qty: 90 CAP | Refills: 3 | Status: SHIPPED | OUTPATIENT
Start: 2019-01-01

## 2019-01-01 RX ORDER — LEVOTHYROXINE SODIUM 125 UG/1
150 TABLET ORAL
COMMUNITY

## 2019-01-01 RX ORDER — SULFASALAZINE 500 MG/1
500 TABLET ORAL 2 TIMES DAILY
COMMUNITY

## 2019-01-01 RX ORDER — SODIUM CHLORIDE 0.9 % (FLUSH) 0.9 %
5-40 SYRINGE (ML) INJECTION EVERY 8 HOURS
Status: DISCONTINUED | OUTPATIENT
Start: 2019-01-01 | End: 2019-01-01 | Stop reason: HOSPADM

## 2019-01-01 RX ORDER — SODIUM CHLORIDE 0.9 % (FLUSH) 0.9 %
5-40 SYRINGE (ML) INJECTION AS NEEDED
Status: DISCONTINUED | OUTPATIENT
Start: 2019-01-01 | End: 2019-01-01 | Stop reason: HOSPADM

## 2019-01-01 RX ORDER — SERTRALINE HYDROCHLORIDE 100 MG/1
100 TABLET, FILM COATED ORAL DAILY
Qty: 90 TAB | Refills: 1 | Status: SHIPPED | OUTPATIENT
Start: 2019-01-01

## 2019-01-01 RX ORDER — FUROSEMIDE 20 MG/1
20 TABLET ORAL DAILY
COMMUNITY

## 2019-01-01 RX ORDER — VENLAFAXINE HYDROCHLORIDE 37.5 MG/1
37.5 CAPSULE, EXTENDED RELEASE ORAL DAILY
Qty: 30 CAP | Refills: 11 | Status: SHIPPED | COMMUNITY
Start: 2019-01-01 | End: 2019-01-01 | Stop reason: SDUPTHER

## 2019-01-01 RX ORDER — LIDOCAINE HYDROCHLORIDE 10 MG/ML
0.1 INJECTION, SOLUTION EPIDURAL; INFILTRATION; INTRACAUDAL; PERINEURAL AS NEEDED
Status: DISCONTINUED | OUTPATIENT
Start: 2019-01-01 | End: 2019-01-01 | Stop reason: HOSPADM

## 2019-02-18 NOTE — PROGRESS NOTES
Chief Complaint Patient presents with  Neurologic Problem Aneurysm while in hospital.  
 
Fall Risk Assessment, last 12 mths 2/18/2019 Able to walk? Yes Fall in past 12 months? Yes Fall with injury? Yes  
Number of falls in past 12 months 2 Fall Risk Score 3

## 2019-02-18 NOTE — PROGRESS NOTES
HPI: 71-year-old female coming with a chief complaint of cerebral aneurysm. She is being referred by Dr. Charlette Rodriguez. She comes with her daughter. In September 2018 she had a fall in which she suffered a skull fracture. Imaging at that time showed evidence of a left occipital skull fracture with a left cerebellar contusion with evidence of intraventricular blood in the occipital horns. In addition to this that MRI and however neuroimaging studies done over the last 2 years have shown combinations of a small hygromas that appeared to be of different ages, on the bilateral cerebral hemispheres, without any significant mass-effect. She also has a significant amount of diffuse microangiopathy and cortical atrophy. She has areas of left occipital encephalomalacia. Incidentally she had a right MCA bifurcation 5 mm aneurysm seen on imaging during her stay in September. She also has had a significant amount of hearing loss particularly on the left ear since then. In addition to this, she has had a long-standing history of recurrent falls. She had a prior tibial/fibular fracture and a pelvic fracture back in 2016 as a result of falls. She has had problems with cognition, memory, and walking for several years but these have gotten increasingly worse with her aging and repeated incidents of falling and multisystem body injuries. In July of last year she had a gastrointestinal bleed. At that time family insisted on stopping oral anticoagulations, which she did. Her  actually passed away after a marriage of 79 years in August from a brain bleed, and then in September patient presented herself with the above described brain hemorrhage. She has history of obstructive sleep apnea, had a sleep study last year, but daughter as well as patient states there is no way that she will use CPAP. They have a philosophy that they would not like to be bothered with so many doctors  visits/appointments and tests.   She lives alone at home, she has had care which is currently in transition. She walks around with a walker. She is in a one level home. Social History Socioeconomic History  Marital status:  Spouse name: Not on file  Number of children: Not on file  Years of education: Not on file  Highest education level: Not on file Social Needs  Financial resource strain: Not on file  Food insecurity - worry: Not on file  Food insecurity - inability: Not on file  Transportation needs - medical: Not on file  Transportation needs - non-medical: Not on file Occupational History  Not on file Tobacco Use  Smoking status: Former Smoker Years: 45.00 Types: Cigarettes Last attempt to quit: 2018 Years since quittin.4  Smokeless tobacco: Never Used Substance and Sexual Activity  Alcohol use: Yes Alcohol/week: 0.0 oz  
  Comment: occasionally  Drug use: No  
 Sexual activity: Not on file Other Topics Concern  Not on file Social History Narrative  Not on file Family History Problem Relation Age of Onset  Hypertension Mother  Heart Disease Mother  Osteoporosis Mother  Alcohol abuse Father Current Outpatient Medications Medication Sig Dispense Refill  sulfaSALAzine (AZULFIDINE) 500 mg tablet Take 500 mg by mouth two (2) times a day.  prednisoLONE 5 mg tab Take  by mouth daily (with breakfast).  levothyroxine (SYNTHROID) 125 mcg tablet Take  by mouth Daily (before breakfast).  cyclobenzaprine HCl (FLEXERIL PO) Take  by mouth.  lidocaine (LIDODERM) 5 % by TransDERmal route every twenty-four (24) hours. Apply patch to the affected area for 12 hours a day and remove for 12 hours a day.  omeprazole (PRILOSEC) 40 mg capsule Take 1 Cap by mouth daily. 90 Cap 0  
 losartan (COZAAR) 25 mg tablet Take 1 Tab by mouth daily.  90 Tab 3  
  dilTIAZem CD (CARDIZEM CD) 180 mg ER capsule TAKE 1 CAPSULE DAILY 90 Cap 3  
 sertraline (ZOLOFT) 100 mg tablet Take 1 Tab by mouth daily. 90 Tab 3  
 simvastatin (ZOCOR) 10 mg tablet Take 1 Tab by mouth nightly. 90 Tab 3  
 VITAMIN D2 50,000 unit capsule Take 50,000 Units by mouth two (2) times a week.  ascorbic acid, vitamin C, (VITAMIN C) 500 mg tablet Take 1,000 mg by mouth daily.  GENTAK 0.3 % (3 mg/gram) ophthalmic ointment   3  
 tobramycin (TOBREX) 0.3 % ophthalmic solution  furosemide (LASIX) 40 mg tablet TAKE 1 TABLET DAILY FOR EDEMA (Patient taking differently: TAKE 1 TABLET BY MOUTH EVERY OTHER DAY FOR EDEMA) 90 Tab 3  celecoxib (CELEBREX) 200 mg capsule Take 1 Cap by mouth daily as needed. 90 Cap 3  
 REFRESH TEARS 0.5 % drop ophthalmic solution Apply 1 Drop to eye two (2) times a day.  prednisoLONE acetate (PRED FORTE) 1 % ophthalmic suspension Administer 1 Drop to right eye two (2) times a day.  hydroxychloroquine (PLAQUENIL) 200 mg tablet Take 200 mg by mouth two (2) times a day.  cycloSPORINE (RESTASIS) 0.05 % ophthalmic emulsion Administer 1 Drop to both eyes two (2) times a day.  peg 400-propylene glycol (SYSTANE) 0.4-0.3 % Drop Administer 1 Drop to right eye as needed.  methimazole (TAPAZOLE) 5 mg tablet Take 2.5 mg by mouth nightly. Past Medical History:  
Diagnosis Date  Acute gastric ulcer without mention of hemorrhage, perforation, or obstruction 1989  
 surg repair  Anemia  Arrhythmia  Atrial fibrillation ([facility] [address] 12/14/2011  Cataract 8-2001 RIGHT  Depression 4-  Edema leg RIGHT   
 NORMAL VENOUS DOPPLER  
 Goiter Nodular 7-30-01  
 History of echocardiogram 04/20/2015 EF 50-55%. No RWMA. Mild LVH. Indeterminate diastolic fx. RVSP 40 mmHg. Severe LAE. Mild JENNY. Mild MVP of anterior leaflet. Mod MR w/2 jets. Mod TR. No significant chg from study of 6/26/14.  History of Holter monitoring 01/09/2012 Well controlled atrial fibrillation. Avg HR 83 bpm.    
 Hypercholesterolemia  Hypertension  Hyperthyroidism 12/14/2011  Insomnia 6-18-09  Leg swelling  Lower extremity venous duplex 12/18/2013 Right leg:  No venous thrombosis.  Multinodular goiter  Osteopenia 8/27/2010  Osteoporosis 2-2009  
 s/p reclast rx  Otitis media  Retina, separation 2004  
 detached retina  Rheumatoid arthritis(714.0)   
 rheumatoid  Septic arthritis [facility]) May 2015 R knee following with Ortho and ID  Skin cancer (melanoma) ([address] 03/2013  Symptomatic menopausal or female climacteric states  Thyroid disease  Tuberculin test reaction Past Surgical History:  
Procedure Laterality Date  HX BLEPHAROPLASTY  HX HEENT    
 hx detached retina  HX HIP REPLACEMENT Left  HX KNEE ARTHROSCOPY    
 scheduled for replacement but could not do due to infection  HX MOHS PROCEDURES  [id number] LEFT  
 HX ORTHOPAEDIC    
 right leg sx  HX SKIN BIOPSY  04/05/2013  
 malignant melanoma [address]  FL COLONOSCOPY FLX DX W/COLLJ SPEC WHEN PFRMD  8-2001  
 polyp(tubular adenoma); Dr Ybarra Standing  FL COLONOSCOPY W/BIOPSY SINGLE/MULTIPLE  5-  
 (+)polyp (bx: tubular adenoma) Dr. Ybarra Standing Allergies Allergen Reactions  Etanercept Other (comments) Caused bad infection behind knee 5/2015 Caused bad infection behind knee 5/2015 Caused bad infection behind knee 5/2015 Caused bad infection behind knee 5/2015 Caused bad infection behind knee 5/2015 Caused bad infection behind knee 5/2015  Morphine Nausea and Vomiting and Other (comments) N&V 
N&V 
vomitting N&V  
 Haloperidol Other (comments) Hallucinations, agitation, increased BP Hallucinations, agitation, increased BP  
 Haloperidol Lactate Other (comments)   Hallucinations, agitation, increased BP 
 Hallucinations, agitation, increased BP Patient Active Problem List  
Diagnosis Code  
 HTN (hypertension) I10  
 Hypercholesterolemia E78.00  Gastritis K29.70  
 Osteopenia M85.80  RA (rheumatoid arthritis) ([facility]) M06.9  Hyperthyroidism E05.90  
 Long term current use of anticoagulant therapy Z79.01  
 Atrial fibrillation ([facility]) I48.91  
 Depression F32.9  
 Goiter, toxic, multinodular E05.20  Mitral valve disorder I05.9  Osteoarthritis of right knee M17.11  
 Iron deficiency anemia D50.9  MGUS (monoclonal gammopathy of unknown significance) D47.2  Cerebral aneurysm I67.1  Tricuspid regurgitation I07.1  Pulmonary hypertension ([facility]) I27.20 Review of Systems:  
Unobtainable PHYSICAL EXAMINATION:   
 
VITAL SIGNS:   
Visit Vitals /62 (BP 1 Location: Left arm, BP Patient Position: Sitting) Pulse 85 Temp 98.6 °F (37 °C) Resp 18 Ht 5' 1\" (1.549 m) Wt 65.3 kg (144 lb) SpO2 96% BMI 27.21 kg/m² GENERAL: Well developed, well nourished, in no apparent distress. HEART: RR, no murmurs heard, no carotid bruits EXTREMITIES: No clubbing, cyanosis, or edema is identified. Pulses 2+    and symmetrical. 
HEAD:   Normocephalic, atraumatic. NEUROLOGIC EXAMINATION 
 
MENTAL STATUS: Awake, alert, oriented originally to September, then corrects to March, she knows it is Monday, cannot tell me the month, tell me with a lobulated that it is 2018. She thinks that she is in [place]. Her attention is greatly diminished. She has 0 out of 3 recall in 5 minutes. She is unable to tell me how many quarters there are in $1.25 and unable to do serial sevens. She follows simple commands with some difficulties. Fund of knowledge is reduced. Mood and affect are appropriate CRANIAL NERVES: Visual fields are suboptimally tested, unable to see fundi, right pupil shows a dense cataract, left pupil is reactive to light.  EOMs appear full, but she has poor tracking because of poor vision. Facial sensation cannot be reliably assessed, face is symmetrical otherwise, hearing is markedly decreased. Hearing is present. SCM/TPZ 5/5 Palate rises symmetrically. Tongue is in the midline. MOTOR:  5/5 in all four limbs without lateralizing weakness CEREBELLAR: No tremors SENSORY: Unreliable DTR's: Trace throughout, no long tract signs GAIT:   Slow, apractic, uses a walker I have personally reviewed imaging studies. Impression/Plan: Advanced cerebrovascular and traumatic brain disease, with a resultant substantial apraxia of gait and substantial cognitive impairment. We first discussed the philosophy of care. There are several things downgoing. She has obstructive sleep apnea but she does not want this treated. Her right MCA aneurysm is 5 mm and in the anterior circulation, I think the chances that this is going to result in rupture her very low during her lifetime. They I am not advocating any intervention for this at this point, and we agreed that therefore no imaging was not change the management and that therefore I do not see the need to pursue routine/neuroimaging for this aneurysm. I did discuss the potential risk of rupture and the consequences and they understand these. She certainly is an ongoing very high fall risk and should not be on any kind of oral anticoagulants. She clearly has a significant dementia that is a combination of repeated traumatic brain injuries as well as cerebrovascular in nature. The advanced nature of her dementia but he is a low likelihood of response to cognitive enhancers, which they are not interested in. Therefore, at this point I do not recommend any additional neurological workup or other interventions.   I did discuss fall risks with patient and daughter as well as the risks she is running by staying alone during the part of the day, something that they are trying to arrange care for. They will return to see me as needed. PLEASE NOTE:  
Portions of this document may have been produced using voice recognition software. Unrecognized errors in transcription may be present. This note will not be viewable in [address]

## 2019-02-18 NOTE — PATIENT INSTRUCTIONS
Thank you for choosing [facility] and [facility] for your  
 
care. You may receive a survey about your visit. We appreciate you taking time  
 
to complete this survey as we use your feedback to improve our services. We  
 
realize we are not perfect, but we strive to provide excellent care. Preventing Falls: After Your Visit Your Care Instructions Getting around your home safely can be a challenge if you have injuries or health problems that make it easy for you to fall. Loose rugs and furniture in walkways are among the dangers for many older people who have problems walking or who have poor eyesight. People who have conditions such as arthritis, osteoporosis, or dementia also have to be careful not to fall. You can make your home safer with a few simple measures. Follow-up care is a key part of your treatment and safety. Be sure to make and go to all appointments, and call your doctor if you are having problems. It's also a good idea to know your test results and keep a list of the medicines you take. How can you care for yourself at home? Taking care of yourself You may get dizzy if you do not drink enough water. To prevent dehydration, drink plenty of fluids, enough so that your urine is light yellow or clear like water. Choose water and other caffeine-free clear liquids. If you have kidney, heart, or liver disease and have to limit fluids, talk with your doctor before you increase the amount of fluids you drink. Exercise regularly to improve your strength, muscle tone, and balance. Walk if you can. Swimming may be a good choice if you cannot walk easily. Have your vision and hearing checked each year or any time you notice a change. If you have trouble seeing and hearing, you might not be able to avoid objects and could lose your balance. Know the side effects of the medicines you take.  Ask your doctor or pharmacist whether the medicines you take can affect your balance. Sleeping pills or sedatives can affect your balance. Limit the amount of alcohol you drink. Alcohol can impair your balance and other senses. Ask your doctor whether calluses or corns on your feet need to be removed. If you wear loose-fitting shoes because of calluses or corns, you can lose your balance and fall. Talk to your doctor if you have numbness in your feet. Preventing falls at home Remove raised doorway thresholds, throw rugs, and clutter. Repair loose carpet or raised areas in the floor. Move furniture and electrical cords to keep them out of walking paths. Use nonskid floor wax, and wipe up spills right away, especially on ceramic tile floors. If you use a walker or cane, put rubber tips on it. If you use crutches, clean the bottoms of them regularly with an abrasive pad, such as steel wool. Keep your house well lit, especially stairways, porches, and outside walkways. Use night-lights in areas such as hallways and bathrooms. Add extra light switches or use remote switches (such as switches that go on or off when you clap your hands) to make it easier to turn lights on if you have to get up during the night. Install sturdy handrails on stairways. Move items in your cabinets so that the things you use a lot are on the lower shelves (about waist level). Keep a cordless phone and a flashlight with new batteries by your bed. If possible, put a phone in each of the main rooms of your house, or carry a cell phone in case you fall and cannot reach a phone. Or, you can wear a device around your neck or wrist. You push a button that sends a signal for help. Wear low-heeled shoes that fit well and give your feet good support. Use footwear with nonskid soles. Check the heels and soles of your shoes for wear. Repair or replace worn heels or soles. Do not wear socks without shoes on wood floors. Walk on the grass when the sidewalks are slippery. If you live in an area that gets snow and ice in the winter, sprinkle salt on slippery steps and sidewalks. Preventing falls in the bath Install grab bars and nonskid mats inside and outside your shower or tub and near the toilet and sinks. Use shower chairs and bath benches. Use a hand-held shower head that will allow you to sit while showering. Get into a tub or shower by putting the weaker leg in first. Get out of a tub or shower with your strong side first.  
Repair loose toilet seats and consider installing a raised toilet seat to make getting on and off the toilet easier. Keep your bathroom door unlocked while you are in the shower. Where can you learn more? Go to [facility] Enter [phone] in the search box to learn more about \"Preventing Falls: After Your Visit. \"   
© 7798-5251 [employer]. Care instructions adapted under license by [employer] (which disclaims liability or warranty for this information). This care instruction is for use with your licensed healthcare professional. If you have questions about a medical condition or this instruction, always ask your healthcare professional. [place] [address] any warranty or liability for your use of this information. Content Version: 10.1.555152; Current as of: May 15, 2013

## 2019-04-29 NOTE — PROGRESS NOTES
HISTORY OF PRESENT ILLNESS  Syed Pérez is a 80 y.o. female. Patient presents for a follow-up office visit. She was originally referred here for new onset atrial fibrillation. Patient has a past medical history significant for hypertension, dyslipidemia, long-standing rheumatoid arthritis, and recently diagnosed hyperthyroidism. The patient was started on anticoagulation initially with warfarin and then switched to Pradaxa. She has been managed with rate controlling agents. She was initially started on metoprolol for rate control, however this was then switched to verapamil, because she could not tolerate the beta blocker due to to extreme fatigue. However, she is now managed with diltiazem for rate control. A follow-up echocardiogram in September 2016 showed low normal left ventricular ejection fraction 50-55% with severe left atrial enlargement and mild pulmonary hypertension, PASP 40 mmHg. The patient's anticoagulation was stopped in July 2018 when she developed acute blood loss anemia presumed GI in nature due to AVMs. She did require 2 units of packed red blood cells. In September 2018 she suffered a fall down the stairs in her garage in [place] when she was visiting her son, suffering a sacral fracture and a skull fracture and a small intracranial hemorrhage in the cerebellum. She has returned to the area to live back home. She does not remember much of the episode, but feels she tripped and fell down the stairs. Patient still reports fairly frequent falls, usually mild, but at least several each month. She has sustained mild lacerations, but no major injuries. She also complains of intermittent leg swelling at the end of the day but this will usually improve when she elevates her legs. This will also worsen if she stops taking her Lasix, so she will take this every other day.     Past Medical History:   Diagnosis Date    Acute gastric ulcer without mention of hemorrhage, perforation, or obstruction 1989    surg repair    Anemia     Arrhythmia     Atrial fibrillation ([facility] [address] 12/14/2011    Cataract 8-2001    RIGHT    Depression 4-    Edema leg RIGHT     NORMAL VENOUS DOPPLER    Goiter Nodular 7-30-01    History of echocardiogram 04/20/2015    EF 50-55%. No RWMA. Mild LVH. Indeterminate diastolic fx. RVSP 40 mmHg. Severe LAE. Mild JENNY. Mild MVP of anterior leaflet. Mod MR w/2 jets. Mod TR. No significant chg from study of 6/26/14.  History of Holter monitoring 01/09/2012    Well controlled atrial fibrillation. Avg HR 83 bpm.      Hypercholesterolemia     Hypertension     Hyperthyroidism 12/14/2011    Insomnia 6-18-09    Leg swelling     Lower extremity venous duplex 12/18/2013    Right leg:  No venous thrombosis.  Multinodular goiter     Osteopenia 8/27/2010    Osteoporosis 2-2009    s/p reclast rx    Otitis media     Retina, separation 2004    detached retina    Rheumatoid arthritis(714.0)     rheumatoid    Septic arthritis [facility]) May 2015    R knee following with Ortho and ID    Skin cancer (melanoma) ([facility] [address] 03/2013    Symptomatic menopausal or female climacteric states     Thyroid disease     Tuberculin test reaction       Current Outpatient Medications   Medication Sig Dispense Refill    simvastatin (ZOCOR) 10 mg tablet TAKE 1 TABLET NIGHTLY 90 Tab 3    omeprazole (PRILOSEC) 40 mg capsule TAKE 1 CAPSULE DAILY 90 Cap 3    sulfaSALAzine (AZULFIDINE) 500 mg tablet Take 500 mg by mouth two (2) times a day.  prednisoLONE 5 mg tab Take  by mouth daily (with breakfast).  levothyroxine (SYNTHROID) 125 mcg tablet Take 150 mcg by mouth Daily (before breakfast).  cyclobenzaprine HCl (FLEXERIL PO) Take  by mouth.  lidocaine (LIDODERM) 5 % by TransDERmal route every twenty-four (24) hours. Apply patch to the affected area for 12 hours a day and remove for 12 hours a day.       dilTIAZem CD (CARDIZEM CD) 180 mg ER capsule TAKE 1 CAPSULE DAILY 90 Cap 3    sertraline (ZOLOFT) 100 mg tablet Take 1 Tab by mouth daily. 90 Tab 3    VITAMIN D2 50,000 unit capsule Take 50,000 Units by mouth two (2) times a week.  ascorbic acid, vitamin C, (VITAMIN C) 500 mg tablet Take 1,000 mg by mouth daily.  GENTAK 0.3 % (3 mg/gram) ophthalmic ointment   3    tobramycin (TOBREX) 0.3 % ophthalmic solution       furosemide (LASIX) 40 mg tablet TAKE 1 TABLET DAILY FOR EDEMA (Patient taking differently: TAKE 1 TABLET BY MOUTH EVERY OTHER DAY FOR EDEMA) 90 Tab 3    celecoxib (CELEBREX) 200 mg capsule Take 1 Cap by mouth daily as needed. 90 Cap 3    REFRESH TEARS 0.5 % drop ophthalmic solution Apply 1 Drop to eye two (2) times a day.  prednisoLONE acetate (PRED FORTE) 1 % ophthalmic suspension Administer 1 Drop to right eye two (2) times a day.  hydroxychloroquine (PLAQUENIL) 200 mg tablet Take 200 mg by mouth two (2) times a day.  cycloSPORINE (RESTASIS) 0.05 % ophthalmic emulsion Administer 1 Drop to both eyes two (2) times a day.  peg 400-propylene glycol (SYSTANE) 0.4-0.3 % Drop Administer 1 Drop to right eye as needed.  methimazole (TAPAZOLE) 5 mg tablet Take 2.5 mg by mouth nightly.            Allergies   Allergen Reactions    Etanercept Other (comments)     Caused bad infection behind knee 5/2015  Caused bad infection behind knee 5/2015  Caused bad infection behind knee 5/2015  Caused bad infection behind knee 5/2015  Caused bad infection behind knee 5/2015  Caused bad infection behind knee 5/2015      Morphine Nausea and Vomiting and Other (comments)     N&V  N&V  vomitting  N&V    Haloperidol Other (comments)     Hallucinations, agitation, increased BP  Hallucinations, agitation, increased BP    Haloperidol Lactate Other (comments)     Hallucinations, agitation, increased BP  Hallucinations, agitation, increased BP        Social History     Tobacco Use    Smoking status: Former Smoker     Years: 45.00     Types: Cigarettes     Last attempt to quit: 2018     Years since quittin.6    Smokeless tobacco: Never Used   Substance Use Topics    Alcohol use: Yes     Alcohol/week: 0.0 oz     Comment: occasionally    Drug use: No         Review of Systems   Constitutional: Negative for chills, fever and weight loss. HENT: Negative for nosebleeds. Eyes: Negative for blurred vision and double vision. Respiratory: Negative for cough, shortness of breath and wheezing. Cardiovascular: Negative for chest pain, palpitations, orthopnea, claudication, leg swelling and PND. Gastrointestinal: Negative for abdominal pain, heartburn, nausea and vomiting. Genitourinary: Negative for dysuria and hematuria. Musculoskeletal: Positive for falls. Negative for joint pain and myalgias. Skin: Negative for rash. Neurological: Positive for dizziness. Negative for focal weakness and headaches. Endo/Heme/Allergies: Does not bruise/bleed easily. Psychiatric/Behavioral: Negative for substance abuse. Visit Vitals  /60   Pulse 91   Ht 5' 1\" (1.549 m)   Wt 65.3 kg (144 lb)   SpO2 97%   BMI 27.21 kg/m²      Physical Exam   Constitutional: She is oriented to person, place, and time. She appears well-developed and well-nourished. HENT:   Head: Normocephalic and atraumatic. Eyes: Conjunctivae are normal.   Neck: Neck supple. No JVD present. Carotid bruit is present ( Right). Cardiovascular: S1 normal, S2 normal and normal pulses. An irregularly irregular rhythm present. Exam reveals no gallop. Murmur heard. High-pitched blowing holosystolic murmur is present with a grade of 2/6 at the apex. Pulmonary/Chest: Effort normal and breath sounds normal. She has no wheezes. She has no rales. Abdominal: Soft. Bowel sounds are normal. There is no tenderness. Musculoskeletal: She exhibits no edema. Neurological: She is alert and oriented to person, place, and time. Skin: Skin is warm and dry.      EKG: Atrial fibrillation, normal axis, heart rate in the 80s,, poor R-wave progression, nonspecific ST abnormality. No change compared to the previous EKG. ASSESSMENT and PLAN    Persistent atrial fibrillation. Initially diagnosed in December 2011, which now appears to be persistent in nature. Her anticoagulation was stopped in July 2018 due to acute blood loss anemia presumed from small bowel AVMs. She did require 2 units of blood at that time. More recently she suffered a traumatic head injury following a fall where she fractured her skull. She is no longer a candidate for anticoagulation. She is still falling fairly frequently. Her heart rates appear well-controlled on Cardizem, which I would continue. Hypertension. Patient's blood pressures been running on the low side. I recommend she stop her losartan altogether. She can continue with diltiazem as monotherapy. Dyslipidemia. Patient is on simvastatin 10 mg daily. This is followed by her PCP. Dependent edema. This has been managed with Lasix every other day. Her blood pressure  becomes lower dizziness more severe, this may need to be stopped altogether. Right carotid bruit. patient did not have any significant carotid artery disease on a recent duplex scan in November 2017. No further workup needed. Followup in 12 months, sooner if needed.

## 2019-04-29 NOTE — PROGRESS NOTES
Burke Roach presents today for   Chief Complaint   Patient presents with    Irregular Heart Beat     6 month follow up       Burke Roach preferred language for health care discussion is english/other. Is someone accompanying this pt? Yes, her daughter    Is the patient using any DME equipment during [address]? no    Depression Screening:  3 most recent PHQ Screens 12/24/2018   Little interest or pleasure in doing things Not at all   Feeling down, depressed, irritable, or hopeless Not at all   Total Score PHQ 2 0       Learning Assessment:  Learning Assessment 4/29/2019   PRIMARY LEARNER Patient   HIGHEST LEVEL OF EDUCATION - PRIMARY LEARNER  GRADUATED HIGH SCHOOL OR GED   BARRIERS PRIMARY LEARNER NONE   CO-LEARNER CAREGIVER No   PRIMARY LANGUAGE ENGLISH   LEARNER PREFERENCE PRIMARY READING   ANSWERED BY self   RELATIONSHIP SELF       Abuse Screening:  Abuse Screening Questionnaire 4/29/2019   Do you ever feel afraid of your partner? N   Are you in a relationship with someone who physically or mentally threatens you? N   Is it safe for you to go home? Y       Fall Risk  Fall Risk Assessment, last 12 mths 2/18/2019   Able to walk? Yes   Fall in past 12 months? Yes   Fall with injury? Yes   Number of falls in past 12 months 2   Fall Risk Score 3       Pt currently taking Anticoagulant therapy?no    Coordination of Care:  1. Have you been to the ER, urgent care clinic since your last visit? Hospitalized since your last visit? no    2. Have you seen or consulted any other health care providers outside of the [address] [place] [address] since your last visit? Include any pap smears or colon screening.  no

## 2019-06-25 NOTE — PROGRESS NOTES
Chief Complaint Patient presents with [facility] Annual Wellness Visit ROOM 2  
 
 
1. Have you been to the ER, urgent care clinic since your last visit? Hospitalized since your last visit? No 
 
2. Have you seen or consulted any other health care providers outside of the [address] [place] [address] since your last visit? Include any pap smears or colon screening. No 
 
Patient was given a copy of the Advanced Directive and understands to bring it in once completed. Health Maintenance Due Topic Date Due  Shingrix Vaccine Age 50> (1 of 2) 06/09/1983  Pneumococcal 65+ years (2 of 2 - PCV13) 12/28/2015  MEDICARE YEARLY EXAM  02/23/2019 This is the Subsequent Medicare Annual Wellness Exam, performed 12 months or more after the Initial AWV or the last Subsequent AWV I have reviewed the patient's medical history in detail and updated the computerized patient record. History Pt is an 81yo female with h/o iron deficiency, DJD, mitral valve disease, goiter, depression, dementia, AF, right MCA aneurysm, ARMANDO (not using CPAP rx), hyperthyroidism, HTN, rheumatoid arthritis, gastritis, MGUS, and osteopenia/osteoporosis. Health Maintenance History Immunizations reviewed: Tdap up to date Pneumovax: over-due Flu: Flu season is over at present Zoster: over-due Immunization History Administered Date(s) Administered  (RETIRED) Pneumococcal Vaccine (Unspecified Type) 11/21/1994, 08/09/2012  Influenza High Dose Vaccine PF 10/16/2017  Influenza Nasal Vaccine 11/30/2018  Influenza Vaccine 10/09/2014  Influenza Vaccine (Quad) PF 10/17/2016  Influenza Vaccine PF 10/09/2013  Influenza Vaccine Split 11/01/2010  Influenza Vaccine Whole 10/11/2012  Tdap 05/27/2016 Colonoscopy: No hematochezia or melena. Eye exam: She continues to get regular eye exams. Mammo: No breast pain or masses. Dexascan: Up-to-date.   She plans Prolia injections soon per her Rheumatology team. 
 Pelvic/Pap: No vaginal bleeding. Past Medical History:  
Diagnosis Date  Acute gastric ulcer without mention of hemorrhage, perforation, or obstruction 1989  
 surg repair  Anemia  Arrhythmia  Atrial fibrillation ([facility] [address] 12/14/2011  Cataract 8-2001 RIGHT  Depression 4-  Edema leg RIGHT   
 NORMAL VENOUS DOPPLER  
 Goiter Nodular 7-30-01  
 History of echocardiogram 04/20/2015 EF 50-55%. No RWMA. Mild LVH. Indeterminate diastolic fx. RVSP 40 mmHg. Severe LAE. Mild JENNY. Mild MVP of anterior leaflet. Mod MR w/2 jets. Mod TR. No significant chg from study of 6/26/14.  History of Holter monitoring 01/09/2012 Well controlled atrial fibrillation. Avg HR 83 bpm.    
 Hypercholesterolemia  Hypertension  Hyperthyroidism 12/14/2011  Insomnia 6-18-09  Leg swelling  Lower extremity venous duplex 12/18/2013 Right leg:  No venous thrombosis.  Multinodular goiter  Osteopenia 8/27/2010  Osteoporosis 2-2009  
 s/p reclast rx  Otitis media  Retina, separation 2004  
 detached retina  Rheumatoid arthritis(714.0)   
 rheumatoid  Septic arthritis [facility]) May 2015 R knee following with Ortho and ID  Skin cancer (melanoma) ([facility] [address] 03/2013  Symptomatic menopausal or female climacteric states  Thyroid disease  Tuberculin test reaction Past Surgical History:  
Procedure Laterality Date  HX BLEPHAROPLASTY  HX HEENT    
 hx detached retina  HX HIP REPLACEMENT Left  HX KNEE ARTHROSCOPY    
 scheduled for replacement but could not do due to infection  HX MOHS PROCEDURES  [id number] LEFT  
 HX ORTHOPAEDIC    
 right leg sx  HX SKIN BIOPSY  04/05/2013  
 malignant melanoma [address]  MD COLONOSCOPY FLX DX W/COLLJ SPEC WHEN PFRMD  8-2001  
 polyp(tubular adenoma); Dr Ezekiel Alcaraz  MD COLONOSCOPY W/BIOPSY SINGLE/MULTIPLE  5-  
 (+)polyp (bx: tubular adenoma) Dr. Ezekiel Alcaraz Current Outpatient Medications Medication Sig Dispense Refill  venlafaxine-SR (EFFEXOR-XR) 37.5 mg capsule Take 1 Cap by mouth daily. 30 Cap 11  
 simvastatin (ZOCOR) 10 mg tablet TAKE 1 TABLET NIGHTLY 90 Tab 3  
 omeprazole (PRILOSEC) 40 mg capsule TAKE 1 CAPSULE DAILY 90 Cap 3  
 sulfaSALAzine (AZULFIDINE) 500 mg tablet Take 500 mg by mouth two (2) times a day.  prednisoLONE 5 mg tab Take  by mouth every other day. Indications: takes QOD  levothyroxine (SYNTHROID) 125 mcg tablet Take 150 mcg by mouth Daily (before breakfast).  cyclobenzaprine HCl (FLEXERIL PO) Take 10 mg by mouth. Indications: as needed  lidocaine (LIDODERM) 5 % by TransDERmal route every twenty-four (24) hours. Apply patch to the affected area for 12 hours a day and remove for 12 hours a day.  dilTIAZem CD (CARDIZEM CD) 180 mg ER capsule TAKE 1 CAPSULE DAILY 90 Cap 3  
 VITAMIN D2 50,000 unit capsule Take 50,000 Units by mouth two (2) times a week.  GENTAK 0.3 % (3 mg/gram) ophthalmic ointment Administer 0.5 Inches to right eye nightly. 3  
 celecoxib (CELEBREX) 200 mg capsule Take 1 Cap by mouth daily as needed. 90 Cap 3  
 REFRESH TEARS 0.5 % drop ophthalmic solution Apply 1 Drop to eye two (2) times a day.  prednisoLONE acetate (PRED FORTE) 1 % ophthalmic suspension Administer 1 Drop to right eye two (2) times a day.  hydroxychloroquine (PLAQUENIL) 200 mg tablet Take 200 mg by mouth two (2) times a day.  cycloSPORINE (RESTASIS) 0.05 % ophthalmic emulsion Administer 1 Drop to both eyes two (2) times a day.  peg 400-propylene glycol (SYSTANE) 0.4-0.3 % Drop Administer 1 Drop to right eye as needed.  methimazole (TAPAZOLE) 5 mg tablet Take 2.5 mg by mouth nightly. Allergies Allergen Reactions  Etanercept Other (comments) Caused bad infection behind knee 5/2015 Caused bad infection behind knee 5/2015 Caused bad infection behind knee 5/2015 Caused bad infection behind knee 2015 Caused bad infection behind knee 2015 Caused bad infection behind knee 2015  Haloperidol Other (comments) Hallucinations, agitation, increased BP Hallucinations, agitation, increased BP  
 Haloperidol Lactate Other (comments) Hallucinations, agitation, increased BP Hallucinations, agitation, increased BP  Morphine Nausea and Vomiting and Other (comments) N&V 
N&V 
vomitting N&V  
 Sulfasalazine Diarrhea Family History Problem Relation Age of Onset  Hypertension Mother  Heart Disease Mother  Osteoporosis Mother  Alcohol abuse Father Social History Tobacco Use  Smoking status: Current Every Day Smoker Years: 45.00 Types: Cigarettes Last attempt to quit: 2018 Years since quittin.7  Smokeless tobacco: Never Used Substance Use Topics  Alcohol use: Yes Alcohol/week: 0.0 oz  
  Comment: occasionally Patient Active Problem List  
Diagnosis Code  
 HTN (hypertension) I10  
 Hypercholesterolemia E78.00  Gastritis K29.70  
 Osteopenia M85.80  RA (rheumatoid arthritis) (HCC) M06.9  Hyperthyroidism E05.90  
 Long term current use of anticoagulant therapy Z79.01  
 Atrial fibrillation (HCC) I48.91  
 Depression F32.9  
 Goiter, toxic, multinodular E05.20  Mitral valve disorder I05.9  Osteoarthritis of right knee M17.11  
 Iron deficiency anemia D50.9  MGUS (monoclonal gammopathy of unknown significance) D47.2  Cerebral aneurysm I67.1  Tricuspid regurgitation I07.1  Pulmonary hypertension (HCC) I27.20 Depression Risk Factor Screening:  
 
3 most recent PHQ Screens 2019 Little interest or pleasure in doing things Several days Feeling down, depressed, irritable, or hopeless Nearly every day Total Score PHQ 2 4 Please see E/M portion of her visit. Alcohol Risk Factor Screening:  
Patient drinks Alcohol rarely Functional Ability and Level of Safety:  
Hearing Loss The patient needs further evaluation. No problems with cerumen removal.  No ear pain. Activities of Daily Living The home contains: handrails Patient needs help with:  transportation, laundry and housework Fall Risk Fall Risk Assessment, last 12 mths 6/25/2019 Able to walk? Yes Fall in past 12 months? Yes Fall with injury? Yes  
Number of falls in past 12 months 6 Fall Risk Score 7 She is a walker with ambulation, even at home. Abuse Screen Patient is not abused ROS: 
Gen: No fever/chlls HEENT: No sore throat, eye pain, ear pain, or congestion. No HA 
CV: No CP Resp: +SOB GI: No abdominal pain : No hematuria/dysuria Derm: No rash Neuro: No new paresthesias/weakness Musc: No new myalgias/jt pain. +Chronic jt pain from RA and chronic neck pain Psych: +Depression sx. No suicidal ideation though Cognitive Screening Evaluation of Cognitive Function: 
Has your family/caregiver stated any concerns about your memory: yes Normal  
 
Visit Vitals /57 (BP 1 Location: Left arm, BP Patient Position: Sitting) Pulse 71 Temp 97.1 °F (36.2 °C) (Oral) Resp 12 Ht 5' 1\" (1.549 m) Wt 139 lb (63 kg) SpO2 95% BMI 26.26 kg/m² General:  Alert, cooperative, no distress Head:  Normocephalic, without obvious abnormality, atraumatic. Eyes:  Conjunctivae clear Ears:  Clear external auditory canals. Clear TMs Nose: Nares normal. Septum midline. Mucosa normal. No drainage or sinus tenderness. Throat: Lips, mucosa, and tongue normal. Unremarkable oropharynx Neck: Supple, symmetrical, trachea midline, no adenopathy. Lungs:   Clear to auscultation bilaterally. Heart:  Irregularly irregular rate, no murmur, click, rub or gallop. Abdomen:   Soft, non-tender. Bowel sounds normal. No masses. Extremities: Extremities normal no edema. Pulses: +2 radial pulses b/l Skin:  No rashes or lesions. Lymph nodes: Cervical LN normal.  
Neurologic: 
Psych: Stable gait with a walker Euthymic 2/3 short item recall Unremarkable clock drawing exercise Patient Care Team  
Patient Care Team: 
Nichole Abbasi MD as PCP - [facility], [place], operated by [facility]) Sulema Echols MD (Cardiology) Paulie Denise DO as Physician (Orthopedic Surgery) Kim Chun MD (Gastroenterology) Geovani Herrera MD as Hospitalist (Infectious Diseases) Kevin Pierre MD (Dermatology) Aurelia Yuen MD (Ophthalmology) Lubna Latif MD (Oncology) Chalo Morocho MD (Endocrinology) Julio Sanchez MD as Consulting Provider (Rheumatology) Minal De La Rosa DO as Consulting Provider (Hematology and Oncology) Assessment/Plan Education and counseling provided: 
Are appropriate based on today's review and evaluation End-of-Life planning (with patient's consent) Pneumococcal Vaccine Influenza Vaccine Screening Mammography Screening Pap and pelvic (covered once every 2 years) Colorectal cancer screening tests Cardiovascular screening blood test 
Bone mass measurement (DEXA) Screening for glaucoma Diabetes screening test 
 
Diagnoses and all orders for this visit: 
 
1. Rheumatoid arthritis involving multiple sites, unspecified rheumatoid factor presence (HCC) 
-     REFERRAL TO PHYSICAL THERAPY 2. Left hip pain 
-     REFERRAL TO PHYSICAL THERAPY 3. Recurrent falls 
-     REFERRAL TO PHYSICAL THERAPY 4. Diarrhea, unspecified type -     LIPASE; Future -     METABOLIC PANEL, COMPREHENSIVE; Future -     CBC WITH AUTOMATED DIFF; Future 5. Neck pain -     XR SPINE CERV W OBL/FLEX/EXT MIN 6 V COMP; Future 6. Moderate episode of recurrent major depressive disorder (HCC) 
-     venlafaxine-SR (EFFEXOR-XR) 37.5 mg capsule; Take 1 Cap by mouth daily. 7. Medicare annual wellness visit, subsequent Health Maintenance Due Topic Date Due  
  Shingrix Vaccine Age 50> (1 of 2) 06/09/1983  Pneumococcal 65+ years (2 of 2 - PCV13) 12/28/2015  MEDICARE YEARLY EXAM  02/23/2019 Health Maintenance: I will continue to follow her depression, making medication management decisions. Please see E/M portion of her visit today.  I recommend that she get all the vaccines. She declined a pneumococcal vaccine and Shingrix vaccine is not available at our office. I encouraged her to get these vaccinations in the community. She was to check to see whether she has received pneumococcal vaccine at another facility prior to getting it at our clinic. Advance Care Planning (ACP) Provider [facility] Date of ACP Conversation: 06/25/19 Persons included in Conversation:  patient and family (daughter) Length of ACP Conversation in minutes:  <16 minutes (Non-Billable) Authorized Decision Maker (if patient is incapable of making informed decisions): This person is:  
Healthcare Agent/Medical Power of  under Advance Directive - see below For Patients with Decision Making Capacity:  
Values/Goals: Exploration of values, goals, and preferences if recovery is not expected, even with continued medical treatment in the event of:  Imminent death Severe, permanent brain injury Conversation Outcomes / Follow-Up Plan:  
Recommended completion of Advance Directive form after review of ACP materials and conversation with prospective healthcare agent . I encouraged her to complete her advance directive. All questions were answered. She wants a family member to be her POA.

## 2019-06-25 NOTE — PROGRESS NOTES
INTERNISTS OF [facility]: 
6/25/2019, MRN: [id number] Esther Dardne is a 80 y.o. female who presents to clinic for chronic disease f/u and Annual Wellness Visit (ROOM 2) Subjective:  
Pt is an 79yo female with h/o iron deficiency, DJD, mitral valve disease, goiter, depression, dementia, AF, right MCA aneurysm, ARMANDO (not using CPAP rx), hyperthyroidism, HTN, rheumatoid arthritis, gastritis, MGUS, and osteopenia/osteoporosis (starting prolia injections, 2019). 1. +H/o Aneurysm and Multiple Falls: The patient had a occipital bone fracture, subdural hematoma, and intracerebral hemorrhage in September 2018 status post fall. Since then, she has had multiple falls. As result, she is unable to take any type of blood thinner despite history of atrial fibrillation. Imaging studies have shown a right MCA cerebral aneurysm that is 5 mm in size. Per Maggy Crowe with Neurology, no intervention is recommended given her low risk of aneurysm rupture. The patient has had several falls since her last appointment. She uses a walker with DME with ambulation. 2.  Dementia and Depression: Per Neurology records, the pt has a vascular type of dementia. Per review of the Neurology records, the patient and her family do not want to pursue any additional testing or treatment. She takes sertraline given a history of depression. Depression symptoms have worsened over the past year. She admits she often feels lonely, living by herself. No suicidal ideation. Her PHQ 2 screening is positive today. 3.  Hyperthyroidism: Followed by Endocrinology. Her last apt with them: 6 wks ago. On methimazole. Her labs were checked and \"ok\" since \"her Synthroid was bumped up. \" She is on synthroid 150mcg daily and methimazole. Her daughter is with her today and confirms that the pt is to take both meds. 4. RA: Present for years. Followed by Rheumatology.   On sulfasalazine and Plaquenil. Sulfasalzine caused nausea at higher doses of 1000mg bid. She reports pain along the affected areas: left hip, knees, neck pain, and hands. +SOB with exertion. 5.  Hypertension: Present for over 6 months. On diltiazem and Lasix prn. No adverse side effects of taking these medicines. BP is 105/57 today. 6. MGUS/Anemia: S/p iron infusions per her Oncologist/Hematologist. She continues to follow-up with her hematologist/oncologist. 
 
7. Diarrhea: She has on avg 4 loose stools per day. She has incontinence - stool and urine - for yrs. she is urged to urinate and will leak urine if she cannot make it to the restroom in time. No alleviating factors are known. No over-the-counter Rx has been tried. 8. ARMANDO: Not using CPAP. She does not like the way CPAP feels. She reports worsening shortness of breath with exertion since her last appointment. No chest pain or palpitations. Patient Active Problem List  
 Diagnosis Date Noted  Cerebral aneurysm 12/24/2018  Tricuspid regurgitation 12/24/2018  Pulmonary hypertension ([facility] [address] 12/24/2018  Iron deficiency anemia 04/01/2017  MGUS (monoclonal gammopathy of unknown significance) 04/01/2017  Osteoarthritis of right knee 01/29/2015  Mitral valve disorder 01/12/2015  Goiter, toxic, multinodular 03/30/2014  Depression 05/10/2012  Atrial fibrillation ([facility] [address] 02/08/2012  Long term current use of anticoagulant therapy 12/19/2011  Hyperthyroidism 12/14/2011  RA (rheumatoid arthritis) ([facility] [address] 07/21/2011  
 HTN (hypertension) 08/27/2010  Hypercholesterolemia 08/27/2010  Gastritis 08/27/2010  Osteopenia 08/27/2010 Current Outpatient Medications Medication Sig Dispense Refill  simvastatin (ZOCOR) 10 mg tablet TAKE 1 TABLET NIGHTLY 90 Tab 3  
 omeprazole (PRILOSEC) 40 mg capsule TAKE 1 CAPSULE DAILY 90 Cap 3  
 sulfaSALAzine (AZULFIDINE) 500 mg tablet Take 500 mg by mouth two (2) times a day.  prednisoLONE 5 mg tab Take  by mouth every other day. Indications: takes QOD  levothyroxine (SYNTHROID) 125 mcg tablet Take 150 mcg by mouth Daily (before breakfast).  cyclobenzaprine HCl (FLEXERIL PO) Take 10 mg by mouth. Indications: as needed  lidocaine (LIDODERM) 5 % by TransDERmal route every twenty-four (24) hours. Apply patch to the affected area for 12 hours a day and remove for 12 hours a day.  dilTIAZem CD (CARDIZEM CD) 180 mg ER capsule TAKE 1 CAPSULE DAILY 90 Cap 3  
 sertraline (ZOLOFT) 100 mg tablet Take 1 Tab by mouth daily. 90 Tab 3  
 VITAMIN D2 50,000 unit capsule Take 50,000 Units by mouth two (2) times a week.  GENTAK 0.3 % (3 mg/gram) ophthalmic ointment Administer 0.5 Inches to right eye nightly. 3  
 furosemide (LASIX) 40 mg tablet TAKE 1 TABLET DAILY FOR EDEMA (Patient taking differently: TAKE 1 TABLET BY MOUTH EVERY OTHER DAY FOR EDEMA) 90 Tab 3  celecoxib (CELEBREX) 200 mg capsule Take 1 Cap by mouth daily as needed. 90 Cap 3  
 REFRESH TEARS 0.5 % drop ophthalmic solution Apply 1 Drop to eye two (2) times a day.  prednisoLONE acetate (PRED FORTE) 1 % ophthalmic suspension Administer 1 Drop to right eye two (2) times a day.  hydroxychloroquine (PLAQUENIL) 200 mg tablet Take 200 mg by mouth two (2) times a day.  cycloSPORINE (RESTASIS) 0.05 % ophthalmic emulsion Administer 1 Drop to both eyes two (2) times a day.  peg 400-propylene glycol (SYSTANE) 0.4-0.3 % Drop Administer 1 Drop to right eye as needed.  methimazole (TAPAZOLE) 5 mg tablet Take 2.5 mg by mouth nightly. Allergies Allergen Reactions  Etanercept Other (comments) Caused bad infection behind knee 5/2015 Caused bad infection behind knee 5/2015 Caused bad infection behind knee 5/2015 Caused bad infection behind knee 5/2015 Caused bad infection behind knee 5/2015 Caused bad infection behind knee 5/2015  Haloperidol Other (comments) Hallucinations, agitation, increased BP Hallucinations, agitation, increased BP  
 Haloperidol Lactate Other (comments) Hallucinations, agitation, increased BP Hallucinations, agitation, increased BP  Morphine Nausea and Vomiting and Other (comments) N&V 
N&V 
vomitting N&V Past Medical History:  
Diagnosis Date  Acute gastric ulcer without mention of hemorrhage, perforation, or obstruction 1989  
 surg repair  Anemia  Arrhythmia  Atrial fibrillation ([facility] [address] 12/14/2011  Cataract 8-2001 RIGHT  Depression 4-  Edema leg RIGHT   
 NORMAL VENOUS DOPPLER  
 Goiter Nodular 7-30-01  
 History of echocardiogram 04/20/2015 EF 50-55%. No RWMA. Mild LVH. Indeterminate diastolic fx. RVSP 40 mmHg. Severe LAE. Mild JENNY. Mild MVP of anterior leaflet. Mod MR w/2 jets. Mod TR. No significant chg from study of 6/26/14.  History of Holter monitoring 01/09/2012 Well controlled atrial fibrillation. Avg HR 83 bpm.    
 Hypercholesterolemia  Hypertension  Hyperthyroidism 12/14/2011  Insomnia 6-18-09  Leg swelling  Lower extremity venous duplex 12/18/2013 Right leg:  No venous thrombosis.  Multinodular goiter  Osteopenia 8/27/2010  Osteoporosis 2-2009  
 s/p reclast rx  Otitis media  Retina, separation 2004  
 detached retina  Rheumatoid arthritis(714.0)   
 rheumatoid  Septic arthritis [facility]) May 2015 R knee following with Ortho and ID  Skin cancer (melanoma) ([facility] [address] 03/2013  Symptomatic menopausal or female climacteric states  Thyroid disease  Tuberculin test reaction Past Surgical History:  
Procedure Laterality Date  HX BLEPHAROPLASTY  HX HEENT    
 hx detached retina  HX HIP REPLACEMENT Left  HX KNEE ARTHROSCOPY    
 scheduled for replacement but could not do due to infection  HX MOHS PROCEDURES  [id number] LEFT  
 HX ORTHOPAEDIC    
 right leg sx  HX SKIN BIOPSY  2013  
 malignant melanoma [address] [facility]  ID COLONOSCOPY FLX DX W/COLLJ SPEC WHEN PFRMD    
 polyp(tubular adenoma); Dr Soumya Bustillo  ID COLONOSCOPY W/BIOPSY SINGLE/MULTIPLE  2013  
 (+)polyp (bx: tubular adenoma) Dr. Soumya Bustillo Family History Problem Relation Age of Onset  Hypertension Mother  Heart Disease Mother  Osteoporosis Mother  Alcohol abuse Father Social History Tobacco Use  Smoking status: Current Every Day Smoker Years: 45.00 Types: Cigarettes Last attempt to quit: 2018 Years since quittin.7  Smokeless tobacco: Never Used Substance Use Topics  Alcohol use: Yes Alcohol/week: 0.0 oz  
  Comment: occasionally ROS Review of Systems Constitutional: Negative for chills, fever and malaise/fatigue. +Generalized weakness HENT: Positive for hearing loss. Negative for ear pain and sore throat. Eyes: Negative for pain. Respiratory: Positive for shortness of breath (with exertion). Negative for cough. Cardiovascular: Negative for chest pain. Gastrointestinal: Negative for abdominal pain, blood in stool and melena. Genitourinary: Negative for dysuria and hematuria. Musculoskeletal: Positive for joint pain (see HPI) and neck pain. Negative for myalgias. Skin: Negative for rash. Neurological: Negative for headaches. Endo/Heme/Allergies: Does not bruise/bleed easily. Psychiatric/Behavioral: Negative for substance abuse. Objective Vitals:  
 19 1343 BP: 105/57 Pulse: 71 Resp: 12 Temp: 97.1 °F (36.2 °C) TempSrc: Oral  
SpO2: 95% Weight: 139 lb (63 kg) Height: 5' 1\" (1.549 m) PainSc:   4 PainLoc: Neck Physical Exam  
Constitutional: She is oriented to person, place, and time and well-developed, well-nourished, and in no distress. HENT:  
Head: Normocephalic and atraumatic.   
Right Ear: External ear normal.  
 Left Ear: External ear normal.  
Nose: Nose normal.  
Mouth/Throat: Oropharynx is clear and moist. No oropharyngeal exudate. Eyes: Conjunctivae are normal. Right eye exhibits no discharge. Left eye exhibits no discharge. No scleral icterus. Neck: Neck supple. Cardiovascular: Normal rate and intact distal pulses. Exam reveals no gallop and no friction rub. No murmur heard. Irregularly irregular HR Pulmonary/Chest: Effort normal and breath sounds normal. No respiratory distress. She has no wheezes. She has no rales. Abdominal: Soft. Bowel sounds are normal. She exhibits no distension. There is no tenderness. There is no rebound and no guarding. Musculoskeletal: She exhibits no edema or tenderness (Bue). Lymphadenopathy:  
  She has no cervical adenopathy. Neurological: She is alert and oriented to person, place, and time. She exhibits normal muscle tone. Stable gait with walker Skin: Skin is warm and dry. No erythema. Psychiatric: Affect normal.  
Nursing note and vitals reviewed. LABS Data Review:  
Lab Results Component Value Date/Time WBC 6.6 10/16/2017 02:56 PM  
 Hemoglobin, POC 10.9 (L) 05/20/2013 11:02 AM  
 HGB 8.4 (L) 10/16/2017 02:56 PM  
 Hematocrit, POC 32 (L) 05/20/2013 11:02 AM  
 HCT 28.3 (L) 10/16/2017 02:56 PM  
 PLATELET 700 92/88/9391 02:56 PM  
 MCV 95.6 10/16/2017 02:56 PM  
 
 
Lab Results Component Value Date/Time Sodium 139 10/16/2017 02:56 PM  
 Potassium 5.2 10/16/2017 02:56 PM  
 Chloride 106 10/16/2017 02:56 PM  
 CO2 26 10/16/2017 02:56 PM  
 Anion gap 7 10/16/2017 02:56 PM  
 Glucose 95 10/16/2017 02:56 PM  
 BUN 23 (H) 10/16/2017 02:56 PM  
 Creatinine 1.13 10/16/2017 02:56 PM  
 BUN/Creatinine ratio 20 10/16/2017 02:56 PM  
 GFR est AA 56 (L) 10/16/2017 02:56 PM  
 GFR est non-AA 46 (L) 10/16/2017 02:56 PM  
 Calcium 8.8 10/16/2017 02:56 PM  
 
 
Lab Results Component Value Date/Time  Cholesterol, total 170 10/16/2017 02:56 PM  
 HDL Cholesterol 98 (H) 10/16/2017 02:56 PM  
 LDL, calculated 54.8 10/16/2017 02:56 PM  
 VLDL, calculated 17.2 10/16/2017 02:56 PM  
 Triglyceride 86 10/16/2017 02:56 PM  
 CHOL/HDL Ratio 1.7 10/16/2017 02:56 PM  
 
 
No results found for: HBA1C, HGBE8, NSD9PRVM, UUF9FLYX, NHU4OBYY Assessment/Plan: 1. Hypertension: Stable. +H/o AF. Having SOB with exertion. Continue with Rx as prescribed. C/w Cardiology f/u.  
 
2.  Hypothyroidism: Per her caregiver/daughter, patient is on both Synthroid and methimazole. I find this regimen quite unusual.  We will continue to follow alongside the Endocrinology team. 
Continue to follow-up with Endocrinology. Will need to get records. Continue with Rx per the patient's Endocrine team for now. 3.  History of Aneurysm and Falls: +Depression - Not well controlled on sertraline. PHQ9 score is 15 today  Fall risk reduction measures were discussed with her today.  PT/OT evaluation/treatment recommended. Referral placed for PT eval/treat. Continue with sertraline but we will wean her off of this medication and start Effexor. I instructed the patient to notify me if her symptoms worsen on this medication. I also instructed her to notify me if she develops any adverse effects of taking Effexor. 4.  Rheumatoid Arthritis: +Neck pain today. +H/o disc disease along her c spine. +Having SOB. Continue follow-up with Rheumatology Continue with Rx per their recommendations.  Adding sulfasalazine as a medication intolerance. She could tolerate the lower dose in place of the higher dose of 100,000 mg twice daily. Ordering a C-spine x-ray series. 5.  Dementia: Vascular. +H/o ICH.  Observation. Supportive care. 6.  ARMANDO: +Having SOB with exertion.  We discussed the importance of CPAP therapy. The patient was a hold off on this though. 7.  Diarrhea: +Chronic. Checking a CMP, CBC, and lipase.  If her symptoms worsen or continue and her lab results mentioned above are normal, I instructed her to notify me so that I can refer her to GI for additional testing.  I encouraged her to use Pepto-Bismol as needed or Imodium over-the-counter as needed. Return to clinic to assess her response. 8.  General: 
 He seems to have symptoms of OAB. I will consider adding an OAB type medication at her follow-up appointment for her urinary urge incontinence symptoms. - I will need to f/u with her about her DUTTA sx off/on. From deconditioning? No CP/palpitations today. 9.  Anemia/MGUS: 
I encouraged the patient to continue following up with her Hematology/Oncology team.  Continue with treatment/surveillance studies per their recommendations. Health Maintenance Due Topic Date Due  Shingrix Vaccine Age 50> (1 of 2) 06/09/1983  Pneumococcal 65+ years (2 of 2 - PCV13) 12/28/2015  MEDICARE YEARLY EXAM  02/23/2019 Lab review: labs are reviewed and ordered as mentioned above I have discussed the diagnosis with the patient and the intended plan as seen in the above orders. The patient has received an after-visit summary and questions were answered concerning future plans. I have discussed medication side effects and warnings with the patient as well. I have reviewed the plan of care with the patient, accepted their input and they are in agreement with the treatment goals. All questions were answered. The patient understands the plan of care. Handouts provided today with above information. Pt instructed if symptoms worsen to call the office or report to the ED for continued care. Greater than 50% of the visit time was spent in counseling and/or coordination of care. Voice recognition was used to generate this report, which may have resulted in some phonetic based errors in grammar and contents.  Even though attempts were made to correct all the mistakes, some may have been missed, and remained in the body of the document.  
 
 
 
 
Red Gray MD

## 2019-06-25 NOTE — PATIENT INSTRUCTIONS
Health Maintenance Due Topic Date Due  Shingrix Vaccine Age 50> (1 of 2) 06/09/1983  Pneumococcal 65+ years (2 of 2 - PCV13) 12/28/2015  MEDICARE YEARLY EXAM  02/23/2019 Medicare Wellness Visit, Female The best way to live healthy is to have a lifestyle where you eat a well-balanced diet, exercise regularly, limit alcohol use, and quit all forms of tobacco/nicotine, if applicable. Regular preventive services are another way to keep healthy. Preventive services (vaccines, screening tests, monitoring & exams) can help personalize your care plan, which helps you manage your own care. Screening tests can find health problems at the earliest stages, when they are easiest to treat. [facility] follows the current, evidence-based guidelines published by the [facility] ([facility]) when recommending preventive services for our patients. Because we follow these guidelines, sometimes recommendations change over time as research supports it. (For example, mammograms used to be recommended annually. Even though Medicare will still pay for an annual mammogram, the newer guidelines recommend a mammogram every two years for women of average risk.) Of course, you and your doctor may decide to screen more often for some diseases, based on your risk and your health status. Preventive services for you include: - Medicare offers their members a free annual wellness visit, which is time for you and your primary care provider to discuss and plan for your preventive service needs. Take advantage of this benefit every year! 
-All adults over the age of 72 should receive the recommended pneumonia vaccines. Current USPSTF guidelines recommend a series of two vaccines for the best pneumonia protection.  
-All adults should have a flu vaccine yearly and a tetanus vaccine every 10 years. All adults age 61 and older should receive a shingles vaccine once in their lifetime. -A bone mass density test is recommended when a woman turns 65 to screen for osteoporosis. This test is only recommended one time, as a screening. Some providers will use this same test as a disease monitoring tool if you already have osteoporosis. -All adults age 38-68 who are overweight should have a diabetes screening test once every three years.  
-Other screening tests and preventive services for persons with diabetes include: an eye exam to screen for diabetic retinopathy, a kidney function test, a foot exam, and stricter control over your cholesterol.  
-Cardiovascular screening for adults with routine risk involves an electrocardiogram (ECG) at intervals determined by your doctor.  
-Colorectal cancer screenings should be done for adults age 54-65 with no increased risk factors for colorectal cancer. There are a number of acceptable methods of screening for this type of cancer. Each test has its own benefits and drawbacks. Discuss with your doctor what is most appropriate for you during your annual wellness visit. The different tests include: colonoscopy (considered the best screening method), a fecal occult blood test, a fecal DNA test, and sigmoidoscopy. -Breast cancer screenings are recommended every other year for women of normal risk, age 54-69. 
-Cervical cancer screenings for women over age 72 are only recommended with certain risk factors.  
-All adults born between [facility] should be screened once for Hepatitis C. Here is a list of your current Health Maintenance items (your personalized list of preventive services) with a due date: 
Health Maintenance Due Topic Date Due  Shingles Vaccine (1 of 2) 06/09/1983  Pneumococcal Vaccine (2 of 2 - PCV13) 12/28/2015 [place] Annual Well Visit  02/23/2019 Medicare Part B Preventive Services Limitations Recommendation Scheduled Bone Mass Measurement 
(age 72 & older, biennial) Requires diagnosis related to osteoporosis or estrogen deficiency. Biennial benefit unless patient has history of long-term glucocorticoid tx or baseline is needed because initial test was by other method This should be done at age 72 and again if on osteoporosis medication at 2-3 year intervals. Cardiovascular Screening Blood Tests (every 5 years) Total cholesterol, HDL, Triglycerides Order as a panel if possible We should check your cholesterol panel at least once every 5 years. Colorectal Cancer Screening 
-Fecal occult blood test (annual) -Flexible sigmoidoscopy (5y) 
-Screening colonoscopy (10y) -Barium Enema  Due per your Gastroenterologist's recommendations. Counseling to Prevent Tobacco Use (up to 8 sessions per year) - Counseling greater than 3 and up to 10 minutes - Counseling greater than 10 minutes Patients must be asymptomatic of tobacco-related conditions to receive as preventive service Continue with smoking cessation Diabetes Screening Tests (at least every 3 years, Medicare covers annually or at 6-month intervals for prediabetic patients) Fasting blood sugar (FBS) or glucose tolerance test (GTT) Patient must be diagnosed with one of the following: 
-Hypertension, Dyslipidemia, obesity, previous impaired FBS or GTT 
Or any two of the following: overweight, FH of diabetes, age ? 72, history of gestational diabetes, birth of baby weighing more than 9 pounds Should be done yearly Diabetes Self-Management Training (DSMT) (no USPSTF recommendation) Requires referral by treating physician for patient with diabetes or renal disease. 10 hours of initial DSMT session of no less than 30 minutes each in a continuous 12-month period. 2 hours of follow-up DSMT in subsequent years. Not applicable Glaucoma Screening (no USPSTF recommendation) Diabetes mellitus, family history, , age 48 or over,  American, age 72 or over Continue with annual eye exams. Human Immunodeficiency Virus (HIV) Screening (annually for increased risk patients) HIV-1 and HIV-2 by EIA, BACILIO, rapid antibody test, or oral mucosa transudate Patient must be at increased risk for HIV infection per USPSTF guidelines or pregnant. Tests covered annually for patients at increased risk. Pregnant patients may receive up to 3 test during pregnancy. Not applicable Medical Nutrition Therapy (MNT) (for diabetes or renal disease not recommended schedule) Requires referral by treating physician for patient with diabetes or renal disease. Can be provided in same year as diabetes self-management training (DSMT), and CMS recommends medical nutrition therapy take place after DSMT. Up to 3 hours for initial year and 2 hours in subsequent years. Not applicable Shingles Vaccination A shingles vaccine is also recommended once in a lifetime after age 61 Vaccination recommended for shingles vaccination. Seasonal Influenza Vaccination (annually)  Continue with yearly \"flu\" shot annually Pneumococcal Vaccination (once after 65)  Please receive this vaccination at age 72. Hepatitis B Vaccinations (if medium/high risk) Medium/high risk factors:  End-stage renal disease, Hemophiliacs who received Factor VIII or IX concentrates, Clients of institutions for the mentally retarded, Persons who live in the same house as a HepB virus carrier, Homosexual men, Illicit injectable drug abusers. Not applicable Screening Mammography (biennial age 54-69) Annually (age 36 or over) You need a mammogram yearly to screen for breast cancer. Screening Pap Tests and Pelvic Examination (up to age 79 and after 79 if unknown history or abnormal study last 10 years) Every 24 months except high risk You need no Pap smear at this time.    
Ultrasound Screening for Abdominal Aortic Aneurysm (AAA) (once) Patient must be referred through IPPE and not have had a screening for abdominal aortic aneurysm before under Medicare. Limited to patients who meet one of the following criteria: 
- Men who are 73-68 years old and have smoked more than 100 cigarettes in their lifetime. 
-Anyone with a FH of AAA 
-Anyone recommended for screening by USPSTF Not applicable Medicare Wellness Visit, Female The best way to live healthy is to have a lifestyle where you eat a well-balanced diet, exercise regularly, limit alcohol use, and quit all forms of tobacco/nicotine, if applicable. Regular preventive services are another way to keep healthy. Preventive services (vaccines, screening tests, monitoring & exams) can help personalize your care plan, which helps you manage your own care. Screening tests can find health problems at the earliest stages, when they are easiest to treat. [facility] follows the current, evidence-based guidelines published by the [facility] Kearney ([facility]) when recommending preventive services for our patients. Because we follow these guidelines, sometimes recommendations change over time as research supports it. (For example, mammograms used to be recommended annually. Even though Medicare will still pay for an annual mammogram, the newer guidelines recommend a mammogram every two years for women of average risk.) Of course, you and your doctor may decide to screen more often for some diseases, based on your risk and your health status. Preventive services for you include: - Medicare offers their members a free annual wellness visit, which is time for you and your primary care provider to discuss and plan for your preventive service needs. Take advantage of this benefit every year! 
-All adults over the age of 72 should receive the recommended pneumonia vaccines.  Current USPSTF guidelines recommend a series of two vaccines for the best pneumonia protection.  
-All adults should have a flu vaccine yearly and a tetanus vaccine every 10 years. All adults age 61 and older should receive a shingles vaccine once in their lifetime.   
-A bone mass density test is recommended when a woman turns 65 to screen for osteoporosis. This test is only recommended one time, as a screening. Some providers will use this same test as a disease monitoring tool if you already have osteoporosis. -All adults age 38-68 who are overweight should have a diabetes screening test once every three years.  
-Other screening tests and preventive services for persons with diabetes include: an eye exam to screen for diabetic retinopathy, a kidney function test, a foot exam, and stricter control over your cholesterol.  
-Cardiovascular screening for adults with routine risk involves an electrocardiogram (ECG) at intervals determined by your doctor.  
-Colorectal cancer screenings should be done for adults age 54-65 with no increased risk factors for colorectal cancer. There are a number of acceptable methods of screening for this type of cancer. Each test has its own benefits and drawbacks. Discuss with your doctor what is most appropriate for you during your annual wellness visit. The different tests include: colonoscopy (considered the best screening method), a fecal occult blood test, a fecal DNA test, and sigmoidoscopy. -Breast cancer screenings are recommended every other year for women of normal risk, age 54-69. 
-Cervical cancer screenings for women over age 72 are only recommended with certain risk factors.  
-All adults born between [facility] should be screened once for Hepatitis C. Here is a list of your current Health Maintenance items (your personalized list of preventive services) with a due date: 
Health Maintenance Due Topic Date Due  Shingles Vaccine (1 of 2) 06/09/1983  Pneumococcal Vaccine (2 of 2 - PCV13) 12/28/2015 [place] Annual Well Visit  02/23/2019 Medicare Wellness Visit, Female The best way to live healthy is to have a lifestyle where you eat a well-balanced diet, exercise regularly, limit alcohol use, and quit all forms of tobacco/nicotine, if applicable. Regular preventive services are another way to keep healthy. Preventive services (vaccines, screening tests, monitoring & exams) can help personalize your care plan, which helps you manage your own care. Screening tests can find health problems at the earliest stages, when they are easiest to treat. [facility] follows the current, evidence-based guidelines published by the [facility] Kearney (USPSTF) when recommending preventive services for our patients. Because we follow these guidelines, sometimes recommendations change over time as research supports it. (For example, mammograms used to be recommended annually. Even though Medicare will still pay for an annual mammogram, the newer guidelines recommend a mammogram every two years for women of average risk.) Of course, you and your doctor may decide to screen more often for some diseases, based on your risk and your health status. Preventive services for you include: - Medicare offers their members a free annual wellness visit, which is time for you and your primary care provider to discuss and plan for your preventive service needs. Take advantage of this benefit every year! 
-All adults over the age of 72 should receive the recommended pneumonia vaccines. Current USPSTF guidelines recommend a series of two vaccines for the best pneumonia protection.  
-All adults should have a flu vaccine yearly and a tetanus vaccine every 10 years. All adults age 61 and older should receive a shingles vaccine once in their lifetime.   
-A bone mass density test is recommended when a woman turns 65 to screen for osteoporosis. This test is only recommended one time, as a screening.  Some providers will use this same test as a disease monitoring tool if you already have osteoporosis. -All adults age 38-68 who are overweight should have a diabetes screening test once every three years.  
-Other screening tests and preventive services for persons with diabetes include: an eye exam to screen for diabetic retinopathy, a kidney function test, a foot exam, and stricter control over your cholesterol.  
-Cardiovascular screening for adults with routine risk involves an electrocardiogram (ECG) at intervals determined by your doctor.  
-Colorectal cancer screenings should be done for adults age 54-65 with no increased risk factors for colorectal cancer. There are a number of acceptable methods of screening for this type of cancer. Each test has its own benefits and drawbacks. Discuss with your doctor what is most appropriate for you during your annual wellness visit. The different tests include: colonoscopy (considered the best screening method), a fecal occult blood test, a fecal DNA test, and sigmoidoscopy. -Breast cancer screenings are recommended every other year for women of normal risk, age 54-69. 
-Cervical cancer screenings for women over age 72 are only recommended with certain risk factors.  
-All adults born between [facility] should be screened once for Hepatitis C. Here is a list of your current Health Maintenance items (your personalized list of preventive services) with a due date: 
Health Maintenance Due Topic Date Due  Shingles Vaccine (1 of 2) 06/09/1983  Pneumococcal Vaccine (2 of 2 - PCV13) [facility]/2015 [facility]  02/23/2019

## 2019-06-26 NOTE — ACP (ADVANCE CARE PLANNING)
Advance Care Planning (ACP) Provider [facility] Date of ACP Conversation: 06/25/19 Persons included in Conversation:  patient and family (daughter) Length of ACP Conversation in minutes:  <16 minutes (Non-Billable) Authorized Decision Maker (if patient is incapable of making informed decisions): This person is:  
Healthcare Agent/Medical Power of  under Advance Directive - see below For Patients with Decision Making Capacity:  
Values/Goals: Exploration of values, goals, and preferences if recovery is not expected, even with continued medical treatment in the event of:  Imminent death Severe, permanent brain injury Conversation Outcomes / Follow-Up Plan:  
Recommended completion of Advance Directive form after review of ACP materials and conversation with prospective healthcare agent . I encouraged her to complete her advance directive. All questions were answered. She wants a family member to be her POA. Dr. Luz Elena Hightower [facility] [address] [facility], [facility] [address] Phone: [phone] Fax: [phone]

## 2019-07-01 NOTE — TELEPHONE ENCOUNTER
Please let her know that her anemia seems to be improving.  Her hemoglobin is 9.6.  Her pancreatic function is normal.  Her kidney and liver function are normal.

## 2019-07-01 NOTE — TELEPHONE ENCOUNTER
----- Message from Sterling Leyva MD sent at 7/1/2019  7:34 AM EDT -----  Please let her know that her cervical spine x-ray series shows findings consistent with cervical disc disease and osteoarthritis.     Dr. Lonnie Garcia  [facility] [address], [address] [place] [address].  Phone: [phone]  Fax: [phone]

## 2019-07-01 NOTE — PROGRESS NOTES
Please let her know that her cervical spine x-ray series shows findings consistent with cervical disc disease and osteoarthritis.     Dr. Anisa Whyte  [facility], [facility], [address] [place] [address] Str.  Phone: [phone]  Fax: [phone]

## 2019-07-01 NOTE — TELEPHONE ENCOUNTER
Pt and daughter aware of message below and verbalized understanding. No further questions or concerns from pt at this time.

## 2019-07-01 NOTE — PROGRESS NOTES
Please let her know that her anemia seems to be improving. Her hemoglobin is 9.6. Her pancreatic function is normal.  Her kidney and liver function are normal. 
 
Dr. Elysia Crawford [facility] [address] [place] [address] [address], [address] [facility], [address] [place] [address] Str. Phone: [phone] Fax: [phone]

## 2019-07-02 NOTE — PROGRESS NOTES
PT DAILY TREATMENT NOTE/VESTIBULAR EVAL 10-18 Patient Name: Radhika Zimmerman Date:2019 : 1933 [x]  Patient  Verified Payor: [place] MEDICARE / Plan: Travis Farmer / Product Type: Medicare / In time:4:31pm  Out time:5:16pm 
Total Treatment Time (min): 45 Visit #: 1 of 16 Medicare/BCBS Only Total Timed Codes (min):  24 1:1 Treatment Time:  45  
 
Treatment Area: Left hip pain [M25.552] Rheumatoid arthritis, unspecified [M06.9] Repeated falls [R29.6] SUBJECTIVE Pain Level (0-10 scale): 0 
[]constant []intermittent []improving []worsening []no change since onset Any medication changes, allergies to medications, adverse drug reactions, diagnosis change, or new procedure performed?: [x] No    [] Yes (see summary sheet for update) Subjective functional status/changes:    
Pt is an 80year old female who reports a Hx recurrent falls over the last few years, worsening since skull fracture last year. She reports occasional dizziness with movement, weakness in her legs, and reports she has been very sedentary since her  passed away a year ago. She reports she walks with a FWW but still occasionally falls and needs guidance with stair negotiation at home. Hx supplemented by pt's daughter during examination. PLOF: FWW use for ambulation Limitations to PLOF: increasing frequency of falls Mechanism of Injury: chronic Current symptoms/Complaints: see above Previous Treatment/Compliance: HHPT 4 weeks post hospitalization last year for skull fracture PMHx/Surgical Hx: B TKA, left LEWIS, right shoulder fracture, RA, HTN, skull fracture, possible dementia, impaired vision, impaired hearing Work Hx: see intake Living Situation: daughter cares for her 2 days per week, home health aide 5 days/week Pt Goals: see intake Barriers: []pain []financial []time []transportation []other Motivation: appears motivated and cooperative Substance use: []Alcohol []Tobacco []other: FABQ Score: []low []elevate Cognition: A & O x 4    Other: mild confusion OBJECTIVE/EXAMINATION Domestic Life:  
Activity/Recreational Limitations:  
Mobility:  
Self Care:  
 
21 min [x]Eval                  []Re-Eval  
 
14 min Therapeutic Exercise:  [] See flow sheet : HEP handout Rationale: increase strength and improve coordination to improve the patients ability to improve LE strength, ease of transfers, and reduce fall risk. Self Care: 10 minutes pt education relevant anatomy, diagnosis, prognosis, plan of care, activity modification to facilitate pt self management. With 
 [] TE 
 [] TA 
 [] neuro 
 [] other: Patient Education: [x] Review HEP [] Progressed/Changed HEP based on:  
[] positioning   [] body mechanics   [] transfers   [] heat/ice application   
[] other:   
 
Other Objective/Functional Measures: 
 
Physical Therapy Evaluation - Vestibular Posture:  [] WNL [x] Forward head    [x] Protracted shoulders    [] Retracted shoulders 
[] Kyphosis:  [] increased   [] decreased  
[] Lordosis:   [] increased   [] decreased Other: C/S ROM: [] WFL    [] Limited    Describe: 
 
Strength: [] WFL    [x] Limited    Describe: see below Optional Tests: 
Sensation:  [] Intact [] Diminished    Describe: 
 
Proprioception: [] Intact [x] Diminished    Describe: 
 
Coordination Testing: 
     Disdiadochokinesia [] WFL    [] Impaired    Describe: 
     Heel - Roche  [] WFL    [] Impaired    Describe: 
     FNF   [] WFL    [] Impaired    Describe: Toe Tap   [] WFL    [] Impaired    Describe: 
 
Oculomotor Tests: (Fixation Not Blocked) Ocular ROM:   [] [facility]    [] Limited    Describe:NT Spontaneous Nystag. [] Neg     [] Pos    [] Left    [] Right NT 
     Gaze Holding Nystag. [] Neg     [] Pos    [] Left    [] Right NT Smooth Pursuit  [] Neg     [] Pos    [] Left    [] Right NT      Saccades   [] Neg     [] Pos    [] Left    [] Right NT 
 VOR - Slow Head Mvmt [] Neg     [] Pos    [] Left    [] Right NT 
     VOR - Fast Head Mvmt [] Neg     [] Pos    [] Left    [] Right NT Head Thrust  [] Neg     [] Pos    [] Left    [] Right NT Static Visual Acuity [] Neg     [] Pos    [] Left    [] Right NT Dynamic Visual Acuity [] Neg     [] Pos    [] Left    [] Right NT Oculomotor Tests: (Fixation Blocked) Spontaneous Nystag. [] Neg     [] Pos    [] Left    [] Right Gaze Holding Nystag. [] Neg     [] Pos    [] Left    [] Right Head-Shaking Nystag. [] Neg     [] Pos    [] Left    [] Right Other Special Tests: 
     Vertebral Artery Testing [] Neg     [] Pos    [] Left    [] Right NT [place] Maneuver [] Neg     [] Pos    [] Left    [] Right NT Roll Test   [] Neg     [] Pos    [] Left    [] Right NT Benitez Balance Scale [] Neg     [] Pos    Score: 
     Dynamic Gait Index [] Neg     [] Pos    Score: 9/24 with FWW Functional Gait Assess. [] Neg     [] Pos    Score: 
 
Balance Standard Testing (Eyes Open/Eyes Closed - EO/EC) Romberg   [] [facility]    [x] Pos    Describe: Instability and frequent LOB with EO and EC Stand on Foam  [] WFL    [] Pos    Describe: NT 
     Standing on Rail  [] WFL    [] Pos    Describe: NT Sharpened Romberg [] WFL    [] Pos    Describe: Frequent LOB with EO, EC NT Single Leg Stand  [] [facility]    [] Pos    Describe: NT 
 
Motion Sensitivity:  [] Neg     [] Pos    Describe: 
 
Computerized Dynamic Posturography:  
     [] Not Tested    [] WFL    Score: Other Tests: 
Hip strength: flex 3+/5 bilat, abd left 2/5, right 3-/5 Knee: ext 4/5 bilat, flex 3+/5 bilat Ankle: DF 4/5 bilat, 3/5 bilat Pain Level (0-10 scale) post treatment: 0 
 
ASSESSMENT/Changes in Function: Per POC.  
 
Patient will continue to benefit from skilled PT services to modify and progress therapeutic interventions, address functional mobility deficits, address strength deficits, analyze and address soft tissue restrictions, analyze and cue movement patterns, address imbalance/dizziness and instruct in home and community integration to attain remaining goals. [x]  See Plan of Care 
[]  See progress note/recertification 
[]  See Discharge Summary Progress towards goals / Updated goals: PER POC. PLAN [x]  Upgrade activities as tolerated     [x]  Continue plan of care 
[]  Update interventions per flow sheet      
[]  Discharge due to:_ 
[x]  Other:_LE strength, balance training, improve stability with FWW use Marilin South PT, DPT, ATC 7/2/2019  4:42 PM

## 2019-07-02 NOTE — PROGRESS NOTES
In [address] [address] [place] [address], [address] 
[phone] fax Plan of Care/ Statement of Necessity for Physical Therapy Services Patient name: Shar Lopez Start of Care: 2019 Referral source: Yaneth Doran MD : 1933 Medical Diagnosis: Left hip pain [M25.552] Rheumatoid arthritis, unspecified [M06.9] Repeated falls [R29.6] Payor: [place] MED[place] / Plan: [address] [place], [place] [address]y / Product Type: Medicare /  Onset Date: several years, worsening since 2018 Treatment Diagnosis: impaired balance and LE weakness Prior Hospitalization: see medical history Provider#: [id number] Medications: Verified on Patient summary List  
 Comorbidities: B TKA, left LEWIS, right shoulder fracture, RA, HTN, skull fracture, possible dementia, impaired vision, impaired hearing, osteoporosis Prior Level of Function: FWW use for ambulation The Plan of Care and following information is based on the information from the initial evaluation. Assessment/ key information: Pt is an 80year old female who reports a Hx recurrent falls over the last few years, worsening since skull fracture last year. She reports occasional dizziness with movement, weakness in her legs, and reports she has been very sedentary since her  passed away a year ago. She reports she walks with a FWW but still occasionally falls and needs guidance with stair negotiation at home. She presents with multifactorial contributions to her falls and instability with signs of possible vestibular dysfunction, poor balance, fall risk per DGI despite use of FWW assist, bilat LE weakness, most notably in B hips, impaired ease of transfers, and appears deconditioned with notable SOB during evaluation requiring rest breaks for recovery. Pt will benefit from skilled PT management to address their impairments and improve their level of function. Evaluation Complexity History HIGH Complexity :3+ comorbidities / personal factors will impact the outcome/ POC ; Examination HIGH Complexity : 4+ Standardized tests and measures addressing body structure, function, activity limitation and / or participation in recreation  ;Presentation LOW Complexity : Stable, uncomplicated  ;Clinical Decision Making MEDIUM Complexity : FOTO score of 26-74 Overall Complexity Rating: LOW Problem List: decrease strength, impaired gait/ balance, decrease ADL/ functional abilitiies, decrease activity tolerance and decrease transfer abilities Treatment Plan may include any combination of the following: Therapeutic exercise, Therapeutic activities, Neuromuscular re-education, Physical agent/modality, Gait/balance training, Manual therapy, Patient education and Self Care training Patient / Family readiness to learn indicated by: asking questions, trying to perform skills and interest 
Persons(s) to be included in education: patient (P) and family support person (FSP);list daughter Barriers to Learning/Limitations: yes;  other mild confusion, hard of hearing Patient Goal (s): decrease fall risk.  Patient Self Reported Health Status: poor Rehabilitation Potential: fair Short Term Goals: To be accomplished in 2 weeks: 1. Patient will report performance of HEP at least 2 times per day to facilitate improved outcomes and improved self management. Long Term Goals: To be accomplished in 8 weeks: 1. Patient will report FOTO score of 37 or better to indicate significant improvement in functional status. 2. Pt will demonstrate DGI of 17/24 or better to reduce fall risk. 3. Pt will demonstrate 30 second sit to stand score of 8 or better to improve ease of transfers. 4. Pt will demonstrate ability to ambulate 200 feet with change of direction and FWW use or better void of instability or assistance to improve ease of household mobility. Frequency / Duration: Patient to be seen 2 times per week for 8 weeks. Patient/ Caregiver education and instruction: Diagnosis, prognosis, self care, activity modification and exercises 
 [x]  Plan of care has been reviewed with PTA Certification Period: 7/2/2019 - 8/31/2019 Daniel Mojica, PT, DPT, ATC 7/2/2019 6:48 PM 
 
________________________________________________________________________ I certify that the above Therapy Services are being furnished while the patient is under my care. I agree with the treatment plan and certify that this therapy is necessary. [de-identified] Signature:____________________  Date:____________Time: _________ Please sign and return to In [address] [place], [address] 
[phone] fax

## 2019-07-12 NOTE — TELEPHONE ENCOUNTER
They are requesting a 90 day Rx go to [facility] because patient is doing well on medication    Last Visit: 6/25/19 with MD Marilee Parmar  Next Appointment: 8/12/19 with MD Marilee Parmar    Requested Prescriptions     Pending Prescriptions Disp Refills    venlafaxine-SR (EFFEXOR-XR) 37.5 mg capsule 90 Cap 3     Sig: Take 1 Cap by mouth daily.

## 2019-07-22 NOTE — TELEPHONE ENCOUNTER
Chief Complaint   Patient presents with    Appointment     per Dr Claudia Denise the patient needs to come in to be seen to discuss medication problems     7-22-19 I left a voice message with the following available dates to be able to see Dr Claudia Denise: 7-25-19 at 7:30 am or 8-02-19 at 1:45 PM.  The patient is to check in early.

## 2019-07-22 NOTE — TELEPHONE ENCOUNTER
Pt daughter Alexandria Romero) called said last time pt was in Dr changed her antidepressant  to VENLAFAXINE, since she started pt has had upset stomach, no appetite, and crying  . please call Jessica Adams at [phone]

## 2019-07-23 NOTE — TELEPHONE ENCOUNTER
Patients daughter contacted, she was advised to have the patient stop the medication, and offered an appointment for 8-2-19. Patients daughter stated that she is a Nurse Practioner and that her mother seems to be doing better today. She thinks that the her upset stomach was coincidental. She has an appointment scheduled for 8-12-19, and will bring her mom to urgent care if her symptoms return.

## 2019-08-11 NOTE — PROGRESS NOTES
INTERNISTS OF [facility]: 
8/12/2019, MRN: [id number] Kendra Ledesma is a 80 y.o. female and presents to clinic for Dementia (follow up ROOM  2) and Labs (done 6-25-19 ) Subjective:  
Pt is an 79yo female with h/o iron deficiency, DJD, mitral valve disease, goiter, depression, dementia, AF, right MCA aneurysm, ARMANDO (not using CPAP rx), hyperthyroidism, HTN, rheumatoid arthritis, gastritis, MGUS, and osteopenia/osteoporosis (starting prolia injections, 2019). 1.  Dementia: Thought to be from vascular dementia. She is taking simvastatin. No adverse side effects of taking this medicine. Status post subdural hematoma and intracerebral hemorrhage in September 2018 status post a fall. She is unable to take any type of aspirin or blood thinner despite a history of atrial fibrillation given recurrent falls since last year. Lipid imaging studies in the past showed a right MCA cerebral aneurysm 5 mm in size. Per Phani Nesbitt (Neurology), no intervention is recommended given her low risk of aneurysm rupture. Her dementia per pt hx is unchanged. 2. Depression: Worsening. At her last appointment, her PHQ 9 score was 15. She was transitioned from sertraline to Effexor. Symptoms are worsened on Effexor. As result, she restarted sertraline. Since then, she has generalized fatigue. 3. Diarrhea: At her last appointment, she reported symptoms of diarrhea. Her labs were unremarkable for pancreatitis and infection. Since her last appointment, she continues to have a BM with po intake. Sx are about the same. She went to the ED last month. A CT scan was obtained. Findings are listed below. 7/24/19 Abdomen CT: Mural thickening ascending colon which appears slightly irregular. Although the findings could be due to segmental colitis, malignancy not excluded. Consider direct visualization. Mild sigmoid diverticulosis without evidence of diverticulitis.  Duodenal diverticulum. Normal appendix. Bilateral lower lobe interstitial fibrosis. Moderate cardiomegaly. Bullous changes in lung bases. Small fat-containing left foramen of Bochdalek hernia. Small bilateral renal cysts. Chronic posttraumatic deformity left pubic rami with ununited fractures. Poorly defined sclerosis in sacrum likely due to chronic insufficiency fractures. 4.  Chronic Anemia: Present for over 6 months. No hematochezia, melena, hematuria, or vaginal bleeding. No SOB. Followed by Hematology given h/o MGUS. No fever/chills. She is followed by Author Fraser; she has a f/u apt with Author Fraser later this year. 5. OAB?: At her last appointment, she reported urge incontinence symptoms. Since then, her sx resolved. 6.  Health Maintenance: 
Overdue for pneumococcal vaccine. 7. Fall: Occurred this morning around 4pm. She went to step out of her bed and her leg gave out on her. Her left hip pain contributed to her fall per her hx. No LOC. She hit her head along the left sided. She has \"a little\" bit of pain along her head. \"It's my neck that really hurts. \" No other aches/pains s/p fall. 8. RA: Followed by Rheumatologist. Taking sulfasalazine and plaquenil. No adverse side effects from this rx. +Having some paresthesias and SOB. No CP. Patient Active Problem List  
 Diagnosis Date Noted  Cerebral aneurysm 12/24/2018  Tricuspid regurgitation 12/24/2018  Pulmonary hypertension ([address] 12/24/2018  Iron deficiency anemia 04/01/2017  MGUS (monoclonal gammopathy of unknown significance) 04/01/2017  Osteoarthritis of right knee 01/29/2015  Mitral valve disorder 01/12/2015  Goiter, toxic, multinodular 03/30/2014  Depression 05/10/2012  Atrial fibrillation ([address] 02/08/2012  Long term current use of anticoagulant therapy 12/19/2011  Hyperthyroidism 12/14/2011  RA (rheumatoid arthritis) ([address] 07/21/2011  
 HTN (hypertension) 08/27/2010  Hypercholesterolemia 08/27/2010  Gastritis 08/27/2010  Osteopenia 08/27/2010 Current Outpatient Medications Medication Sig Dispense Refill  teriparatide (FORTEO) 20 mcg/dose - 600 mcg/2.4 mL pnij injection 20 mcg by SubCUTAneous route daily.  simvastatin (ZOCOR) 10 mg tablet TAKE 1 TABLET NIGHTLY 90 Tab 3  
 omeprazole (PRILOSEC) 40 mg capsule TAKE 1 CAPSULE DAILY 90 Cap 3  
 sulfaSALAzine (AZULFIDINE) 500 mg tablet Take 500 mg by mouth two (2) times a day.  prednisoLONE 5 mg tab Take  by mouth every other day. Indications: takes QOD  levothyroxine (SYNTHROID) 125 mcg tablet Take 150 mcg by mouth Daily (before breakfast).  cyclobenzaprine HCl (FLEXERIL PO) Take 10 mg by mouth. Indications: as needed  lidocaine (LIDODERM) 5 % by TransDERmal route every twenty-four (24) hours. Apply patch to the affected area for 12 hours a day and remove for 12 hours a day.  dilTIAZem CD (CARDIZEM CD) 180 mg ER capsule TAKE 1 CAPSULE DAILY 90 Cap 3  
 VITAMIN D2 50,000 unit capsule Take 50,000 Units by mouth two (2) times a week.  GENTAK 0.3 % (3 mg/gram) ophthalmic ointment Administer 0.5 Inches to right eye nightly. 3  
 celecoxib (CELEBREX) 200 mg capsule Take 1 Cap by mouth daily as needed. 90 Cap 3  
 REFRESH TEARS 0.5 % drop ophthalmic solution Apply 1 Drop to eye two (2) times a day.  prednisoLONE acetate (PRED FORTE) 1 % ophthalmic suspension Administer 1 Drop to right eye two (2) times a day.  hydroxychloroquine (PLAQUENIL) 200 mg tablet Take 200 mg by mouth two (2) times a day.  peg 400-propylene glycol (SYSTANE) 0.4-0.3 % Drop Administer 1 Drop to right eye as needed.  methimazole (TAPAZOLE) 5 mg tablet Take 2.5 mg by mouth nightly.  venlafaxine-SR (EFFEXOR-XR) 37.5 mg capsule Take 1 Cap by mouth daily. 90 Cap 3  cycloSPORINE (RESTASIS) 0.05 % ophthalmic emulsion Administer 1 Drop to both eyes two (2) times a day. Allergies Allergen Reactions  Etanercept Other (comments) Caused bad infection behind knee 5/2015 Caused bad infection behind knee 5/2015 Caused bad infection behind knee 5/2015 Caused bad infection behind knee 5/2015 Caused bad infection behind knee 5/2015 Caused bad infection behind knee 5/2015  Haloperidol Other (comments) Hallucinations, agitation, increased BP Hallucinations, agitation, increased BP  
 Haloperidol Lactate Other (comments) Hallucinations, agitation, increased BP Hallucinations, agitation, increased BP  Morphine Nausea and Vomiting and Other (comments) N&V 
N&V 
vomitting N&V  
 Sulfasalazine Diarrhea Past Medical History:  
Diagnosis Date  Acute gastric ulcer without mention of hemorrhage, perforation, or obstruction 1989  
 surg repair  Anemia  Arrhythmia  Atrial fibrillation ([facility] 12/14/2011  Cataract 8-2001 RIGHT  Depression 4-  Edema leg RIGHT   
 NORMAL VENOUS DOPPLER  
 Goiter Nodular 7-30-01  
 History of echocardiogram 04/20/2015 EF 50-55%. No RWMA. Mild LVH. Indeterminate diastolic fx. RVSP 40 mmHg. Severe LAE. Mild JENNY. Mild MVP of anterior leaflet. Mod MR w/2 jets. Mod TR. No significant chg from study of 6/26/14.  History of Holter monitoring 01/09/2012 Well controlled atrial fibrillation. Avg HR 83 bpm.    
 Hypercholesterolemia  Hypertension  Hyperthyroidism 12/14/2011  Insomnia 6-18-09  Leg swelling  Lower extremity venous duplex 12/18/2013 Right leg:  No venous thrombosis.  Multinodular goiter  Osteopenia 8/27/2010  Osteoporosis 2-2009  
 s/p reclast rx  Otitis media  Retina, separation 2004  
 detached retina  Rheumatoid arthritis(714.0)   
 rheumatoid  Septic arthritis [facility]) May 2015 R knee following with Ortho and ID  Skin cancer (melanoma) ([facility] 03/2013  Symptomatic menopausal or female climacteric states  Thyroid disease  Tuberculin test reaction Past Surgical History:  
Procedure Laterality Date  HX BLEPHAROPLASTY  HX HEENT    
 hx detached retina  HX HIP REPLACEMENT Left  HX KNEE ARTHROSCOPY    
 scheduled for replacement but could not do due to infection  HX MOHS PROCEDURES  [id number] LEFT  
 HX ORTHOPAEDIC    
 right leg sx  HX SKIN BIOPSY  2013  
 malignant melanoma [facility]  VT COLONOSCOPY FLX DX W/COLLJ SPEC WHEN PFRMD    
 polyp(tubular adenoma); Dr Eros Haro  VT COLONOSCOPY W/BIOPSY SINGLE/MULTIPLE  2013  
 (+)polyp (bx: tubular adenoma) Dr. Eros Haro Family History Problem Relation Age of Onset  Hypertension Mother  Heart Disease Mother  Osteoporosis Mother  Alcohol abuse Father Social History Tobacco Use  Smoking status: Current Every Day Smoker Years: 45.00 Types: Cigarettes Last attempt to quit: 2018 Years since quittin.9  Smokeless tobacco: Never Used Substance Use Topics  Alcohol use: Yes Alcohol/week: 0.0 standard drinks Comment: occasionally ROS Review of Systems Constitutional: Negative for chills and fever. HENT: Negative for ear pain and sore throat. Eyes: Negative for blurred vision and pain. Respiratory: Positive for shortness of breath. Cardiovascular: Negative for chest pain. Gastrointestinal: Negative for abdominal pain, blood in stool and melena. Genitourinary: Negative for dysuria and hematuria. Musculoskeletal: Positive for falls, joint pain (at baseline given underlying RA) and neck pain. Negative for myalgias. Skin: Negative for rash. Neurological: Negative for focal weakness and headaches. Endo/Heme/Allergies: Does not bruise/bleed easily. Psychiatric/Behavioral: Positive for depression. Negative for substance abuse. Objective Vitals:  
 19 1214 BP: 114/62 Pulse: 90 Resp: 12 Temp: 96.7 °F (35.9 °C) TempSrc: Oral  
SpO2: 98% Weight: 134 lb 3.2 oz (60.9 kg) Height: 5' 1\" (1.549 m) PainSc:   5 PainLoc: Neck Physical Exam  
Constitutional: She is oriented to person, place, and time and well-developed, well-nourished, and in no distress. HENT:  
Head: Normocephalic and atraumatic. Right Ear: External ear normal.  
Left Ear: External ear normal.  
Nose: Nose normal.  
Mouth/Throat: Oropharynx is clear and moist. No oropharyngeal exudate. Eyes: Right eye exhibits no discharge. Left eye exhibits no discharge. No scleral icterus. Neck: Neck supple. Cardiovascular: Normal rate, regular rhythm, normal heart sounds and intact distal pulses. Exam reveals no gallop and no friction rub. No murmur heard. Pulmonary/Chest: Effort normal and breath sounds normal. No respiratory distress. She has no wheezes. She has no rales. Abdominal: Soft. Bowel sounds are normal. She exhibits no distension. There is no tenderness. There is no rebound and no guarding. Musculoskeletal: She exhibits no edema or tenderness (Bue except along MCP jts). Lymphadenopathy:  
  She has no cervical adenopathy. Neurological: She is alert and oriented to person, place, and time. She exhibits normal muscle tone. Gait normal.  
Skin: Skin is warm and dry. No erythema. Psychiatric: Affect normal.  
Nursing note and vitals reviewed. LABS Data Review:  
Lab Results Component Value Date/Time WBC 8.1 06/25/2019 02:55 PM  
 Hemoglobin, POC 10.9 (L) 05/20/2013 11:02 AM  
 HGB 9.6 (L) 06/25/2019 02:55 PM  
 Hematocrit, POC 32 (L) 05/20/2013 11:02 AM  
 HCT 31.7 (L) 06/25/2019 02:55 PM  
 PLATELET 002 62/63/4143 02:55 PM  
 MCV 91.4 06/25/2019 02:55 PM  
 
 
Lab Results Component Value Date/Time  Sodium 139 06/25/2019 02:55 PM  
 Potassium 5.2 06/25/2019 02:55 PM  
 Chloride 107 06/25/2019 02:55 PM  
 CO2 22 06/25/2019 02:55 PM  
 Anion gap 10 06/25/2019 02:55 PM  
 Glucose 95 06/25/2019 02:55 PM  
 BUN 14 06/25/2019 02:55 PM  
 Creatinine 0.86 06/25/2019 02:55 PM  
 BUN/Creatinine ratio 16 06/25/2019 02:55 PM  
 GFR est AA >60 06/25/2019 02:55 PM  
 GFR est non-AA >60 06/25/2019 02:55 PM  
 Calcium 8.8 06/25/2019 02:55 PM  
 
 
Lab Results Component Value Date/Time Cholesterol, total 170 10/16/2017 02:56 PM  
 HDL Cholesterol 98 (H) 10/16/2017 02:56 PM  
 LDL, calculated 54.8 10/16/2017 02:56 PM  
 VLDL, calculated 17.2 10/16/2017 02:56 PM  
 Triglyceride 86 10/16/2017 02:56 PM  
 CHOL/HDL Ratio 1.7 10/16/2017 02:56 PM  
 
 
No results found for: HBA1C, HGBE8, JFN0RXXD, DHH3ZXZZ, CQC3IZEE Assessment/Plan: 1. Health Maintenance: 
Pneumococcal 13 vaccine was administered today. ORDERS: 
- PNEUMOCOCCAL CONJ VACCINE 13 VALENT IM 
- ADMIN PNEUMOCOCCAL VACCINE 
 
2. Anemia: From underlying RA (anemia of chronic disease) Ordering a FIT test 
I encouraged her to f/u with  given her history of MGUS 
 
ORDERS: 
- OCCULT BLOOD IMMUNOASSAY,DIAGNOSTIC; Future 3. Diarrhea: Microscopic colitis? Labs are unremarkable. Her most recent CT findings are concerning for underlying colitis. There is also thickening of parts of her intestinal tract. Ordering a FIT test 
Placing a referral to GI. ORDERS: 
- OCCULT BLOOD IMMUNOASSAY,DIAGNOSTIC; Future 
- REFERRAL TO GASTROENTEROLOGY 4.  Dementia: ([place]) Continue with Rx as prescribed for CVD risk reduction. 5. Depression: She was intolerant of Effexor.  The patient is accompanied today by her daughter. Her daughter plans to schedule an appointment for the patient to be [facility] at the [facility] 6. OAB: Resolved. Observation. 7.  Fall:  The patient declined a referral to PT/OT. Fall risk reduction measures were discussed with her today. 8.  Rheumatoid arthritis: ?Lung involvement? No CP. I encouraged her to find her rheumatologist of her symptoms of shortness of breath. Continue with Rx as prescribed. Health Maintenance Due Topic Date Due  Shingrix Vaccine Age 50> (1 of 2) 06[birth year]09/1983 Lab review: labs are reviewed in the EHR I have discussed the diagnosis with the patient and the intended plan as seen in the above orders. The patient has received an after-visit summary and questions were answered concerning future plans. I have discussed medication side effects and warnings with the patient as well. I have reviewed the plan of care with the patient, accepted their input and they are in agreement with the treatment goals. All questions were answered. The patient understands the plan of care. Handouts provided today with above information. Pt instructed if symptoms worsen to call the office or report to the ED for continued care. Greater than 50% of the visit time was spent in counseling and/or coordination of care. Voice recognition was used to generate this report, which may have resulted in some phonetic based errors in grammar and contents. Even though attempts were made to correct all the mistakes, some may have been missed, and remained in the body of the document.  
 
 
 
 
Say Darnell MD

## 2019-08-12 NOTE — PROGRESS NOTES
Chief Complaint Patient presents with  Dementia  
  follow up ROOM  2  
 Labs  
  done 6-25-19 1. Have you been to the ER, urgent care clinic since your last visit? Hospitalized since your last visit? Yes When: 7-24-19 [facility] Reason: Right Upper Quadrant Abdominal Pain, Vomiting 2. Have you seen or consulted any other health care providers outside of the [address] [place] [address] since your last visit? Include any pap smears or colon screening. No 
 
Patient was given a copy of the Advanced Directive and understands to bring it in once completed. Health Maintenance Due Topic Date Due  Shingrix Vaccine Age 50> (1 of 2) 06/09/1983 Belkis Elias Weathers 6/9/1933 female 
 who presents for routine immunizations. Patient denies any symptoms , reactions or allergies that would exclude them from being immunized today. Risks and adverse reactions were discussed and the VIS was given to them. All questions were addressed. Pneumococcal Vaccine 13  administered per Dr Go Valiente. Jose August with read back. Patient was observed for 15 min post injection. There were no reactions observed. Elizabeth Rios

## 2019-08-12 NOTE — PATIENT INSTRUCTIONS
Health Maintenance Due Topic Date Due  Shingrix Vaccine Age 50> (1 of 2) 06/09/1983 Learning About Colitis What is colitis? Colitis is swelling (inflammation) of the colon. The colon makes up most of the large intestine. Many conditions can cause colitis. What are some types of colitis? · Infectious colitis is a type that appears suddenly. It's caused by a bacteria or virus, such as salmonella, shigella, or campylobacter. · Ischemic colitis is caused by problems with blood flow to the colon. This can happen after surgery. It can also be caused by other health problems. · Microscopic colitis doesn't always have a clear cause. It can only be found with special tests. · Crohn's disease and ulcerative colitis are lifelong diseases. They can cause swelling, inflammation, and deep sores in the lining of the digestive tract. What are the symptoms? Symptoms may include fever, diarrhea that may be bloody, or belly pain. You may also have an urgent need to move your bowels or pain when you move your bowels. Or you may have bleeding from the rectum or weight loss. Your symptoms may depend on the type of colitis you have. For example, microscopic colitis may cause watery diarrhea. Sometimes symptoms go away on their own. If they don't go away, or if you have bleeding or severe pain, call your doctor right away. How is it diagnosed? You may need blood tests or a stool test. You also may need imaging tests like a CT scan. You may have a colonoscopy so that a doctor can look inside your colon. In some cases, the doctor may want to test a sample of tissue from the intestine. This test is called a biopsy. How is it treated? Treatment for colitis depends on the condition that is causing it. · Antibiotics may be used to treat an infection. · Diet changes may help with symptoms. · Medicines can also help to relieve inflammation and control symptoms. · In some cases, surgery to remove parts of the intestine may be needed. Follow-up care is a key part of your treatment and safety. Be sure to make and go to all appointments, and call your doctor if you are having problems. It's also a good idea to know your test results and keep a list of the medicines you take. Where can you learn more? Go to http://elmo-milly.info/. Enter C130 in the search box to learn more about \"Learning About Colitis. \" Current as of: November 7, 2018 Content Version: 12.1 © 9284-0340 Healthwise, Incorporated. Care instructions adapted under license by [employer] (which disclaims liability or warranty for this information). If you have questions about a medical condition or this instruction, always ask your healthcare professional. [address] any warranty or liability for your use of this information.

## 2019-09-09 NOTE — TELEPHONE ENCOUNTER
Patient's daughter dropped off form to be completed for the [place].     Please call Elvie Solo when ready  [phone]

## 2019-09-09 NOTE — TELEPHONE ENCOUNTER
Last Visit: 8/12/19 with MD Navi Gonzales  Next Appointment: 11/4/19 with MD Navi Gonzales  Previous Refill Encounter(s): 7/26/18 #90 with 3 refills    Requested Prescriptions     Pending Prescriptions Disp Refills    sertraline (ZOLOFT) 100 mg tablet 90 Tab 1     Sig: Take 1 Tab by mouth daily. Notes from last OV:  She was transitioned from sertraline to Effexor. Symptoms are worsened on Effexor.   As result, she restarted sertraline

## 2019-09-17 NOTE — H&P
History and Physical reviewed; I have examined the patient and there are no pertinent changes. Ashlie Shrestha MD, MD   2:56 PM 9/17/2019  [facility], [address] [place] [address]  [email or web address]

## 2019-09-17 NOTE — PROCEDURES
[facility]  [address] [facility], [address]      Brief Procedure Note    Katerina Cobos  6/9/1933  [id number]    Date of Procedure: 9/17/2019    Preoperative diagnosis: 787.02 - R11.0,  Nausea  787.91 - R19.7,  Diarrhea  783.21 - R63.4,  Loss of weight  787.60 - R15.9,  Incontinence of feces    Postoperative diagnosis:  Diverticulosis, normal appearing mucosa.   Formed stool above rectum    Type of Anesthesia: none    Description of Findings: same as post op dx    Procedure: Procedure(s):  SIGMOIDOSCOPY FLEXIBLE w/bxs    :  Dr. Annie Ragland MD    Assistant(s): [unfilled]    Type of Anesthesia:none    EBL:None    Specimens:   ID Type Source Tests Collected by Time Destination   1 : rectal bxs  Preservative Rectal  Elian Shah MD 9/17/2019 1529 Pathology       Findings: See printed and scanned procedure note    Complications: None    Dr. Annie Ragland MD  9/17/2019  3:32 PM

## 2019-09-17 NOTE — DISCHARGE INSTRUCTIONS
DISCHARGE SUMMARY from Nurse     POST-PROCEDURE NOTE:    Denise Sterling diagnostic procedure was tolerated well. A copy of the study results will be sent to your Ordering Physician. Medications:    *Please give a list of your current medications to your Primary Care Provider. *Please update this list whenever your medications are discontinued, doses are  changed, or new medications (including over-the-counter products) are added. *Please carry medication information at all times in case of emergency situations. These are general instructions for a healthy lifestyle:    No smoking/ No tobacco products/ Avoid exposure to second hand smoke.  Surgeon General's Warning:  Quitting smoking now greatly reduces serious risk to your health. Obesity, smoking, and a sedentary lifestyle greatly increase your risk for illness.  A healthy diet, regular physical exercise & weight monitoring are important for maintaining a healthy lifestyle   You may be retaining fluid if you have a history of heart failure or if you experience any of the following symptoms:  Weight gain of 3 pounds or more overnight or 5 pounds in a week, increased swelling in our hands or feet or shortness of breath while lying flat in bed. Please call your doctor as soon as you notice any of these symptoms; do not wait until your next office visit. Recognize signs and symptoms of STROKE:  F  -  Face looks uneven  A  -  Arms unable to move or move unevenly  S  -  Speech slurred or non-existent  T  -  Time to call 911 - as soon as signs and symptoms begin - DO NOT go back         to bed or wait to see If you get better - TIME IS BRAIN. Colorectal Screening   Colorectal cancer almost always develops from precancerous polyps (abnormal growths) in the colon or rectum. Screening tests can find precancerous polyps, so that they can be removed before they turn into cancer.  Screening tests can also find colorectal cancer early, when treatment works best.  Abhishek Dash Speak with your physician about when you should begin screening and how often you should be tested. Hashable Activation    Thank you for requesting access to Hashable. Please follow the instructions below to securely access and download your online medical record. Hashable allows you to send messages to your doctor, view your test results, renew your prescriptions, schedule appointments, and more. How Do I Sign Up? 1. In your internet browser, go to [email or web address]. 2. Click on the First Time User? Click Here link in the Sign In box. You will see the New Member Sign Up page. 3. Enter your Hashable Access Code exactly as it appears below. You will not need to use this code after youve completed the sign-up process. If you do not sign up before the expiration date, you must request a new code. Hashable Access Code: [email or web address]  Expires: 2019 12:02 PM (This is the date your Hashable access code will )    4. Enter the last four digits of your Social Security Number (xxxx) and Date of Birth (mm/dd/yyyy) as indicated and click Submit. You will be taken to the next sign-up page. 5. Create a Hashable ID. This will be your Hashable login ID and cannot be changed, so think of one that is secure and easy to remember. 6. Create a Hashable password. You can change your password at any time. 7. Enter your Password Reset Question and Answer. This can be used at a later time if you forget your password. 8. Enter your e-mail address. You will receive e-mail notification when new information is available in [address] 9. Click Sign Up. You can now view and download portions of your medical record. 10. Click the Download Summary menu link to download a portable copy of your medical information. Additional Information    If you have questions, please call [phone]. Remember, Hashable is NOT to be used for urgent needs.  For medical emergencies, dial 911.        Discharge information has been reviewed with the patient. The patient verbalized understanding. Patient Education        Sigmoidoscopy: What to Expect at [facility]    A sigmoidoscopy lets your doctor look inside the lower part of your large intestine. This is also called the colon. The doctor uses a lighted tube called a sigmoidoscope (or scope). This test let the doctor look for small growths (called polyps), cancer, bleeding, hemorrhoids, or other problems. The doctor also may have used the scope to remove polyps. Or he or she may have used it to take tissue samples that need to be tested. You shouldn't have any pain after the procedure. But it is normal to pass gas. You may have mild discomfort from having gas. If your doctor removed polyps, you will likely need to schedule a colonoscopy to look at the whole colon. This care sheet gives you a general idea about how long it will take for you to recover. But each person recovers at a different pace. Follow the steps below to get better as quickly as possible. How can you care for yourself at home? Activity    · Most people are able to return to work right away unless they have had a sedative during the procedure.     · You may need someone to drive you home if you have had a sedative. In most cases, you can drive yourself home. Diet    · You can eat your normal diet. If your stomach is upset, try bland, low-fat foods like plain rice, broiled chicken, toast, and yogurt.     · Be sure to drink plenty of liquids to replace those you have lost during the preparation for the procedure. Exercise    · You can return to normal exercise right away.    Medicine    · Your doctor will tell you if and when you can restart your medicines. He or she will also give you instructions about taking any new medicines.     · If you take blood thinners, such as warfarin (Coumadin), clopidogrel (Plavix), or aspirin, be sure to talk to your doctor.  He or she will tell you if and when to start taking those medicines again. Make sure that you understand exactly what your doctor wants you to do. Follow-up care is a key part of your treatment and safety. Be sure to make and go to all appointments, and call your doctor if you are having problems. It's also a good idea to know your test results and keep a list of the medicines you take. When should you call for help? Call your doctor now or seek immediate medical care if:    · You have new or worse belly pain.     · You have blood in your stools.    Watch closely for changes in your health, and be sure to contact your doctor if you have any problems. Where can you learn more? Go to http://elmo-milly.info/. Enter C414 in the search box to learn more about \"Sigmoidoscopy: What to Expect at Home. \"  Current as of: November 7, 2018  Content Version: 12.1  © 2905-8761 Healthwise, Incorporated. Care instructions adapted under license by [employer] (which disclaims liability or warranty for this information). If you have questions about a medical condition or this instruction, always ask your healthcare professional. [place] [address] any warranty or liability for your use of this information.

## 2019-10-11 NOTE — PROGRESS NOTES
In [address]  [place] [address] [place], [address] 
[phone] fax Physical Therapy Discharge Summary Patient name: Berkley Lombard Start of Care: 2019 Referral source: Samuel Yadav MD : 1933 Medical/Treatment Diagnosis: Left hip pain [M25.552] Rheumatoid arthritis, unspecified [M06.9] Repeated falls [R29.6] Payor: [facility] MEDICARE / Plan: [employer] / Product Type: Medicare /  Onset Date:several years, worsening since 2018 Prior Hospitalization: see medical history Provider#: [id number] Medications: Verified on Patient Summary List   
 Comorbidities: B TKA, left LEWIS, right shoulder fracture, RA, HTN, skull fracture, possible dementia, impaired vision, impaired hearing, osteoporosis 
  
 Prior Level of Function: FWW use for ambulation Visits from Start of Care: 1    Missed Visits: 2 Reporting Period : 2019 to 2019 Summary of Care: 
Short Term Goals: To be accomplished in 2 weeks: 1. Patient will report performance of HEP at least 2 times per day to facilitate improved outcomes and improved self management. 
  
Long Term Goals: To be accomplished in 8 weeks: 1. Patient will report FOTO score of 37 or better to indicate significant improvement in functional status. 2. Pt will demonstrate DGI of 17/24 or better to reduce fall risk. 3. Pt will demonstrate 30 second sit to stand score of 8 or better to improve ease of transfers. 4. Pt will demonstrate ability to ambulate 200 feet with change of direction and FWW use or better void of instability or assistance to improve ease of household mobility. ASSESSMENT/RECOMMENDATIONS: Unable to further assess progress towards goals at this time due to non-compliance/lack of attendance. DC at this time with no further instructions to the patient.   Thank you for this referral. 
 
 [x]Discontinue therapy: []Patient has reached or is progressing toward set goals [x]Patient is non-compliant or has abdicated 
    []Due to lack of appreciable progress towards set goals Radames Levine PT, DPT, ATC 10/11/2019 6:25 PM

## 2019-10-18 NOTE — PATIENT INSTRUCTIONS
Start lasix 20mg once daily  Echo for chf  Blood work (bmp) 2 weeks  If you have not heard from the central scheduler to schedule your testing in 48 hours, please call [phone].

## 2019-10-18 NOTE — PROGRESS NOTES
HISTORY OF PRESENT ILLNESS  Claritza Sharma is a 80 y.o. female. Patient presents for an add-on office visit. Patient has a past medical history significant for hypertension, dyslipidemia, long-standing rheumatoid arthritis, and recently diagnosed hyperthyroidism. The patient was started on anticoagulation initially with warfarin and then switched to Pradaxa. She has been managed with rate controlling agents. She was initially started on metoprolol for rate control, however this was then switched to verapamil, because she could not tolerate the beta blocker due to to extreme fatigue. However, she is now managed with diltiazem for rate control. A follow-up echocardiogram in September 2016 showed low normal left ventricular ejection fraction 50-55% with severe left atrial enlargement and mild pulmonary hypertension, PASP 40 mmHg. The patient's anticoagulation was stopped in July 2018 when she developed acute blood loss anemia presumed GI in nature due to AVMs. She did require 2 units of packed red blood cells. In September 2018 she suffered a fall down the stairs in her garage in [place] when she was visiting her son, suffering a sacral fracture and a skull fracture and a small intracranial hemorrhage in the cerebellum. She has returned to the area to live back home. She does not remember much of the episode, but feels she tripped and fell down the stairs. Patient still reports fairly frequent falls, usually mild, but at least several each month. She has sustained mild lacerations, but no major injuries. The patient was last seen in our office approximately 5 to 6 months ago. Over the past month or 2, she has been becoming progressively more weak with increased leg swelling in both legs right greater than left. She is pretty much confined to the wheelchair and her bed. According to her daughter she sleeps about 16 to 18 hours a day. She has decreased appetite.   She has lost almost 20 pounds in weight, but she is gained about half of that back which appears to be fluid weight. She denies any heart palpitations, dizziness or syncope. She does get short of breath with any exertional activity which involves just standing up and walking across the room. He denies any orthopnea or PND. No chest pain or pressure. Past Medical History:   Diagnosis Date    Acute gastric ulcer without mention of hemorrhage, perforation, or obstruction 1989    surg repair    Anemia     Arrhythmia     Atrial fibrillation ([facility] [address] 12/14/2011    Cataract 8-2001    RIGHT    Depression 4-    Edema leg RIGHT     NORMAL VENOUS DOPPLER    Goiter Nodular 7-30-01    History of echocardiogram 04/20/2015    EF 50-55%. No RWMA. Mild LVH. Indeterminate diastolic fx. RVSP 40 mmHg. Severe LAE. Mild JENNY. Mild MVP of anterior leaflet. Mod MR w/2 jets. Mod TR. No significant chg from study of 6/26/14.  History of Holter monitoring 01/09/2012    Well controlled atrial fibrillation. Avg HR 83 bpm.      Hypercholesterolemia     Hypertension     Hyperthyroidism 12/14/2011    Insomnia 6-18-09    Leg swelling     Lower extremity venous duplex 12/18/2013    Right leg:  No venous thrombosis.  Multinodular goiter     Osteopenia 8/27/2010    Osteoporosis 2-2009    s/p reclast rx    Otitis media     Retina, separation 2004    detached retina    Rheumatoid arthritis(714.0)     rheumatoid    Septic arthritis [facility]) May 2015    R knee following with Ortho and ID    Skin cancer (melanoma) ([facility] [address] 03/2013    Symptomatic menopausal or female climacteric states     Thyroid disease     Tuberculin test reaction       Current Outpatient Medications   Medication Sig Dispense Refill    furosemide (LASIX) 20 mg tablet Take 20 mg by mouth daily.  sertraline (ZOLOFT) 100 mg tablet Take 1 Tab by mouth daily.  90 Tab 1    dilTIAZem CD (CARDIZEM CD) 180 mg ER capsule TAKE 1 CAPSULE DAILY 90 Cap 3    teriparatide (FORTEO) 20 mcg/dose - 600 mcg/2.4 mL pnij injection 20 mcg by SubCUTAneous route daily.  simvastatin (ZOCOR) 10 mg tablet TAKE 1 TABLET NIGHTLY 90 Tab 3    omeprazole (PRILOSEC) 40 mg capsule TAKE 1 CAPSULE DAILY 90 Cap 3    sulfaSALAzine (AZULFIDINE) 500 mg tablet Take 500 mg by mouth two (2) times a day.  prednisoLONE 5 mg tab Take  by mouth every other day. Indications: takes QOD      levothyroxine (SYNTHROID) 125 mcg tablet Take 150 mcg by mouth Daily (before breakfast).  cyclobenzaprine HCl (FLEXERIL PO) Take 10 mg by mouth. Indications: as needed      lidocaine (LIDODERM) 5 % by TransDERmal route every twenty-four (24) hours. Apply patch to the affected area for 12 hours a day and remove for 12 hours a day.  VITAMIN D2 50,000 unit capsule Take 50,000 Units by mouth two (2) times a week.  GENTAK 0.3 % (3 mg/gram) ophthalmic ointment Administer 0.5 Inches to right eye nightly. 3    celecoxib (CELEBREX) 200 mg capsule Take 1 Cap by mouth daily as needed. 90 Cap 3    REFRESH TEARS 0.5 % drop ophthalmic solution Apply 1 Drop to eye two (2) times a day.  prednisoLONE acetate (PRED FORTE) 1 % ophthalmic suspension Administer 1 Drop to right eye two (2) times a day.  hydroxychloroquine (PLAQUENIL) 200 mg tablet Take 200 mg by mouth two (2) times a day.  cycloSPORINE (RESTASIS) 0.05 % ophthalmic emulsion Administer 1 Drop to both eyes two (2) times a day.  peg 400-propylene glycol (SYSTANE) 0.4-0.3 % Drop Administer 1 Drop to right eye as needed.  methimazole (TAPAZOLE) 5 mg tablet Take 2.5 mg by mouth nightly.            Allergies   Allergen Reactions    Etanercept Other (comments)     Caused bad infection behind knee 5/2015  Caused bad infection behind knee 5/2015  Caused bad infection behind knee 5/2015  Caused bad infection behind knee 5/2015  Caused bad infection behind knee 5/2015  Caused bad infection behind knee 5/2015      Haloperidol Other (comments)     Hallucinations, agitation, increased BP  Hallucinations, agitation, increased BP    Haloperidol Lactate Other (comments)     Hallucinations, agitation, increased BP  Hallucinations, agitation, increased BP    Morphine Nausea and Vomiting and Other (comments)     N&V  N&V  vomitting  N&V    Sulfasalazine Diarrhea        Social History     Tobacco Use    Smoking status: Current Every Day Smoker     Years: 45.00     Types: Cigarettes     Last attempt to quit: 2018     Years since quittin.1    Smokeless tobacco: Never Used   Substance Use Topics    Alcohol use: Yes     Alcohol/week: 0.0 standard drinks     Comment: occasionally    Drug use: No         Review of Systems   Constitutional: Negative for chills, fever and weight loss. HENT: Negative for nosebleeds. Eyes: Negative for blurred vision and double vision. Respiratory: Positive for shortness of breath. Negative for cough and wheezing. Cardiovascular: Positive for leg swelling. Negative for chest pain, palpitations, orthopnea, claudication and PND. Gastrointestinal: Negative for abdominal pain, heartburn, nausea and vomiting. Genitourinary: Negative for dysuria and hematuria. Musculoskeletal: Negative for falls, joint pain and myalgias. Skin: Negative for rash. Neurological: Negative for dizziness, focal weakness and headaches. Endo/Heme/Allergies: Does not bruise/bleed easily. Psychiatric/Behavioral: Negative for substance abuse. Visit Vitals  /64 (BP 1 Location: Left arm, BP Patient Position: Sitting)   Pulse (!) 102   Ht 5' 2\" (1.575 m)   Wt 61.2 kg (135 lb)   SpO2 97%   BMI 24.69 kg/m²      Physical Exam   Constitutional: She is oriented to person, place, and time. She appears well-developed and well-nourished. HENT:   Head: Normocephalic and atraumatic. Eyes: Conjunctivae are normal.   Neck: Neck supple. No JVD present. Carotid bruit is present ( Right).    Cardiovascular: S1 normal, S2 normal and normal pulses. An irregularly irregular rhythm present. Exam reveals no gallop. Murmur heard. High-pitched blowing holosystolic murmur is present with a grade of 3/6 at the apex. Pulmonary/Chest: Effort normal. She has no decreased breath sounds. She has no wheezes. She has no rhonchi. She has rales in the left lower field. Abdominal: Soft. Bowel sounds are normal. There is no tenderness. Musculoskeletal: She exhibits edema ( 2+ on the right lower extremity, 1+ on the left lower extremity). Neurological: She is alert and oriented to person, place, and time. Skin: Skin is warm and dry. EKG: Atrial fibrillation, normal axis, heart rate around 100,, poor R-wave progression, nonspecific ST abnormality. Occasional PVCs or aberrantly conducted complexes. Compared to previous EKG, heart rate has increased and PVCs are now present. ASSESSMENT and PLAN    Acute congestive heart failure. Patient has a history of a preserved LV systolic function, but has not been assessed in several years. I have recommended she restart Lasix 20 mg daily and I would like to repeat an echocardiogram.    Persistent atrial fibrillation. Initially diagnosed in December 2011, which now appears to be persistent in nature. Her anticoagulation was stopped in July 2018 due to acute blood loss anemia presumed from small bowel AVMs. She did require 2 units of blood at that time. More recently she suffered a traumatic head injury following a fall where she fractured her skull. She is no longer a candidate for anticoagulation. Her heart rate is mildly elevated today, but this may be due to her heart failure. For now we will continue her current Cardizem dosage unless her echocardiogram is significantly changed. Essential hypertension. Patient's blood pressures continues to be low normal on diltiazem as monotherapy. Losartan was stopped at last visit. Dyslipidemia. Patient is on simvastatin 10 mg daily.  This is followed by her PCP. Right carotid bruit. patient did not have any significant carotid artery disease on a recent duplex scan in November 2017. No further workup needed. Deconditioning/weight loss. Patient is now confined to the wheelchair most of the day. She has not been able to tolerate any sort of physical therapy. Blood chemistries with a Chem-7 panel will need to be done 1 to 2 weeks after starting furosemide. Followup in 3 months, sooner if needed.

## 2019-10-18 NOTE — PROGRESS NOTES
Markus Osuna presents today for   Chief Complaint   Patient presents with    Irregular Heart Beat     6 month follow up     Shortness of Breath     exertion    Leg Swelling     mild    Dizziness     sometimes        Keith Weathers preferred language for health care discussion is english/other. Is someone accompanying this pt? Daughter     Is the patient using any DME equipment during [address]? Wheelchair     Depression Screening:  3 most recent PHQ Screens 9/24/2019   Little interest or pleasure in doing things Nearly every day   Feeling down, depressed, irritable, or hopeless Nearly every day   Total Score PHQ 2 6   Trouble falling or staying asleep, or sleeping too much Nearly every day   Feeling tired or having little energy Nearly every day   Poor appetite, weight loss, or overeating Nearly every day   Feeling bad about yourself - or that you are a failure or have let yourself or your family down Not at all   Trouble concentrating on things such as school, work, reading, or watching TV Not at all   Moving or speaking so slowly that other people could have noticed; or the opposite being so fidgety that others notice Several days   Thoughts of being better off dead, or hurting yourself in some way Not at all   PHQ 9 Score 16   How difficult have these problems made it for you to do your work, take care of your home and get along with others Very difficult       Learning Assessment:  Learning Assessment 6/25/2019   PRIMARY LEARNER Patient   HIGHEST LEVEL OF EDUCATION - PRIMARY LEARNER  GRADUATED HIGH SCHOOL OR GED   BARRIERS PRIMARY LEARNER NONE   CO-LEARNER CAREGIVER No   PRIMARY LANGUAGE ENGLISH   LEARNER PREFERENCE PRIMARY DEMONSTRATION   ANSWERED BY patient   RELATIONSHIP SELF       Abuse Screening:  Abuse Screening Questionnaire 6/25/2019   Do you ever feel afraid of your partner? N   Are you in a relationship with someone who physically or mentally threatens you?  N   Is it safe for you to go home? Y       Fall Risk  Fall Risk Assessment, last 12 mths 6/25/2019   Able to walk? Yes   Fall in past 12 months? Yes   Fall with injury? Yes   Number of falls in past 12 months 6   Fall Risk Score 7       Pt currently taking Anticoagulant therapy? no    Coordination of Care:  1. Have you been to the ER, urgent care clinic since your last visit? Hospitalized since your last visit? no    2. Have you seen or consulted any other health care providers outside of the [address] [place] [address] since your last visit? Include any pap smears or colon screening.  no

## 2019-11-18 ENCOUNTER — TELEPHONE (OUTPATIENT)
Dept: INTERNAL MEDICINE CLINIC | Age: 84
End: 2019-11-18

## 2019-11-18 NOTE — TELEPHONE ENCOUNTER
----- Message from Abe Cason MD sent at 11/18/2019 10:54 AM EST -----  Regarding: F/u apt  Please schedule her to see me at 2 PM this Friday if she can come in at that time for hospital f/u.     Respectfully,  Dr. Allison Inch  [facility], [address] [place] [address], [address] [place] [address] Str.  Phone: [phone]  Fax: [phone]
